# Patient Record
Sex: FEMALE | Race: WHITE | NOT HISPANIC OR LATINO | Employment: OTHER | URBAN - METROPOLITAN AREA
[De-identification: names, ages, dates, MRNs, and addresses within clinical notes are randomized per-mention and may not be internally consistent; named-entity substitution may affect disease eponyms.]

---

## 2017-05-25 ENCOUNTER — APPOINTMENT (OUTPATIENT)
Dept: WOMENS IMAGING | Facility: HOSPITAL | Age: 77
End: 2017-05-25

## 2017-05-25 PROCEDURE — G0202 SCR MAMMO BI INCL CAD: HCPCS | Performed by: RADIOLOGY

## 2017-05-25 PROCEDURE — 77063 BREAST TOMOSYNTHESIS BI: CPT | Performed by: RADIOLOGY

## 2017-05-25 PROCEDURE — MDREVIEWSP: Performed by: RADIOLOGY

## 2018-05-08 ENCOUNTER — OFFICE VISIT CONVERTED (OUTPATIENT)
Dept: ORTHOPEDIC SURGERY | Facility: CLINIC | Age: 78
End: 2018-05-08
Attending: PHYSICIAN ASSISTANT

## 2018-05-17 ENCOUNTER — OFFICE VISIT CONVERTED (OUTPATIENT)
Dept: ORTHOPEDIC SURGERY | Facility: CLINIC | Age: 78
End: 2018-05-17
Attending: ORTHOPAEDIC SURGERY

## 2018-07-10 ENCOUNTER — OFFICE VISIT CONVERTED (OUTPATIENT)
Dept: ORTHOPEDIC SURGERY | Facility: CLINIC | Age: 78
End: 2018-07-10
Attending: ORTHOPAEDIC SURGERY

## 2018-07-18 ENCOUNTER — APPOINTMENT (OUTPATIENT)
Dept: WOMENS IMAGING | Facility: HOSPITAL | Age: 78
End: 2018-07-18

## 2018-07-18 PROCEDURE — 77067 SCR MAMMO BI INCL CAD: CPT | Performed by: RADIOLOGY

## 2018-07-18 PROCEDURE — 77063 BREAST TOMOSYNTHESIS BI: CPT | Performed by: RADIOLOGY

## 2018-08-07 ENCOUNTER — OFFICE VISIT CONVERTED (OUTPATIENT)
Dept: ORTHOPEDIC SURGERY | Facility: CLINIC | Age: 78
End: 2018-08-07
Attending: ORTHOPAEDIC SURGERY

## 2018-11-15 ENCOUNTER — OFFICE VISIT CONVERTED (OUTPATIENT)
Dept: ORTHOPEDIC SURGERY | Facility: CLINIC | Age: 78
End: 2018-11-15
Attending: ORTHOPAEDIC SURGERY

## 2019-05-26 ENCOUNTER — HOSPITAL ENCOUNTER (OUTPATIENT)
Dept: URGENT CARE | Facility: CLINIC | Age: 79
Discharge: HOME | End: 2019-05-26

## 2019-05-30 ENCOUNTER — OFFICE VISIT CONVERTED (OUTPATIENT)
Dept: ORTHOPEDIC SURGERY | Facility: CLINIC | Age: 79
End: 2019-05-30
Attending: ORTHOPAEDIC SURGERY

## 2019-06-11 ENCOUNTER — HOSPITAL ENCOUNTER (OUTPATIENT)
Dept: GENERAL RADIOLOGY | Facility: HOSPITAL | Age: 79
Discharge: HOME | End: 2019-06-11
Attending: INTERNAL MEDICINE

## 2019-08-07 ENCOUNTER — APPOINTMENT (OUTPATIENT)
Dept: WOMENS IMAGING | Facility: HOSPITAL | Age: 79
End: 2019-08-07

## 2019-08-07 PROCEDURE — 77063 BREAST TOMOSYNTHESIS BI: CPT | Performed by: RADIOLOGY

## 2019-08-07 PROCEDURE — 77067 SCR MAMMO BI INCL CAD: CPT | Performed by: RADIOLOGY

## 2019-08-30 ENCOUNTER — OFFICE VISIT CONVERTED (OUTPATIENT)
Dept: ORTHOPEDIC SURGERY | Facility: CLINIC | Age: 79
End: 2019-08-30
Attending: ORTHOPAEDIC SURGERY

## 2019-09-16 ENCOUNTER — CONVERSION ENCOUNTER (OUTPATIENT)
Dept: SURGERY | Facility: CLINIC | Age: 79
End: 2019-09-16

## 2019-09-16 ENCOUNTER — OFFICE VISIT CONVERTED (OUTPATIENT)
Dept: SURGERY | Facility: CLINIC | Age: 79
End: 2019-09-16
Attending: SURGERY

## 2019-10-10 ENCOUNTER — HOSPITAL ENCOUNTER (OUTPATIENT)
Dept: PERIOP | Facility: HOSPITAL | Age: 79
Setting detail: HOSPITAL OUTPATIENT SURGERY
Discharge: HOME | End: 2019-10-10
Attending: SURGERY

## 2019-10-10 LAB
GLUCOSE BLD-MCNC: 112 MG/DL (ref 65–99)
GLUCOSE BLD-MCNC: 99 MG/DL (ref 65–99)

## 2020-05-23 ENCOUNTER — HOSPITAL ENCOUNTER (OUTPATIENT)
Dept: URGENT CARE | Facility: CLINIC | Age: 80
Discharge: HOME | End: 2020-05-23
Attending: PHYSICIAN ASSISTANT

## 2020-06-09 ENCOUNTER — OFFICE VISIT CONVERTED (OUTPATIENT)
Dept: ORTHOPEDIC SURGERY | Facility: CLINIC | Age: 80
End: 2020-06-09
Attending: ORTHOPAEDIC SURGERY

## 2020-06-18 ENCOUNTER — HOSPITAL ENCOUNTER (OUTPATIENT)
Dept: MRI IMAGING | Facility: HOSPITAL | Age: 80
Discharge: HOME | End: 2020-06-18

## 2020-06-25 ENCOUNTER — HOSPITAL ENCOUNTER (OUTPATIENT)
Dept: URGENT CARE | Facility: CLINIC | Age: 80
Discharge: HOME | End: 2020-06-25
Attending: FAMILY MEDICINE

## 2020-09-28 ENCOUNTER — OFFICE VISIT (OUTPATIENT)
Dept: GASTROENTEROLOGY | Facility: CLINIC | Age: 80
End: 2020-09-28

## 2020-09-28 VITALS
DIASTOLIC BLOOD PRESSURE: 82 MMHG | WEIGHT: 228 LBS | TEMPERATURE: 97.7 F | BODY MASS INDEX: 37.99 KG/M2 | OXYGEN SATURATION: 96 % | HEIGHT: 65 IN | HEART RATE: 90 BPM | SYSTOLIC BLOOD PRESSURE: 136 MMHG

## 2020-09-28 DIAGNOSIS — K56.690 OTHER PARTIAL INTESTINAL OBSTRUCTION (HCC): ICD-10-CM

## 2020-09-28 DIAGNOSIS — K59.04 CHRONIC IDIOPATHIC CONSTIPATION: Primary | ICD-10-CM

## 2020-09-28 PROCEDURE — 99204 OFFICE O/P NEW MOD 45 MIN: CPT | Performed by: INTERNAL MEDICINE

## 2020-09-28 RX ORDER — ROPINIROLE 3 MG/1
TABLET, FILM COATED ORAL
COMMUNITY
Start: 2020-08-07 | End: 2021-11-26

## 2020-09-28 RX ORDER — CARVEDILOL 3.12 MG/1
3.12 TABLET ORAL 2 TIMES DAILY WITH MEALS
COMMUNITY
Start: 2020-09-02

## 2020-09-28 RX ORDER — PLECANATIDE 3 MG/1
3 TABLET ORAL DAILY
Qty: 30 TABLET | Refills: 5 | Status: SHIPPED | OUTPATIENT
Start: 2020-09-28 | End: 2020-10-08

## 2020-09-28 RX ORDER — OXYCODONE AND ACETAMINOPHEN 10; 325 MG/1; MG/1
TABLET ORAL
COMMUNITY
Start: 2020-09-08 | End: 2022-09-13

## 2020-09-28 RX ORDER — BUPROPION HYDROCHLORIDE 300 MG/1
300 TABLET ORAL EVERY MORNING
COMMUNITY
Start: 2020-09-02

## 2020-09-28 RX ORDER — AMITRIPTYLINE HYDROCHLORIDE 50 MG/1
50 TABLET, FILM COATED ORAL NIGHTLY
COMMUNITY
Start: 2020-09-02

## 2020-09-28 RX ORDER — BUMETANIDE 2 MG/1
2 TABLET ORAL DAILY PRN
Status: ON HOLD | COMMUNITY
End: 2022-10-14

## 2020-09-28 RX ORDER — ROSUVASTATIN CALCIUM 10 MG/1
10 TABLET, COATED ORAL NIGHTLY
COMMUNITY
End: 2022-09-13

## 2020-09-28 RX ORDER — CHLORAL HYDRATE 500 MG
1000 CAPSULE ORAL
COMMUNITY

## 2020-09-28 RX ORDER — FOLIC ACID 0.8 MG
1 TABLET ORAL DAILY
COMMUNITY

## 2020-09-28 NOTE — PATIENT INSTRUCTIONS
For constipation, begin taking Trulance 3mg once daily. Samples provided & prescription sent to pharmacy.    Schedule CT colonography for further evaluation.

## 2020-10-01 ENCOUNTER — TELEPHONE (OUTPATIENT)
Dept: GASTROENTEROLOGY | Facility: CLINIC | Age: 80
End: 2020-10-01

## 2020-10-01 NOTE — TELEPHONE ENCOUNTER
----- Message from KATHY Braden sent at 9/28/2020  2:21 PM EDT -----  Please obtain records from surgical procedure done 12/2019 with Dr. Mack near [place], [place]. Patient reports this was done at  [facility] in [facility]. Need op report and pathology report.  Requested procedure note and path   [phone]  [phone]

## 2020-10-01 NOTE — TELEPHONE ENCOUNTER
----- Message from KATHY Braden sent at 9/28/2020  2:21 PM EDT -----  Please obtain records from surgical procedure done 12/2019 with Dr. Mack near [place], [place]. Patient reports this was done at  [facility] in [facility]. Need op report and pathology report.    Requested records. pk

## 2020-10-02 ENCOUNTER — TELEPHONE (OUTPATIENT)
Dept: GASTROENTEROLOGY | Facility: CLINIC | Age: 80
End: 2020-10-02

## 2020-10-02 ENCOUNTER — APPOINTMENT (OUTPATIENT)
Dept: WOMENS IMAGING | Facility: HOSPITAL | Age: 80
End: 2020-10-02

## 2020-10-02 PROCEDURE — 77067 SCR MAMMO BI INCL CAD: CPT | Performed by: RADIOLOGY

## 2020-10-02 PROCEDURE — 77080 DXA BONE DENSITY AXIAL: CPT | Performed by: RADIOLOGY

## 2020-10-02 PROCEDURE — 77063 BREAST TOMOSYNTHESIS BI: CPT | Performed by: RADIOLOGY

## 2020-10-02 NOTE — TELEPHONE ENCOUNTER
----- Message from KATHY Braden sent at 9/28/2020  2:21 PM EDT -----  Please obtain records from surgical procedure done 12/2019 with Dr. Mack near [place], [place]. Patient reports this was done at  [facility] in [facility]. Need op report and pathology report.  Orin,  The op note and path reports are in EMR.  The path report went to your in box but not procedure note.  Thanks

## 2020-10-08 ENCOUNTER — TELEPHONE (OUTPATIENT)
Dept: GASTROENTEROLOGY | Facility: CLINIC | Age: 80
End: 2020-10-08

## 2020-10-08 RX ORDER — LUBIPROSTONE 24 UG/1
24 CAPSULE ORAL 2 TIMES DAILY WITH MEALS
Qty: 30 CAPSULE | Refills: 5 | OUTPATIENT
Start: 2020-10-08 | End: 2021-11-26

## 2020-10-08 NOTE — TELEPHONE ENCOUNTER
Patient was seen on 9/28/2020 for constipation , patient was prescribed Trulance 3 mg . Patient stated she had a BM the day she received the med, has not had one since  is requesting a alternative med be send into pharmacy . Please advise    MsFaviola Adkins [phone]    joselo ( in chart )

## 2020-10-26 ENCOUNTER — HOSPITAL ENCOUNTER (OUTPATIENT)
Dept: LAB | Facility: HOSPITAL | Age: 80
Discharge: HOME | End: 2020-10-26
Attending: PHYSICIAN ASSISTANT

## 2020-10-26 LAB
25(OH)D3 SERPL-MCNC: 31.5 NG/ML (ref 30–100)
ALBUMIN SERPL-MCNC: 4.4 G/DL (ref 3.5–5)
ALBUMIN/GLOB SERPL: 1.7 {RATIO} (ref 1.4–2.6)
ALP SERPL-CCNC: 95 U/L (ref 43–160)
ALT SERPL-CCNC: 16 U/L (ref 10–40)
ANION GAP SERPL CALC-SCNC: 21 MMOL/L (ref 8–19)
AST SERPL-CCNC: 17 U/L (ref 15–50)
BASOPHILS # BLD AUTO: 0.07 10*3/UL (ref 0–0.2)
BASOPHILS NFR BLD AUTO: 1.3 % (ref 0–3)
BILIRUB SERPL-MCNC: 0.55 MG/DL (ref 0.2–1.3)
BUN SERPL-MCNC: 34 MG/DL (ref 5–25)
BUN/CREAT SERPL: 24 {RATIO} (ref 6–20)
CALCIUM SERPL-MCNC: 10.3 MG/DL (ref 8.7–10.4)
CHLORIDE SERPL-SCNC: 100 MMOL/L (ref 99–111)
CHOLEST SERPL-MCNC: 165 MG/DL (ref 107–200)
CHOLEST/HDLC SERPL: 3.8 {RATIO} (ref 3–6)
CONV ABS IMM GRAN: 0 10*3/UL (ref 0–0.2)
CONV CO2: 26 MMOL/L (ref 22–32)
CONV IMMATURE GRAN: 0 % (ref 0–1.8)
CONV TOTAL PROTEIN: 7 G/DL (ref 6.3–8.2)
CREAT UR-MCNC: 1.4 MG/DL (ref 0.5–0.9)
DEPRECATED RDW RBC AUTO: 44.4 FL (ref 36.4–46.3)
EOSINOPHIL # BLD AUTO: 0.33 10*3/UL (ref 0–0.7)
EOSINOPHIL # BLD AUTO: 6.4 % (ref 0–7)
ERYTHROCYTE [DISTWIDTH] IN BLOOD BY AUTOMATED COUNT: 12.7 % (ref 11.7–14.4)
GFR SERPLBLD BASED ON 1.73 SQ M-ARVRAT: 36 ML/MIN/{1.73_M2}
GLOBULIN UR ELPH-MCNC: 2.6 G/DL (ref 2–3.5)
GLUCOSE SERPL-MCNC: 102 MG/DL (ref 65–99)
HCT VFR BLD AUTO: 39.9 % (ref 37–47)
HDLC SERPL-MCNC: 44 MG/DL (ref 40–60)
HGB BLD-MCNC: 12.4 G/DL (ref 12–16)
LDLC SERPL CALC-MCNC: 70 MG/DL (ref 70–100)
LYMPHOCYTES # BLD AUTO: 1.58 10*3/UL (ref 1–5)
LYMPHOCYTES NFR BLD AUTO: 30.4 % (ref 20–45)
MCH RBC QN AUTO: 29.7 PG (ref 27–31)
MCHC RBC AUTO-ENTMCNC: 31.1 G/DL (ref 33–37)
MCV RBC AUTO: 95.7 FL (ref 81–99)
MONOCYTES # BLD AUTO: 0.61 10*3/UL (ref 0.2–1.2)
MONOCYTES NFR BLD AUTO: 11.8 % (ref 3–10)
NEUTROPHILS # BLD AUTO: 2.6 10*3/UL (ref 2–8)
NEUTROPHILS NFR BLD AUTO: 50.1 % (ref 30–85)
NRBC CBCN: 0 % (ref 0–0.7)
OSMOLALITY SERPL CALC.SUM OF ELEC: 302 MOSM/KG (ref 273–304)
PLATELET # BLD AUTO: 283 10*3/UL (ref 130–400)
PMV BLD AUTO: 10.9 FL (ref 9.4–12.3)
POTASSIUM SERPL-SCNC: 5 MMOL/L (ref 3.5–5.3)
RBC # BLD AUTO: 4.17 10*6/UL (ref 4.2–5.4)
SODIUM SERPL-SCNC: 142 MMOL/L (ref 135–147)
T4 FREE SERPL-MCNC: 1 NG/DL (ref 0.9–1.8)
TRIGL SERPL-MCNC: 255 MG/DL (ref 40–150)
TSH SERPL-ACNC: 5.02 M[IU]/L (ref 0.27–4.2)
VLDLC SERPL-MCNC: 51 MG/DL (ref 5–37)
WBC # BLD AUTO: 5.19 10*3/UL (ref 4.8–10.8)

## 2020-11-10 DIAGNOSIS — K56.690 OTHER PARTIAL INTESTINAL OBSTRUCTION (HCC): ICD-10-CM

## 2020-11-10 DIAGNOSIS — K59.04 CHRONIC IDIOPATHIC CONSTIPATION: ICD-10-CM

## 2020-12-04 ENCOUNTER — OFFICE VISIT CONVERTED (OUTPATIENT)
Dept: ORTHOPEDIC SURGERY | Facility: CLINIC | Age: 80
End: 2020-12-04
Attending: ORTHOPAEDIC SURGERY

## 2020-12-28 ENCOUNTER — HOSPITAL ENCOUNTER (OUTPATIENT)
Dept: LAB | Facility: HOSPITAL | Age: 80
Discharge: HOME | End: 2020-12-28
Attending: PHYSICIAN ASSISTANT

## 2020-12-28 LAB
25(OH)D3 SERPL-MCNC: 30.4 NG/ML (ref 30–100)
ALBUMIN SERPL-MCNC: 4.6 G/DL (ref 3.5–5)
ALBUMIN/GLOB SERPL: 1.7 {RATIO} (ref 1.4–2.6)
ALP SERPL-CCNC: 104 U/L (ref 43–160)
ALT SERPL-CCNC: 15 U/L (ref 10–40)
ANION GAP SERPL CALC-SCNC: 15 MMOL/L (ref 8–19)
AST SERPL-CCNC: 18 U/L (ref 15–50)
BASOPHILS # BLD AUTO: 0.07 10*3/UL (ref 0–0.2)
BASOPHILS NFR BLD AUTO: 1.1 % (ref 0–3)
BILIRUB SERPL-MCNC: 0.73 MG/DL (ref 0.2–1.3)
BUN SERPL-MCNC: 40 MG/DL (ref 5–25)
BUN/CREAT SERPL: 28 {RATIO} (ref 6–20)
CALCIUM SERPL-MCNC: 9.9 MG/DL (ref 8.7–10.4)
CHLORIDE SERPL-SCNC: 101 MMOL/L (ref 99–111)
CHOLEST SERPL-MCNC: 214 MG/DL (ref 107–200)
CHOLEST/HDLC SERPL: 3.3 {RATIO} (ref 3–6)
CONV ABS IMM GRAN: 0.01 10*3/UL (ref 0–0.2)
CONV CO2: 29 MMOL/L (ref 22–32)
CONV IMMATURE GRAN: 0.2 % (ref 0–1.8)
CONV TOTAL PROTEIN: 7.3 G/DL (ref 6.3–8.2)
CREAT UR-MCNC: 1.45 MG/DL (ref 0.5–0.9)
DEPRECATED RDW RBC AUTO: 43.8 FL (ref 36.4–46.3)
EOSINOPHIL # BLD AUTO: 0.2 10*3/UL (ref 0–0.7)
EOSINOPHIL # BLD AUTO: 3.2 % (ref 0–7)
ERYTHROCYTE [DISTWIDTH] IN BLOOD BY AUTOMATED COUNT: 12.9 % (ref 11.7–14.4)
GFR SERPLBLD BASED ON 1.73 SQ M-ARVRAT: 34 ML/MIN/{1.73_M2}
GLOBULIN UR ELPH-MCNC: 2.7 G/DL (ref 2–3.5)
GLUCOSE SERPL-MCNC: 106 MG/DL (ref 65–99)
HCT VFR BLD AUTO: 43.1 % (ref 37–47)
HDLC SERPL-MCNC: 64 MG/DL (ref 40–60)
HGB BLD-MCNC: 13.7 G/DL (ref 12–16)
LDLC SERPL CALC-MCNC: 120 MG/DL (ref 70–100)
LYMPHOCYTES # BLD AUTO: 1.75 10*3/UL (ref 1–5)
LYMPHOCYTES NFR BLD AUTO: 28.3 % (ref 20–45)
MCH RBC QN AUTO: 29.6 PG (ref 27–31)
MCHC RBC AUTO-ENTMCNC: 31.8 G/DL (ref 33–37)
MCV RBC AUTO: 93.1 FL (ref 81–99)
MONOCYTES # BLD AUTO: 0.6 10*3/UL (ref 0.2–1.2)
MONOCYTES NFR BLD AUTO: 9.7 % (ref 3–10)
NEUTROPHILS # BLD AUTO: 3.55 10*3/UL (ref 2–8)
NEUTROPHILS NFR BLD AUTO: 57.5 % (ref 30–85)
NRBC CBCN: 0 % (ref 0–0.7)
OSMOLALITY SERPL CALC.SUM OF ELEC: 300 MOSM/KG (ref 273–304)
PLATELET # BLD AUTO: 255 10*3/UL (ref 130–400)
PMV BLD AUTO: 10.7 FL (ref 9.4–12.3)
POTASSIUM SERPL-SCNC: 4.5 MMOL/L (ref 3.5–5.3)
RBC # BLD AUTO: 4.63 10*6/UL (ref 4.2–5.4)
SODIUM SERPL-SCNC: 140 MMOL/L (ref 135–147)
T4 FREE SERPL-MCNC: 1 NG/DL (ref 0.9–1.8)
TRIGL SERPL-MCNC: 152 MG/DL (ref 40–150)
TSH SERPL-ACNC: 1.62 M[IU]/L (ref 0.27–4.2)
VLDLC SERPL-MCNC: 30 MG/DL (ref 5–37)
WBC # BLD AUTO: 6.18 10*3/UL (ref 4.8–10.8)

## 2021-01-05 ENCOUNTER — HOSPITAL ENCOUNTER (OUTPATIENT)
Dept: GENERAL RADIOLOGY | Facility: HOSPITAL | Age: 81
Discharge: HOME | End: 2021-01-05
Attending: INTERNAL MEDICINE

## 2021-01-12 ENCOUNTER — OFFICE VISIT CONVERTED (OUTPATIENT)
Dept: ORTHOPEDIC SURGERY | Facility: CLINIC | Age: 81
End: 2021-01-12
Attending: ORTHOPAEDIC SURGERY

## 2021-01-21 ENCOUNTER — OFFICE VISIT CONVERTED (OUTPATIENT)
Dept: ORTHOPEDIC SURGERY | Facility: CLINIC | Age: 81
End: 2021-01-21
Attending: ORTHOPAEDIC SURGERY

## 2021-01-25 ENCOUNTER — HOSPITAL ENCOUNTER (OUTPATIENT)
Dept: LAB | Facility: HOSPITAL | Age: 81
Discharge: HOME | End: 2021-01-25
Attending: INTERNAL MEDICINE

## 2021-01-25 LAB
ALBUMIN SERPL-MCNC: 4.2 G/DL (ref 3.5–5)
ALBUMIN/GLOB SERPL: 1.6 {RATIO} (ref 1.4–2.6)
ALP SERPL-CCNC: 100 U/L (ref 43–160)
ALT SERPL-CCNC: 13 U/L (ref 10–40)
ANION GAP SERPL CALC-SCNC: 15 MMOL/L (ref 8–19)
AST SERPL-CCNC: 16 U/L (ref 15–50)
BASOPHILS # BLD AUTO: 0.07 10*3/UL (ref 0–0.2)
BASOPHILS NFR BLD AUTO: 1.6 % (ref 0–3)
BILIRUB SERPL-MCNC: 0.53 MG/DL (ref 0.2–1.3)
BUN SERPL-MCNC: 23 MG/DL (ref 5–25)
BUN/CREAT SERPL: 19 {RATIO} (ref 6–20)
CALCIUM SERPL-MCNC: 9.5 MG/DL (ref 8.7–10.4)
CHLORIDE SERPL-SCNC: 103 MMOL/L (ref 99–111)
CHOLEST SERPL-MCNC: 165 MG/DL (ref 107–200)
CHOLEST/HDLC SERPL: 2.8 {RATIO} (ref 3–6)
CONV ABS IMM GRAN: 0.02 10*3/UL (ref 0–0.2)
CONV CO2: 28 MMOL/L (ref 22–32)
CONV CREATININE URINE, RANDOM: 65.1 MG/DL (ref 10–300)
CONV IMMATURE GRAN: 0.5 % (ref 0–1.8)
CONV MICROALBUM.,U,RANDOM: <12 MG/L (ref 0–20)
CONV TOTAL PROTEIN: 6.9 G/DL (ref 6.3–8.2)
CREAT UR-MCNC: 1.18 MG/DL (ref 0.5–0.9)
DEPRECATED RDW RBC AUTO: 43.9 FL (ref 36.4–46.3)
EOSINOPHIL # BLD AUTO: 0.22 10*3/UL (ref 0–0.7)
EOSINOPHIL # BLD AUTO: 5.2 % (ref 0–7)
ERYTHROCYTE [DISTWIDTH] IN BLOOD BY AUTOMATED COUNT: 12.9 % (ref 11.7–14.4)
EST. AVERAGE GLUCOSE BLD GHB EST-MCNC: 120 MG/DL
GFR SERPLBLD BASED ON 1.73 SQ M-ARVRAT: 44 ML/MIN/{1.73_M2}
GLOBULIN UR ELPH-MCNC: 2.7 G/DL (ref 2–3.5)
GLUCOSE SERPL-MCNC: 98 MG/DL (ref 65–99)
HBA1C MFR BLD: 5.8 % (ref 3.5–5.7)
HCT VFR BLD AUTO: 40.4 % (ref 37–47)
HDLC SERPL-MCNC: 58 MG/DL (ref 40–60)
HGB BLD-MCNC: 13 G/DL (ref 12–16)
LDLC SERPL CALC-MCNC: 76 MG/DL (ref 70–100)
LYMPHOCYTES # BLD AUTO: 1.46 10*3/UL (ref 1–5)
LYMPHOCYTES NFR BLD AUTO: 34.2 % (ref 20–45)
MCH RBC QN AUTO: 30.1 PG (ref 27–31)
MCHC RBC AUTO-ENTMCNC: 32.2 G/DL (ref 33–37)
MCV RBC AUTO: 93.5 FL (ref 81–99)
MICROALBUMIN/CREAT UR: 18.4 MG/G{CRE} (ref 0–35)
MONOCYTES # BLD AUTO: 0.51 10*3/UL (ref 0.2–1.2)
MONOCYTES NFR BLD AUTO: 11.9 % (ref 3–10)
NEUTROPHILS # BLD AUTO: 1.99 10*3/UL (ref 2–8)
NEUTROPHILS NFR BLD AUTO: 46.6 % (ref 30–85)
NRBC CBCN: 0 % (ref 0–0.7)
OSMOLALITY SERPL CALC.SUM OF ELEC: 296 MOSM/KG (ref 273–304)
PLATELET # BLD AUTO: 269 10*3/UL (ref 130–400)
PMV BLD AUTO: 10.6 FL (ref 9.4–12.3)
POTASSIUM SERPL-SCNC: 5 MMOL/L (ref 3.5–5.3)
RBC # BLD AUTO: 4.32 10*6/UL (ref 4.2–5.4)
SODIUM SERPL-SCNC: 141 MMOL/L (ref 135–147)
TRIGL SERPL-MCNC: 153 MG/DL (ref 40–150)
TSH SERPL-ACNC: 1.35 M[IU]/L (ref 0.27–4.2)
VLDLC SERPL-MCNC: 31 MG/DL (ref 5–37)
WBC # BLD AUTO: 4.27 10*3/UL (ref 4.8–10.8)

## 2021-03-11 ENCOUNTER — OFFICE VISIT CONVERTED (OUTPATIENT)
Dept: ORTHOPEDIC SURGERY | Facility: CLINIC | Age: 81
End: 2021-03-11
Attending: ORTHOPAEDIC SURGERY

## 2021-05-05 ENCOUNTER — HOSPITAL ENCOUNTER (OUTPATIENT)
Dept: GENERAL RADIOLOGY | Facility: HOSPITAL | Age: 81
Discharge: HOME | End: 2021-05-05

## 2021-05-10 NOTE — H&P
History and Physical      Patient Name: Chula Adkins   Patient ID: [id number]   Sex: Female   YOB: 1940    Primary Care Provider: Owen Mathias MD   Referring Provider: Ha Paige MD    Visit Date: June 9, 2020    Provider: Jose Richter MD   Location: [facility]   Location Address: [address] [place] [address]  [id number]   Location Phone: [phone]          Chief Complaint  · Left Shoulder Pain      History Of Present Illness  Chula Adkins is a 79 year old /White female who presents today to [facility].      She's here for evaluation of left shoulder pain that started 3-4 months ago. Patient states left shoulder pain radiates into left trapezius/scapular region. Denies any recent injury or trauma to left shoulder. She takes Oxycodone.       Past Medical History  Ankle swelling; Arthritis; Closed fracture of lumbar vertebra; Compression fracture; Congestive heart failure; Diabetes; Heart Disease; Hemorrhoid; High blood pressure; Hyperlipemia; Hypertension; Left foot pain; Limb Swelling; Lumbago/low back pain; Primary osteoarthritis of left knee; Primary osteoarthritis of right knee; Right knee pain; Shortness of Breath; Thumb pain         Past Surgical History  Appendectomy; Bladder Surg.; carpal tunnel; Cholecystectomy; Colon Surgery; Colonoscopy; EGD; Eye Implant; Gallbladder; Hysterectomy; Joint Surgery; Rotator Cuff repair         Medication List  amitriptyline 25 mg oral tablet; bumetanide 2 mg oral tablet; bupropion HCl 300 mg oral tablet extended release 24 hr; calcium citrate 250 mg calcium oral tablet; carvedilol 3.125 mg oral tablet; diclofenac sodium 75 mg oral tablet,delayed release (DR/EC); Fish Oil 360-1,200 mg oral capsule; glimepiride 1 mg oral tablet; OxyContin 10 mg oral tablet,oral only,ext.rel.12 hr; potassium chloride 20 mEq oral tablet extended release; ropinirole 3 mg oral tablet; rosuvastatin 10 mg oral tablet; simvastatin 40 mg  "oral tablet; Voltaren 1 % topical gel         Allergy List  NO KNOWN DRUG ALLERGIES         Family Medical History  Stroke; Heart Disease; Hypertension; Cancer, Unspecified; Diabetes, unspecified type; - No Family History of Colorectal Cancer; Heart Attack (MI); Family history of certain chronic disabling diseases; arthritis; Osteoporosis; Family history of Arthritis         Social History  Alcohol (Never); Alcohol Use (Never); Claustophobic (Unknown); lives with spouse; .; Recreational Drug Use (Never); Retired.; Tobacco (Never)         Review of Systems  · Constitutional  o Denies  o : fever, chills, weight loss  · Cardiovascular  o Denies  o : chest pain, shortness of breath  · Gastrointestinal  o Denies  o : liver disease, heartburn, nausea, blood in stools  · Genitourinary  o Denies  o : painful urination, blood in urine  · Integument  o Denies  o : rash, itching  · Neurologic  o Denies  o : headache, weakness, loss of consciousness  · Musculoskeletal  o Denies  o : painful, swollen joints  · Psychiatric  o Denies  o : drug/alcohol addiction, anxiety, depression      Vitals  Date Time BP Position Site L\R Cuff Size HR RR TEMP (F) WT  HT  BMI kg/m2 BSA m2 O2 Sat        06/09/2020 01:57 PM      78 - R   239lbs 0oz 5'  2\" 43.71 2.18 96 %          Physical Examination  · Constitutional  o Appearance  o : well developed, well-nourished, no obvious deformities present  · Head and Face  o Head  o :   § Inspection  § : normocephalic  o Face  o :   § Inspection  § : no facial lesions  · Eyes  o Conjunctivae  o : conjunctivae normal  o Sclerae  o : sclerae white  · Ears, Nose, Mouth and Throat  o Ears  o :   § External Ears  § : appearance within normal limits  § Hearing  § : intact  o Nose  o :   § External Nose  § : appearance normal  · Neck  o Inspection/Palpation  o : normal appearance  o Range of Motion  o : full range of motion  · Respiratory  o Respiratory Effort  o : breathing unlabored  o Inspection of " Chest  o : normal appearance  o Auscultation of Lungs  o : no audible wheezing or rales  · Cardiovascular  o Heart  o : regular rate  · Gastrointestinal  o Abdominal Examination  o : soft and non-tender  · Skin and Subcutaneous Tissue  o General Inspection  o : intact, no rashes  · Psychiatric  o General  o : Alert and oriented x3  o Judgement and Insight  o : judgment and insight intact  o Mood and Affect  o : mood normal, affect appropriate  · Left Shoulder  o Inspection  o : Near-full forward elevation. Abduction 95. Limited IR. Pain with movement. Tender trapezius/scapular region. Neurovascularly intact. Sensation grossly intact. Pulses normal.   · In Office Procedures  o View  o : AP/LATERAL  o Site  o : left, shoulder   o Indication  o : Left shoulder pain   o Study  o : X-rays ordered, taken in the office, and reviewed today.  o Xray  o : Negative for fracture or dislocation; Good joint space.   o Comparative Data  o : No comparative data found              Assessment  · Left shoulder pain, unspecified chronicity     719.41/M25.512  Left scapular pain      Plan  · Orders  o Scapula (Left) [facility] Preferred View ([id number]) - 719.41/M25.512 - 06/09/2020  · Medications  o Medications have been Reconciled  o Transition of Care or Provider Policy  · Instructions  o Reviewed the patient's Past Medical, Social, and Family history as well as the ROS at today's visit, no changes.  o Call or return if worsening symptoms.  o The above service was scribed by Amanda Tolentino on my behalf and I attest to the accuracy of the note. mc  o Discussed conservative treatment with patient. The plan is a course of physical therapy and prescription of Voltaren. Follow up PRN.             Electronically Signed by: Cathy Tolentino - , Other -Author on Carissa 10, 2020 08:04:25 AM  Electronically Co-signed by: Jose Richter MD -Reviewer on June 11, 2020 02:44:28 PM

## 2021-05-13 NOTE — PROGRESS NOTES
Progress Note      Patient Name: Chula Adkins   Patient ID: [id number]   Sex: Female   YOB: 1940    Primary Care Provider: Owen Mathias MD   Referring Provider: Ha Paige MD    Visit Date: December 4, 2020    Provider: Jose Richter MD   Location[facility]   Location Address: [address] [place] [address]  [id number]   Location Phone: [phone]          Chief Complaint  · Left Shoulder Pain      History Of Present Illness  Chula Adkins is a 80 year old /White female who presents today to [facility].      Patient presents today with a follow-up of left shoulder pain. She has been having left shoulder pain for several months. She has left shoulder pain radiates into left trapezius/scapular region. She denies any injury or trauma to her shoulder. She states therapy has helped her significantly. She is able to do majority of her ROM except IR with no pain.       Past Medical History  Ankle swelling; Arthritis; Closed fracture of lumbar vertebra; Compression fracture; Congestive heart failure; Diabetes; Heart Disease; Hemorrhoid; High blood pressure; Hyperlipemia; Hypertension; Left foot pain; Limb Swelling; Lumbago/low back pain; Primary osteoarthritis of left knee; Primary osteoarthritis of right knee; Right knee pain; Shortness of Breath; Thumb pain         Past Surgical History  Appendectomy; Bladder Surg.; carpal tunnel; Cholecystectomy; Colon Surgery; Colonoscopy; EGD; Eye Implant; Gallbladder; Hysterectomy; Joint Surgery; Rotator Cuff repair         Medication List  amitriptyline 25 mg oral tablet; bumetanide 2 mg oral tablet; bupropion HCl 300 mg oral tablet extended release 24 hr; calcium citrate 250 mg calcium oral tablet; carvedilol 3.125 mg oral tablet; diclofenac sodium 75 mg oral tablet,delayed release (DR/EC); Fish Oil 360-1,200 mg oral capsule; glimepiride 1 mg oral tablet; OxyContin 10 mg oral tablet,oral only,ext.rel.12 hr; potassium  "chloride 20 mEq oral tablet extended release; ropinirole 3 mg oral tablet; rosuvastatin 10 mg oral tablet; simvastatin 40 mg oral tablet; Voltaren 1 % topical gel         Allergy List  NO KNOWN DRUG ALLERGIES       Allergies Reconciled  Family Medical History  Stroke; Heart Disease; Hypertension; Cancer, Unspecified; Diabetes, unspecified type; - No Family History of Colorectal Cancer; Heart Attack (MI); Family history of certain chronic disabling diseases; arthritis; Osteoporosis; Family history of Arthritis         Social History  Alcohol (Never); Alcohol Use (Never); Claustophobic (Unknown); lives with spouse; .; Recreational Drug Use (Never); Retired.; Tobacco (Never)         Review of Systems  · Constitutional  o Denies  o : fever, chills, weight loss  · Cardiovascular  o Denies  o : chest pain, shortness of breath  · Gastrointestinal  o Denies  o : liver disease, heartburn, nausea, blood in stools  · Genitourinary  o Denies  o : painful urination, blood in urine  · Integument  o Denies  o : rash, itching  · Neurologic  o Denies  o : headache, weakness, loss of consciousness  · Musculoskeletal  o Denies  o : painful, swollen joints  · Psychiatric  o Denies  o : drug/alcohol addiction, anxiety, depression      Vitals  Date Time BP Position Site L\R Cuff Size HR RR TEMP (F) WT  HT  BMI kg/m2 BSA m2 O2 Sat FR L/min FiO2        12/04/2020 08:57 AM      79 - R   217lbs 0oz 5'  5\" 36.11 2.12 96 %            Physical Examination  · Constitutional  o Appearance  o : well developed, well-nourished, no obvious deformities present  · Head and Face  o Head  o :   § Inspection  § : normocephalic  o Face  o :   § Inspection  § : no facial lesions  · Eyes  o Conjunctivae  o : conjunctivae normal  o Sclerae  o : sclerae white  · Ears, Nose, Mouth and Throat  o Ears  o :   § External Ears  § : appearance within normal limits  § Hearing  § : intact  o Nose  o :   § External Nose  § : appearance " normal  · Neck  o Inspection/Palpation  o : normal appearance  o Range of Motion  o : full range of motion  · Respiratory  o Respiratory Effort  o : breathing unlabored  o Inspection of Chest  o : normal appearance  o Auscultation of Lungs  o : no audible wheezing or rales  · Cardiovascular  o Heart  o : regular rate  · Gastrointestinal  o Abdominal Examination  o : soft and non-tender  · Skin and Subcutaneous Tissue  o General Inspection  o : intact, no rashes  · Psychiatric  o General  o : Alert and oriented x3  o Judgement and Insight  o : judgment and insight intact  o Mood and Affect  o : mood normal, affect appropriate  · Left Shoulder  o Inspection  o : Sensation grossly intact. Neurovascular intact. Near full forward flexion. No skin discoloration or atrophy. Skin intact. IR to back pocket. Good tone of deltoid, biceps, triceps, wrist extensors, and wrist flexors. Radial pulse 2+, ulnar pulse 2+. Non-tender to palpation.   · Injection Note/Aspiration Note  o Site  o : left shoulder   o Procedure  o : Procedure: After educating the patient, patient gave consent for procedure. After using Chloraprep, the joint space was injected. The patient tolerated the procedure well.   o Medication  o : 80 mg of DepoMedrol with 9cc of 1% Lidocaine              Assessment  · Left shoulder pain, unspecified chronicity     719.41/M25.512  · Shoulder impingement syndrome, left     726.2/M75.42      Plan  · Orders  o Depo-Medrol injection 80mg () - - 12/04/2020   Lot [id number] Exp 10 2021 [employer] Administered by JENN Richter MD  o Shoulder Intra-articular Injection without US Guidance [facility] ([id number]) - - 12/04/2020   Lot [id number] Exp 08 01 2021 [employer] Administered by JENN Richter MD  · Medications  o Medications have been Reconciled  o Transition of Care or Provider Policy  · Instructions  o Dr. Richter saw and examined the patient and agrees with plan.   o Reviewed the patient's Past Medical, Social, and Family history as  well as the ROS at today's visit, no changes.  o Call or return if worsening symptoms.  o Follow Up PRN.  o This note was transcribed by Emily Lynch. ai taylor Discussed diagnosis and treatment options with the patient. Patient opted for an injection and tolerated it well.            Electronically Signed by: Emily Lynch-, Other -Author on December 7, 2020 09:27:39 AM  Electronically Co-signed by: Jose Richter MD -Reviewer on December 8, 2020 08:01:33 AM

## 2021-05-14 VITALS — HEIGHT: 65 IN | BODY MASS INDEX: 36.49 KG/M2 | OXYGEN SATURATION: 98 % | HEART RATE: 88 BPM | WEIGHT: 219 LBS

## 2021-05-14 VITALS — HEART RATE: 79 BPM | WEIGHT: 217 LBS | OXYGEN SATURATION: 96 % | BODY MASS INDEX: 36.15 KG/M2 | HEIGHT: 65 IN

## 2021-05-14 VITALS — OXYGEN SATURATION: 94 % | HEART RATE: 86 BPM | BODY MASS INDEX: 37.94 KG/M2 | WEIGHT: 222.25 LBS | HEIGHT: 64 IN

## 2021-05-14 VITALS — BODY MASS INDEX: 37.9 KG/M2 | WEIGHT: 222 LBS | HEIGHT: 64 IN | OXYGEN SATURATION: 98 % | HEART RATE: 78 BPM

## 2021-05-14 NOTE — PROGRESS NOTES
Progress Note      Patient Name: Chula Adkins   Patient ID: [id number]   Sex: Female   YOB: 1940    Primary Care Provider: Owen Mathias MD   Referring Provider: Ha Paige MD    Visit Date: January 12, 2021    Provider: Jose Richter MD   Location: [facility]   Location Address: [address] [place] [address]  [id number]   Location Phone: [phone]          Chief Complaint  · Left Shoulder Pain      History Of Present Illness  Chula Adkins is a 80 year old /White female who presents today to [facility].      Patient presents today with a follow-up of left shoulder pain. She has been having left shoulder pain for several months. She has left shoulder pain radiates into left trapezius/scapular region. She denies any injury or trauma to her shoulder. She states therapy has helped her significantly. She is able to do majority of her ROM except IR with no pain. Patient states that injection she received last visit didn't give her any relief of pain. She states physical therapy thinks she has a rotator cuff tear and should be reevaluated by Dr. Richter.       Past Medical History  Ankle swelling; Arthritis; Bladder Disorder; Closed fracture of lumbar vertebra; Compression fracture; Congestive heart failure; Diabetes; Heart Disease; Hemorrhoid; High blood pressure; Hyperlipemia; Hypertension; Left foot pain; Limb Swelling; Lumbago/low back pain; Primary osteoarthritis of left knee; Primary osteoarthritis of right knee; Right knee pain; Shortness of Breath; Thumb pain         Past Surgical History  Appendectomy; Bladder Surg.; carpal tunnel; Cholecystectomy; Colon Surgery; Colonoscopy; EGD; Eye Implant; Gallbladder; Hysterectomy; Joint Surgery; Rotator Cuff repair         Medication List  amitriptyline 25 mg oral tablet; bumetanide 2 mg oral tablet; bupropion HCl 300 mg oral tablet extended release 24 hr; calcium citrate 250 mg calcium oral tablet; carvedilol  "3.125 mg oral tablet; diclofenac sodium 75 mg oral tablet,delayed release (DR/EC); Fish Oil 360-1,200 mg oral capsule; glimepiride 1 mg oral tablet; OxyContin 10 mg oral tablet,oral only,ext.rel.12 hr; potassium chloride 20 mEq oral tablet extended release; ropinirole 3 mg oral tablet; rosuvastatin 10 mg oral tablet; simvastatin 40 mg oral tablet; Voltaren 1 % topical gel         Allergy List  NO KNOWN DRUG ALLERGIES       Allergies Reconciled  Family Medical History  Stroke; Heart Disease; Hypertension; Cancer, Unspecified; Diabetes, unspecified type; - No Family History of Colorectal Cancer; Heart Attack (MI); Family history of certain chronic disabling diseases; arthritis; Osteoporosis; Family history of Arthritis         Social History  Alcohol (Never); Alcohol Use (Never); Claustophobic (Unknown); lives with spouse; .; Recreational Drug Use (Never); Retired.; Tobacco (Never)         Review of Systems  · Constitutional  o Denies  o : fever, chills, weight loss  · Cardiovascular  o Denies  o : chest pain, shortness of breath  · Gastrointestinal  o Denies  o : liver disease, heartburn, nausea, blood in stools  · Genitourinary  o Denies  o : painful urination, blood in urine  · Integument  o Denies  o : rash, itching  · Neurologic  o Denies  o : headache, weakness, loss of consciousness  · Musculoskeletal  o Denies  o : painful, swollen joints  · Psychiatric  o Denies  o : drug/alcohol addiction, anxiety, depression      Vitals  Date Time BP Position Site L\R Cuff Size HR RR TEMP (F) WT  HT  BMI kg/m2 BSA m2 O2 Sat FR L/min FiO2        01/12/2021 03:57 PM      88 - R   219lbs 0oz 5'  5\" 36.44 2.13 98 %            Physical Examination  · Constitutional  o Appearance  o : well developed, well-nourished, no obvious deformities present  · Head and Face  o Head  o :   § Inspection  § : normocephalic  o Face  o :   § Inspection  § : no facial lesions  · Eyes  o Conjunctivae  o : conjunctivae " normal  o Sclerae  o : sclerae white  · Ears, Nose, Mouth and Throat  o Ears  o :   § External Ears  § : appearance within normal limits  § Hearing  § : intact  o Nose  o :   § External Nose  § : appearance normal  · Neck  o Inspection/Palpation  o : normal appearance  o Range of Motion  o : full range of motion  · Respiratory  o Respiratory Effort  o : breathing unlabored  o Inspection of Chest  o : normal appearance  o Auscultation of Lungs  o : no audible wheezing or rales  · Cardiovascular  o Heart  o : regular rate  · Gastrointestinal  o Abdominal Examination  o : soft and non-tender  · Skin and Subcutaneous Tissue  o General Inspection  o : intact, no rashes  · Psychiatric  o General  o : Alert and oriented x3  o Judgement and Insight  o : judgment and insight intact  o Mood and Affect  o : mood normal, affect appropriate  · Left Shoulder  o Inspection  o : Sensation grossly intact. Neurovascular intact. Skin intact. No swelling, skin discoloration or atrophy. IR to back pocket. Good tone of deltoid, biceps, triceps, wrist extensors, and wrist flexors. Near full flexion. Pain with near full forward flexion. Full cross body adduction. Abduction to 60 degrees. Good flexion and extension of the elbow. Tender biceps tendon.           Assessment  · Left shoulder pain, unspecified chronicity     719.41/M25.512      Plan  · Medications  o Medications have been Reconciled  o Transition of Care or Provider Policy  · Instructions  o Dr. Richter saw and examined the patient and agrees with plan.   o Reviewed the patient's Past Medical, Social, and Family history as well as the ROS at today's visit, no changes.  o Call or return if worsening symptoms.  o Follow up after MRI.  o This note was transcribed by Emily Lynch.   o Discussed diagnosis and treatment options with the patient. Discussed getting an MRI vs injection and continuation of therapy. Patient states she doesn't want surgical intervention and is interested  in another injection. Patient opted to get an MRI to see what it reveals about her shoulder.            Electronically Signed by: Emily Lynch-, Other -Author on January 14, 2021 01:51:34 PM  Electronically Co-signed by: Jose Richter MD -Reviewer on January 14, 2021 06:03:42 PM

## 2021-05-14 NOTE — PROGRESS NOTES
Progress Note      Patient Name: Chula Adkins   Patient ID: [id number]   Sex: Female   YOB: 1940    Primary Care Provider: Owen Mathias MD   Referring Provider: Ha Paige MD    Visit Date: January 21, 2021    Provider: Jose Richter MD   Location: [facility]   Location Address: [address] [place] [address]  [id number]   Location Phone: [phone]          Chief Complaint  · Left Shoulder Pain      History Of Present Illness  Chula Adkins is a 80 year old /White female who presents today to [facility].      The patient presents here today for follow up evaluation of her left shoulder. She has been having shoulder pain for several months. She has tried injections with no relief. Physical therapy thinks she possibly has a rotator cuff injury, so I previously ordered an MRI to evaluate her rotator cuff and she is here today for her shoulder MRI results.  She states physical therapy is helping some.       Past Medical History  Ankle swelling; Arthritis; Bladder Disorder; Closed fracture of lumbar vertebra; Compression fracture; Congestive heart failure; Diabetes; Heart Disease; Hemorrhoid; High blood pressure; Hyperlipemia; Hypertension; Left foot pain; Limb Swelling; Lumbago/low back pain; Primary osteoarthritis of left knee; Primary osteoarthritis of right knee; Right knee pain; Shortness of Breath; Thumb pain         Past Surgical History  Appendectomy; Bladder Surg.; carpal tunnel; Cholecystectomy; Colon Surgery; Colonoscopy; EGD; Eye Implant; Gallbladder; Hysterectomy; Joint Surgery; Rotator Cuff repair         Medication List  amitriptyline 25 mg oral tablet; bumetanide 2 mg oral tablet; bupropion HCl 300 mg oral tablet extended release 24 hr; calcium citrate 250 mg calcium oral tablet; carvedilol 3.125 mg oral tablet; diclofenac sodium 75 mg oral tablet,delayed release (DR/EC); Fish Oil 360-1,200 mg oral capsule; glimepiride 1 mg oral tablet;  "OxyContin 10 mg oral tablet,oral only,ext.rel.12 hr; potassium chloride 20 mEq oral tablet extended release; ropinirole 3 mg oral tablet; rosuvastatin 10 mg oral tablet; simvastatin 40 mg oral tablet; Voltaren 1 % topical gel         Allergy List  NO KNOWN DRUG ALLERGIES       Allergies Reconciled  Family Medical History  Stroke; Heart Disease; Hypertension; Cancer, Unspecified; Diabetes, unspecified type; - No Family History of Colorectal Cancer; Heart Attack (MI); Family history of certain chronic disabling diseases; arthritis; Osteoporosis; Family history of Arthritis         Social History  Alcohol (Never); Alcohol Use (Never); Claustophobic (Unknown); lives with spouse; .; Recreational Drug Use (Never); Retired.; Tobacco (Never)         Review of Systems  · Constitutional  o Denies  o : fever, chills, weight loss  · Cardiovascular  o Denies  o : chest pain, shortness of breath  · Gastrointestinal  o Denies  o : liver disease, heartburn, nausea, blood in stools  · Genitourinary  o Denies  o : painful urination, blood in urine  · Integument  o Denies  o : rash, itching  · Neurologic  o Denies  o : headache, weakness, loss of consciousness  · Musculoskeletal  o Denies  o : painful, swollen joints  · Psychiatric  o Denies  o : drug/alcohol addiction, anxiety, depression      Vitals  Date Time BP Position Site L\R Cuff Size HR RR TEMP (F) WT  HT  BMI kg/m2 BSA m2 O2 Sat FR L/min FiO2 HC       01/21/2021 09:21 AM      86 - R   222lbs 4oz 5'  4\" 38.15 2.13 94 %            Physical Examination  · Constitutional  o Appearance  o : well developed, well-nourished, no obvious deformities present  · Head and Face  o Head  o :   § Inspection  § : normocephalic  o Face  o :   § Inspection  § : no facial lesions  · Eyes  o Conjunctivae  o : conjunctivae normal  o Sclerae  o : sclerae white  · Ears, Nose, Mouth and Throat  o Ears  o :   § External Ears  § : appearance within normal limits  § Hearing  § : " intact  o Nose  o :   § External Nose  § : appearance normal  · Neck  o Inspection/Palpation  o : normal appearance  o Range of Motion  o : full range of motion  · Respiratory  o Respiratory Effort  o : breathing unlabored  o Inspection of Chest  o : normal appearance  o Auscultation of Lungs  o : no audible wheezing or rales  · Cardiovascular  o Heart  o : regular rate  · Gastrointestinal  o Abdominal Examination  o : soft and non-tender  · Skin and Subcutaneous Tissue  o General Inspection  o : intact, no rashes  · Psychiatric  o General  o : Alert and oriented x3  o Judgement and Insight  o : judgment and insight intact  o Mood and Affect  o : mood normal, affect appropriate  · Left Shoulder  o Inspection  o : The left shoulder appears normal compared to the right. No skin discoloration, atrophy, or swelling. FE 90. Abduction 80. Positive impingement signs. Sensation grossly intact. Neurovascularly intact.   · Imaging  o Imaging  o : [facility] 1/2021: Severe diffuse supraspinatus tendinosis with 8mm partial-thickness articular sided tear anterior distal footplate insertion. 2 cystic lesions within the rotator cuff as detailed above likely representing synovial cyst or ganglion cysts. Severe biceps tendinosis. AC joint arthropathy.          Assessment  · Left shoulder pain, unspecified chronicity     719.41/M25.512  · Rotator cuff tendinitis, left     726.10/M75.82      Plan  · Medications  o Medications have been Reconciled  o Transition of Care or Provider Policy  · Instructions  o Reviewed the patient's Past Medical, Social, and Family history as well as the ROS at today's visit, no changes.  o Call or return if worsening symptoms.  o Follow Up in 4 weeks.   o This note was transcribed by Alannah Pineda. .  o Discussed the treatment plan with the patient. The patient is requesting another steroid injection today, however she is getting her COVID-19 vaccine today and I recommended her waiting for  the steroid injection for a few weeks. Plan to continue physical therapy and anti-inflammatories. Follow up in 4 weeks.   o Electronically Identified Patient Education Materials Provided Electronically            Electronically Signed by: Alannah Pineda MA -Author on January 22, 2021 09:19:20 AM  Electronically Co-signed by: Jose Richter MD -Reviewer on January 22, 2021 06:46:05 PM

## 2021-05-14 NOTE — PROGRESS NOTES
Progress Note      Patient Name: Chula Adkins   Patient ID: [id number]   Sex: Female   YOB: 1940    Primary Care Provider: Owen Mathias MD   Referring Provider: Ha Paige MD    Visit Date: March 11, 2021    Provider: Jose Richter MD   Location: [facility]   Location Address: [address] [place] [address]  [id number]   Location Phone: [phone]          Chief Complaint  · Left Shoulder Pain      History Of Present Illness  Chula Adkins is a 80 year old /White female who presents today to [facility].      Patient presents today for a follow-up of left shoulder pain, left rotator cuff tendinitis. Patient has been treating her left shoulder tendinitis conservatively with anti-inflammatories and periodic injections. Patient has occasional left shoulder flare ups and is a very active individual.  She states recently she has been having increasing left shoulder pain and has noticed a decrease in range of motion.       Past Medical History  Ankle swelling; Arthritis; Bladder disorder; Closed fracture of lumbar vertebra; Compression fracture; Congestive heart failure; Diabetes; Heart Disease; Hemorrhoid; High blood pressure; Hyperlipemia; Hypertension; Left foot pain; Limb Swelling; Lumbago/low back pain; Primary osteoarthritis of left knee; Primary osteoarthritis of right knee; Right knee pain; Shortness of Breath; Thumb pain         Past Surgical History  Appendectomy; Bladder Surg.; carpal tunnel; Cholecystectomy; Colon Surgery; Colonoscopy; EGD; Eye Implant; Gallbladder; Hysterectomy; Joint Surgery; Rotator Cuff repair         Medication List  amitriptyline 25 mg oral tablet; bumetanide 2 mg oral tablet; bupropion HCl 300 mg oral tablet extended release 24 hr; calcium citrate 250 mg calcium oral tablet; carvedilol 3.125 mg oral tablet; diclofenac sodium 75 mg oral tablet,delayed release (DR/EC); Fish Oil 360-1,200 mg oral capsule; glimepiride 1 mg oral  "tablet; OxyContin 10 mg oral tablet,oral only,ext.rel.12 hr; potassium chloride 20 mEq oral tablet extended release; ropinirole 3 mg oral tablet; rosuvastatin 10 mg oral tablet; simvastatin 40 mg oral tablet; Voltaren 1 % topical gel         Allergy List  NO KNOWN DRUG ALLERGIES       Allergies Reconciled  Family Medical History  Stroke; Heart Disease; Hypertension; Cancer, Unspecified; Diabetes, unspecified type; - No Family History of Colorectal Cancer; Heart Attack (MI); Family history of certain chronic disabling diseases; arthritis; Osteoporosis; Family history of Arthritis         Social History  Alcohol (Never); Alcohol Use (Never); Claustophobic (Unknown); lives with spouse; .; Recreational Drug Use (Never); Retired.; Tobacco (Never)         Review of Systems  · Constitutional  o Denies  o : fever, chills, weight loss  · Cardiovascular  o Denies  o : chest pain, shortness of breath  · Gastrointestinal  o Denies  o : liver disease, heartburn, nausea, blood in stools  · Genitourinary  o Denies  o : painful urination, blood in urine  · Integument  o Denies  o : rash, itching  · Neurologic  o Denies  o : headache, weakness, loss of consciousness  · Musculoskeletal  o Denies  o : painful, swollen joints  · Psychiatric  o Denies  o : drug/alcohol addiction, anxiety, depression      Vitals  Date Time BP Position Site L\R Cuff Size HR RR TEMP (F) WT  HT  BMI kg/m2 BSA m2 O2 Sat FR L/min FiO2        03/11/2021 09:35 AM      78 - R   222lbs 0oz 5'  4\" 38.11 2.13 98 %            Physical Examination  · Constitutional  o Appearance  o : well developed, well-nourished, no obvious deformities present  · Head and Face  o Head  o :   § Inspection  § : normocephalic  o Face  o :   § Inspection  § : no facial lesions  · Eyes  o Conjunctivae  o : conjunctivae normal  o Sclerae  o : sclerae white  · Ears, Nose, Mouth and Throat  o Ears  o :   § External Ears  § : appearance within normal limits  § Hearing  § : " intact  o Nose  o :   § External Nose  § : appearance normal  · Neck  o Inspection/Palpation  o : normal appearance  o Range of Motion  o : full range of motion  · Respiratory  o Respiratory Effort  o : breathing unlabored  o Inspection of Chest  o : normal appearance  o Auscultation of Lungs  o : no audible wheezing or rales  · Cardiovascular  o Heart  o : regular rate  · Gastrointestinal  o Abdominal Examination  o : soft and non-tender  · Skin and Subcutaneous Tissue  o General Inspection  o : intact, no rashes  · Psychiatric  o General  o : Alert and oriented x3  o Judgement and Insight  o : judgment and insight intact  o Mood and Affect  o : mood normal, affect appropriate  · Left Shoulder  o Inspection  o : Decreased range of motion. Positive impingement testing. Forward flexion to 140. No swelling, skin discoloration or atrophy. Skin intact. Pulses are pleasant. Sensation grossly intact. Neurovascular intact. Good tone of deltoid, biceps, triceps, wrist extensors, and wrist flexors. Pain with abduction. Pain with cross body adduction.   · Injection Note/Aspiration Note  o Site  o : left shoulder   o Procedure  o : Procedure: After educating the patient, patient gave consent for procedure. After using Chloraprep, the joint space was injected. The patient tolerated the procedure well.   o Medication  o : 80 mg of DepoMedrol with 9cc of 1% Lidocaine          Assessment  · Primary osteoarthritis of left shoulder     715.11/M19.012  · Left shoulder pain, unspecified chronicity     719.41/M25.512  · Left Rotator Cuff Tendinitis     726.90/M77.9      Plan  · Orders  o Depo-Medrol injection 80mg () - - 03/11/2021   Lot [id number] Exp 01 2022 [employer] Administered by JENN Richter MD  o Shoulder Intra-articular Injection without US Guidance [facility] ([id number]) - - 03/11/2021   Lot [id number] 03 01 2022 [employer] Administered by JENN Richter MD  · Medications  o Medications have been Reconciled  o Transition of Care or  Provider Policy  · Instructions  o Dr. Richter saw and examined the patient and agrees with plan.   o Reviewed the patient's Past Medical, Social, and Family history as well as the ROS at today's visit, no changes.  o Call or return if worsening symptoms.  o Follow Up PRN.  o This note was transcribed by Emily Lynch. ai  o Discussed treatment plans with the patient. Patient opted for a left shoulder injection and tolerated this well. She will follow-up with us if symptoms fail to improve or worsen.             Electronically Signed by: Emily Lynch-, Other -Author on March 11, 2021 03:50:29 PM  Electronically Co-signed by: Jose Richter MD -Reviewer on March 11, 2021 10:42:01 PM

## 2021-05-15 VITALS — BODY MASS INDEX: 40.82 KG/M2 | HEIGHT: 65 IN | RESPIRATION RATE: 16 BRPM | WEIGHT: 245 LBS

## 2021-05-15 VITALS — OXYGEN SATURATION: 96 % | BODY MASS INDEX: 43.98 KG/M2 | WEIGHT: 239 LBS | HEIGHT: 62 IN | HEART RATE: 78 BPM

## 2021-05-15 VITALS — WEIGHT: 245 LBS | OXYGEN SATURATION: 97 % | HEART RATE: 76 BPM | BODY MASS INDEX: 40.82 KG/M2 | HEIGHT: 65 IN

## 2021-05-15 VITALS — OXYGEN SATURATION: 93 % | WEIGHT: 247 LBS | BODY MASS INDEX: 41.15 KG/M2 | HEIGHT: 65 IN | HEART RATE: 100 BPM

## 2021-05-16 VITALS — HEIGHT: 65 IN | HEART RATE: 88 BPM | BODY MASS INDEX: 40.32 KG/M2 | WEIGHT: 242 LBS | OXYGEN SATURATION: 96 %

## 2021-05-16 VITALS — HEIGHT: 65 IN | HEART RATE: 97 BPM | BODY MASS INDEX: 40.15 KG/M2 | WEIGHT: 241 LBS | OXYGEN SATURATION: 96 %

## 2021-05-16 VITALS — HEIGHT: 65 IN | OXYGEN SATURATION: 97 % | HEART RATE: 90 BPM | BODY MASS INDEX: 40.07 KG/M2 | WEIGHT: 240.5 LBS

## 2021-05-16 VITALS — BODY MASS INDEX: 39.99 KG/M2 | WEIGHT: 240 LBS | HEIGHT: 65 IN | RESPIRATION RATE: 18 BRPM

## 2021-05-16 VITALS — HEIGHT: 65 IN | RESPIRATION RATE: 16 BRPM | WEIGHT: 240 LBS | BODY MASS INDEX: 39.99 KG/M2

## 2021-05-24 ENCOUNTER — HOSPITAL ENCOUNTER (OUTPATIENT)
Dept: LAB | Facility: HOSPITAL | Age: 81
Discharge: HOME | End: 2021-05-24
Attending: INTERNAL MEDICINE

## 2021-05-24 LAB
ALBUMIN SERPL-MCNC: 4.8 G/DL (ref 3.5–5)
ALBUMIN/GLOB SERPL: 1.7 {RATIO} (ref 1.4–2.6)
ALP SERPL-CCNC: 92 U/L (ref 43–160)
ALT SERPL-CCNC: 19 U/L (ref 10–40)
ANION GAP SERPL CALC-SCNC: 16 MMOL/L (ref 8–19)
AST SERPL-CCNC: 22 U/L (ref 15–50)
BASOPHILS # BLD AUTO: 0.09 10*3/UL (ref 0–0.2)
BASOPHILS NFR BLD AUTO: 1.6 % (ref 0–3)
BILIRUB SERPL-MCNC: 0.37 MG/DL (ref 0.2–1.3)
BUN SERPL-MCNC: 35 MG/DL (ref 5–25)
BUN/CREAT SERPL: 28 {RATIO} (ref 6–20)
CALCIUM SERPL-MCNC: 10.1 MG/DL (ref 8.7–10.4)
CHLORIDE SERPL-SCNC: 105 MMOL/L (ref 99–111)
CHOLEST SERPL-MCNC: 214 MG/DL (ref 107–200)
CHOLEST/HDLC SERPL: 3.3 {RATIO} (ref 3–6)
CONV ABS IMM GRAN: 0.01 10*3/UL (ref 0–0.2)
CONV CO2: 26 MMOL/L (ref 22–32)
CONV CREATININE URINE, RANDOM: 26 MG/DL (ref 10–300)
CONV IMMATURE GRAN: 0.2 % (ref 0–1.8)
CONV MICROALBUM.,U,RANDOM: <12 MG/L (ref 0–20)
CONV TOTAL PROTEIN: 7.7 G/DL (ref 6.3–8.2)
CREAT UR-MCNC: 1.24 MG/DL (ref 0.5–0.9)
DEPRECATED RDW RBC AUTO: 45.2 FL (ref 36.4–46.3)
EOSINOPHIL # BLD AUTO: 0.29 10*3/UL (ref 0–0.7)
EOSINOPHIL # BLD AUTO: 5 % (ref 0–7)
ERYTHROCYTE [DISTWIDTH] IN BLOOD BY AUTOMATED COUNT: 13.1 % (ref 11.7–14.4)
EST. AVERAGE GLUCOSE BLD GHB EST-MCNC: 120 MG/DL
GFR SERPLBLD BASED ON 1.73 SQ M-ARVRAT: 41 ML/MIN/{1.73_M2}
GLOBULIN UR ELPH-MCNC: 2.9 G/DL (ref 2–3.5)
GLUCOSE SERPL-MCNC: 106 MG/DL (ref 65–99)
HBA1C MFR BLD: 5.8 % (ref 3.5–5.7)
HCT VFR BLD AUTO: 42.7 % (ref 37–47)
HDLC SERPL-MCNC: 64 MG/DL (ref 40–60)
HGB BLD-MCNC: 13.7 G/DL (ref 12–16)
LDLC SERPL CALC-MCNC: 117 MG/DL (ref 70–100)
LYMPHOCYTES # BLD AUTO: 1.77 10*3/UL (ref 1–5)
LYMPHOCYTES NFR BLD AUTO: 30.5 % (ref 20–45)
MCH RBC QN AUTO: 30.1 PG (ref 27–31)
MCHC RBC AUTO-ENTMCNC: 32.1 G/DL (ref 33–37)
MCV RBC AUTO: 93.8 FL (ref 81–99)
MICROALBUMIN/CREAT UR: 46.2 MG/G{CRE} (ref 0–35)
MONOCYTES # BLD AUTO: 0.77 10*3/UL (ref 0.2–1.2)
MONOCYTES NFR BLD AUTO: 13.3 % (ref 3–10)
NEUTROPHILS # BLD AUTO: 2.87 10*3/UL (ref 2–8)
NEUTROPHILS NFR BLD AUTO: 49.4 % (ref 30–85)
NRBC CBCN: 0 % (ref 0–0.7)
OSMOLALITY SERPL CALC.SUM OF ELEC: 302 MOSM/KG (ref 273–304)
PLATELET # BLD AUTO: 277 10*3/UL (ref 130–400)
PMV BLD AUTO: 11.4 FL (ref 9.4–12.3)
POTASSIUM SERPL-SCNC: 5.1 MMOL/L (ref 3.5–5.3)
RBC # BLD AUTO: 4.55 10*6/UL (ref 4.2–5.4)
SODIUM SERPL-SCNC: 142 MMOL/L (ref 135–147)
TRIGL SERPL-MCNC: 163 MG/DL (ref 40–150)
TSH SERPL-ACNC: 1.31 M[IU]/L (ref 0.27–4.2)
VLDLC SERPL-MCNC: 33 MG/DL (ref 5–37)
WBC # BLD AUTO: 5.8 10*3/UL (ref 4.8–10.8)

## 2021-06-03 ENCOUNTER — HOSPITAL ENCOUNTER (OUTPATIENT)
Dept: GENERAL RADIOLOGY | Facility: HOSPITAL | Age: 81
Discharge: HOME | End: 2021-06-03
Attending: INTERNAL MEDICINE

## 2021-06-07 ENCOUNTER — TRANSCRIBE ORDERS (OUTPATIENT)
Dept: ADMINISTRATIVE | Facility: HOSPITAL | Age: 81
End: 2021-06-07

## 2021-06-07 DIAGNOSIS — M54.50 CHRONIC LOW BACK PAIN, UNSPECIFIED BACK PAIN LATERALITY, UNSPECIFIED WHETHER SCIATICA PRESENT: Primary | ICD-10-CM

## 2021-06-07 DIAGNOSIS — M47.9 SPONDYLOSIS: ICD-10-CM

## 2021-06-07 DIAGNOSIS — G89.29 CHRONIC LOW BACK PAIN, UNSPECIFIED BACK PAIN LATERALITY, UNSPECIFIED WHETHER SCIATICA PRESENT: Primary | ICD-10-CM

## 2021-06-14 ENCOUNTER — HOSPITAL ENCOUNTER (OUTPATIENT)
Dept: MRI IMAGING | Facility: HOSPITAL | Age: 81
Discharge: HOME | End: 2021-06-14
Admitting: NURSE PRACTITIONER

## 2021-06-14 DIAGNOSIS — M47.9 SPONDYLOSIS: ICD-10-CM

## 2021-06-14 DIAGNOSIS — G89.29 CHRONIC LOW BACK PAIN, UNSPECIFIED BACK PAIN LATERALITY, UNSPECIFIED WHETHER SCIATICA PRESENT: ICD-10-CM

## 2021-06-14 DIAGNOSIS — M54.50 CHRONIC LOW BACK PAIN, UNSPECIFIED BACK PAIN LATERALITY, UNSPECIFIED WHETHER SCIATICA PRESENT: ICD-10-CM

## 2021-06-14 PROCEDURE — 72148 MRI LUMBAR SPINE W/O DYE: CPT

## 2021-10-08 ENCOUNTER — OFFICE VISIT (OUTPATIENT)
Dept: ORTHOPEDIC SURGERY | Facility: CLINIC | Age: 81
End: 2021-10-08

## 2021-10-08 VITALS — WEIGHT: 208 LBS | BODY MASS INDEX: 35.51 KG/M2 | HEIGHT: 64 IN

## 2021-10-08 DIAGNOSIS — M19.012 PRIMARY OSTEOARTHRITIS OF LEFT SHOULDER: Primary | ICD-10-CM

## 2021-10-08 DIAGNOSIS — M77.8 LEFT SHOULDER TENDINITIS: ICD-10-CM

## 2021-10-08 PROCEDURE — 20610 DRAIN/INJ JOINT/BURSA W/O US: CPT | Performed by: ORTHOPAEDIC SURGERY

## 2021-10-08 RX ORDER — METHYLPREDNISOLONE ACETATE 80 MG/ML
80 INJECTION, SUSPENSION INTRA-ARTICULAR; INTRALESIONAL; INTRAMUSCULAR; SOFT TISSUE
Status: COMPLETED | OUTPATIENT
Start: 2021-10-08 | End: 2021-10-08

## 2021-10-08 RX ORDER — LIDOCAINE HYDROCHLORIDE 10 MG/ML
9 INJECTION, SOLUTION INFILTRATION; PERINEURAL
Status: COMPLETED | OUTPATIENT
Start: 2021-10-08 | End: 2021-10-08

## 2021-10-08 RX ADMIN — METHYLPREDNISOLONE ACETATE 80 MG: 80 INJECTION, SUSPENSION INTRA-ARTICULAR; INTRALESIONAL; INTRAMUSCULAR; SOFT TISSUE at 08:23

## 2021-10-08 RX ADMIN — LIDOCAINE HYDROCHLORIDE 9 ML: 10 INJECTION, SOLUTION INFILTRATION; PERINEURAL at 08:23

## 2021-10-08 NOTE — PROGRESS NOTES
"Chief Complaint  Follow-up of the Left Shoulder     Subjective      Chula Adkins presents to [facility] for a follow-up of left shoulder. Patient has left shoulder osteoarthritis and left shoulder tendinitis that she has been treating conservatively. Previous visit she received an injection that did provide her with relief. She states she has a knot formation on her shoulder with no known injury or trauma. She has pain with shoulder range of motion.     No Known Allergies     Social History     Socioeconomic History   • Marital status:      Spouse name: Not on file   • Number of children: Not on file   • Years of education: Not on file   • Highest education level: Not on file   Tobacco Use   • Smoking status: Never Smoker   • Smokeless tobacco: Never Used   Vaping Use   • Vaping Use: Never used   Substance and Sexual Activity   • Alcohol use: Not Currently   • Drug use: Never   • Sexual activity: Defer        Review of Systems     Objective   Vital Signs:   Ht 162.6 cm (64\")   Wt 94.3 kg (208 lb)   BMI 35.70 kg/m²       Physical Exam  Constitutional:       Appearance: Normal appearance. Patient is well-developed and normal weight.   HENT:      Head: Normocephalic.      Right Ear: Hearing and external ear normal.      Left Ear: Hearing and external ear normal.      Nose: Nose normal.   Eyes:      Conjunctiva/sclera: Conjunctivae normal.   Cardiovascular:      Rate and Rhythm: Normal rate.   Pulmonary:      Effort: Pulmonary effort is normal.      Breath sounds: No wheezing or rales.   Abdominal:      Palpations: Abdomen is soft.      Tenderness: There is no abdominal tenderness.   Musculoskeletal:      Cervical back: Normal range of motion.   Skin:     Findings: No rash.   Neurological:      Mental Status: Patient is alert and oriented to person, place, and time.   Psychiatric:         Mood and Affect: Mood and affect normal.         Judgment: Judgment normal.       Ortho " Exam      LEFT SHOULDER: Forward elevation to 140 degrees. Good tone of deltoid, biceps, triceps, wrist extensors, and wrist flexors.  Sensation grossly intact. Neurovascular intact.  No swelling, skin discoloration or atrophy. Skin intact. Radial pulse 2+, ulnar pulse 2+. Full elbow flexion and extension of the elbow. Palpable mass. Mildly tender to the mass. Full cross body adduction. Abduction to 70 degrees. IR to L5.       Large Joint Arthrocentesis: L subacromial bursa  Date/Time: 10/8/2021 8:23 AM  Consent given by: patient  Site marked: site marked  Timeout: Immediately prior to procedure a time out was called to verify the correct patient, procedure, equipment, support staff and site/side marked as required   Supporting Documentation  Indications: pain   Procedure Details  Location: shoulder - L subacromial bursa  Needle gauge: 21G.  Medications administered: 9 mL lidocaine 1 %; 80 mg methylPREDNISolone acetate 80 MG/ML  Patient tolerance: patient tolerated the procedure well with no immediate complications            Imaging Results (Most Recent)     None           Result Review :       No results found.          Assessment and Plan     DX: Left shoulder osteoarthritis  Left rotator cuff tedinitis     Patient prescribed Volatren gel to rub on her shoulder. Patient given a left shoulder injection and tolerated this well.     Call or return if worsening symptoms.    Follow Up     PRN.       Patient was given instructions and counseling regarding her condition or for health maintenance advice. Please see specific information pulled into the AVS if appropriate.     Scribed for Jose Richter MD by Emily Lynch.  10/08/21   08:12 EDT        I have personally performed the services described in this document as scribed by the above individual and it is both accurate and complete. Jose Richter MD 10/08/21

## 2021-10-12 ENCOUNTER — APPOINTMENT (OUTPATIENT)
Dept: WOMENS IMAGING | Facility: HOSPITAL | Age: 81
End: 2021-10-12

## 2021-10-12 PROCEDURE — 77063 BREAST TOMOSYNTHESIS BI: CPT | Performed by: RADIOLOGY

## 2021-10-12 PROCEDURE — 77067 SCR MAMMO BI INCL CAD: CPT | Performed by: RADIOLOGY

## 2022-04-14 ENCOUNTER — OFFICE VISIT (OUTPATIENT)
Dept: ORTHOPEDIC SURGERY | Facility: CLINIC | Age: 82
End: 2022-04-14

## 2022-04-14 VITALS — HEIGHT: 65 IN | WEIGHT: 210.2 LBS | BODY MASS INDEX: 35.02 KG/M2

## 2022-04-14 DIAGNOSIS — M19.012 PRIMARY OSTEOARTHRITIS OF LEFT SHOULDER: Primary | ICD-10-CM

## 2022-04-14 PROCEDURE — 20610 DRAIN/INJ JOINT/BURSA W/O US: CPT | Performed by: ORTHOPAEDIC SURGERY

## 2022-04-14 RX ORDER — LIDOCAINE HYDROCHLORIDE 10 MG/ML
9 INJECTION, SOLUTION INFILTRATION; PERINEURAL
Status: COMPLETED | OUTPATIENT
Start: 2022-04-14 | End: 2022-04-14

## 2022-04-14 RX ORDER — TRIAMCINOLONE ACETONIDE 40 MG/ML
40 INJECTION, SUSPENSION INTRA-ARTICULAR; INTRAMUSCULAR
Status: COMPLETED | OUTPATIENT
Start: 2022-04-14 | End: 2022-04-14

## 2022-04-14 RX ADMIN — TRIAMCINOLONE ACETONIDE 40 MG: 40 INJECTION, SUSPENSION INTRA-ARTICULAR; INTRAMUSCULAR at 10:12

## 2022-04-14 RX ADMIN — LIDOCAINE HYDROCHLORIDE 9 ML: 10 INJECTION, SOLUTION INFILTRATION; PERINEURAL at 10:12

## 2022-04-14 NOTE — PROGRESS NOTES
"Chief Complaint  Follow-up of the Left Shoulder     Subjective      Chula Adkins presents to [facility] for a follow-up of left shoulder. Patient has left shoulder osteoarthritis, this is being treated conservatively with periodic injections and Voltaren Gel. She denies any new injuries to the left shoulder. Injections in the past have given her some relief. Patient states bother her arms are causing her pain, she thinks this is stemming from her neck. Whenever she reaches for items, she will get sharp pain radiating in the arm. The pain is on and off. Her right shoulder is worse than the left.     No Known Allergies     Social History     Socioeconomic History   • Marital status:    Tobacco Use   • Smoking status: Never Smoker   • Smokeless tobacco: Never Used   Vaping Use   • Vaping Use: Never used   Substance and Sexual Activity   • Alcohol use: Not Currently   • Drug use: Never   • Sexual activity: Defer        Review of Systems     Objective   Vital Signs:   Ht 165.1 cm (65\")   Wt 95.3 kg (210 lb 3.2 oz)   BMI 34.98 kg/m²       Physical Exam  Constitutional:       Appearance: Normal appearance. Patient is well-developed and normal weight.   HENT:      Head: Normocephalic.      Right Ear: Hearing and external ear normal.      Left Ear: Hearing and external ear normal.      Nose: Nose normal.   Eyes:      Conjunctiva/sclera: Conjunctivae normal.   Cardiovascular:      Rate and Rhythm: Normal rate.   Pulmonary:      Effort: Pulmonary effort is normal.      Breath sounds: No wheezing or rales.   Abdominal:      Palpations: Abdomen is soft.      Tenderness: There is no abdominal tenderness.   Musculoskeletal:      Cervical back: Normal range of motion.   Skin:     Findings: No rash.   Neurological:      Mental Status: Patient is alert and oriented to person, place, and time.   Psychiatric:         Mood and Affect: Mood and affect normal.         Judgment: Judgment normal. "       Ortho Exam      LEFT SHOULDER: Good tone of deltoid, biceps, triceps, wrist extensors, and wrist flexors.  Sensation grossly intact. Neurovascular intact.  Radial pulse 2+, ulnar pulse 2+. Full forward elevation. IR to L5. No swelling, skin discoloration or atrophy. Tender subacromial bursa. Tender greater tuberosity. Abduction to 60 degrees with pain.       Large Joint Arthrocentesis  Date/Time: 4/14/2022 10:12 AM  Consent given by: patient  Site marked: site marked  Timeout: Immediately prior to procedure a time out was called to verify the correct patient, procedure, equipment, support staff and site/side marked as required   Supporting Documentation  Indications: pain   Procedure Details  Location: shoulder (LEFT) -   Needle gauge: 21G.  Medications administered: 9 mL lidocaine 1 %; 40 mg triamcinolone acetonide 40 MG/ML  Patient tolerance: patient tolerated the procedure well with no immediate complications            Imaging Results (Most Recent)     None           Result Review :         No results found.           Assessment and Plan     DX: Left shoulder osteoarthritis     Discussed shoulder replacement, she is not interested in surgical intervention. At home exercises provided. A left shoulder injection given, she tolerated this well.     Call or return if worsening symptoms.    Follow Up     PRN.       Patient was given instructions and counseling regarding her condition or for health maintenance advice. Please see specific information pulled into the AVS if appropriate.     Scribed for Jose Richter MD by Emily Lynch.  04/14/22   08:59 EDT    I have personally performed the services described in this document as scribed by the above individual and it is both accurate and complete. Jose Richter MD 04/14/22

## 2022-04-26 ENCOUNTER — TELEPHONE (OUTPATIENT)
Dept: ORTHOPEDIC SURGERY | Facility: CLINIC | Age: 82
End: 2022-04-26

## 2022-04-27 NOTE — TELEPHONE ENCOUNTER
Appt made  
Caller: FRANK COLBY    Relationship to patient: SELF    Best call back number: [phone]    Chief complaint: RT SHOULDER PAIN    Type of visit: INJECTION    Requested date: AS SOON AS POSSIBLE    I  
no

## 2022-04-28 ENCOUNTER — OFFICE VISIT (OUTPATIENT)
Dept: ORTHOPEDIC SURGERY | Facility: CLINIC | Age: 82
End: 2022-04-28

## 2022-04-28 VITALS — HEART RATE: 79 BPM | OXYGEN SATURATION: 98 % | BODY MASS INDEX: 34.99 KG/M2 | HEIGHT: 65 IN | WEIGHT: 210 LBS

## 2022-04-28 DIAGNOSIS — G89.29 CHRONIC RIGHT SHOULDER PAIN: Primary | ICD-10-CM

## 2022-04-28 DIAGNOSIS — M25.511 CHRONIC RIGHT SHOULDER PAIN: Primary | ICD-10-CM

## 2022-04-28 PROCEDURE — 20610 DRAIN/INJ JOINT/BURSA W/O US: CPT | Performed by: PHYSICIAN ASSISTANT

## 2022-04-28 RX ORDER — LIDOCAINE HYDROCHLORIDE 10 MG/ML
5 INJECTION, SOLUTION INFILTRATION; PERINEURAL
Status: COMPLETED | OUTPATIENT
Start: 2022-04-28 | End: 2022-04-28

## 2022-04-28 RX ORDER — TRIAMCINOLONE ACETONIDE 40 MG/ML
40 INJECTION, SUSPENSION INTRA-ARTICULAR; INTRAMUSCULAR
Status: COMPLETED | OUTPATIENT
Start: 2022-04-28 | End: 2022-04-28

## 2022-04-28 RX ADMIN — TRIAMCINOLONE ACETONIDE 40 MG: 40 INJECTION, SUSPENSION INTRA-ARTICULAR; INTRAMUSCULAR at 11:06

## 2022-04-28 RX ADMIN — LIDOCAINE HYDROCHLORIDE 5 ML: 10 INJECTION, SOLUTION INFILTRATION; PERINEURAL at 11:06

## 2022-04-28 NOTE — PATIENT INSTRUCTIONS
Patient elected to receive right shoulder steroid injection, risks and benefits were discussed and she tolerated this well.  We will follow-up as needed moving forward

## 2022-04-28 NOTE — PROGRESS NOTES
"Chief Complaint  Pain of the Right Shoulder    Subjective          Chula Adkins presents to [facility] for follow-up on right shoulder pain.  She has a lot of pain around the humeral head.  Last seen 4/14/2022 and had a steroid injection in the left shoulder which greatly helped.  She is requesting right shoulder steroid injection today.  She uses Voltaren gel for pain relief.  She understands she is a candidate for shoulder replacement but is not interested in surgical intervention at this time.    Objective   No Known Allergies    Vital Signs:   Pulse 79   Ht 165.1 cm (65\")   Wt 95.3 kg (210 lb)   SpO2 98%   BMI 34.95 kg/m²       Physical Exam  Constitutional:       Appearance: Normal appearance. Patient is well-developed and normal weight.   HENT:      Head: Normocephalic.      Right Ear: Hearing and external ear normal.      Left Ear: Hearing and external ear normal.      Nose: Nose normal.   Eyes:      Conjunctiva/sclera: Conjunctivae normal.   Cardiovascular:      Rate and Rhythm: Normal rate.   Pulmonary:      Effort: Pulmonary effort is normal.      Breath sounds: No wheezing or rales.   Abdominal:      Palpations: Abdomen is soft.      Tenderness: There is no abdominal tenderness.   Musculoskeletal:      Cervical back: Normal range of motion.   Skin:     Findings: No rash.   Neurological:      Mental Status: Patient is alert and oriented to person, place, and time.   Psychiatric:         Mood and Affect: Mood and affect normal.         Judgment: Judgment normal.     Ortho Exam  Right shoulder: Skin intact, mild swelling and tenderness about the humeral head, crepitus with range of motion, decreased range of motion in all planes, good range of motion elbow wrist and digits, sensation and pulses intact distally  Result Review :            Imaging Results (Most Recent)     None         Large Joint Arthrocentesis: R subacromial bursa  Date/Time: 4/28/2022 11:06 AM  Consent " given by: patient  Site marked: site marked  Timeout: Immediately prior to procedure a time out was called to verify the correct patient, procedure, equipment, support staff and site/side marked as required   Supporting Documentation  Indications: pain   Procedure Details  Location: shoulder - R subacromial bursa  Needle gauge: 21g.  Medications administered: 5 mL lidocaine 1 %; 40 mg triamcinolone acetonide 40 MG/ML  Patient tolerance: patient tolerated the procedure well with no immediate complications            Assessment and Plan    Problem List Items Addressed This Visit        Musculoskeletal and Injuries    Chronic right shoulder pain - Primary    Current Assessment & Plan     Patient elected to receive right shoulder steroid injection, risks and benefits were discussed and she tolerated this well.  We will follow-up as needed moving forward                 Follow Up   Return if symptoms worsen or fail to improve.  Patient Instructions   Patient elected to receive right shoulder steroid injection, risks and benefits were discussed and she tolerated this well.  We will follow-up as needed moving forward    Patient was given instructions and counseling regarding her condition or for health maintenance advice. Please see specific information pulled into the AVS if appropriate.

## 2022-07-26 ENCOUNTER — TELEPHONE (OUTPATIENT)
Dept: ORTHOPEDIC SURGERY | Facility: CLINIC | Age: 82
End: 2022-07-26

## 2022-07-26 NOTE — TELEPHONE ENCOUNTER
Caller: PATIENT    Relationship to patient: SELF     Best call back number: [phone]    Chief complaint: LEFT SHOULDER PAIN    Type of visit: INJECTION    Requested date: ASAP    If rescheduling, when is the original appointment: N/A     Additional notes: PATIENT WAS CALLING TO SCHEDULE AN INJECTION WITH DR. PETERS FOR HER LEFT SHOULDER ASAP. THANK YOU!

## 2022-08-02 ENCOUNTER — OFFICE VISIT (OUTPATIENT)
Dept: ORTHOPEDIC SURGERY | Facility: CLINIC | Age: 82
End: 2022-08-02

## 2022-08-02 ENCOUNTER — TELEPHONE (OUTPATIENT)
Dept: SURGERY | Facility: CLINIC | Age: 82
End: 2022-08-02

## 2022-08-02 VITALS — BODY MASS INDEX: 33.99 KG/M2 | WEIGHT: 204 LBS | HEART RATE: 96 BPM | HEIGHT: 65 IN | OXYGEN SATURATION: 96 %

## 2022-08-02 DIAGNOSIS — M25.511 CHRONIC RIGHT SHOULDER PAIN: ICD-10-CM

## 2022-08-02 DIAGNOSIS — G89.29 CHRONIC RIGHT SHOULDER PAIN: ICD-10-CM

## 2022-08-02 DIAGNOSIS — M19.012 LOCALIZED OSTEOARTHRITIS OF LEFT SHOULDER: Primary | ICD-10-CM

## 2022-08-02 PROCEDURE — 20610 DRAIN/INJ JOINT/BURSA W/O US: CPT | Performed by: ORTHOPAEDIC SURGERY

## 2022-08-02 RX ORDER — LIDOCAINE HYDROCHLORIDE 10 MG/ML
9 INJECTION, SOLUTION INFILTRATION; PERINEURAL
Status: COMPLETED | OUTPATIENT
Start: 2022-08-02 | End: 2022-08-02

## 2022-08-02 RX ORDER — TRIAMCINOLONE ACETONIDE 40 MG/ML
40 INJECTION, SUSPENSION INTRA-ARTICULAR; INTRAMUSCULAR
Status: COMPLETED | OUTPATIENT
Start: 2022-08-02 | End: 2022-08-02

## 2022-08-02 RX ADMIN — LIDOCAINE HYDROCHLORIDE 9 ML: 10 INJECTION, SOLUTION INFILTRATION; PERINEURAL at 16:15

## 2022-08-02 RX ADMIN — TRIAMCINOLONE ACETONIDE 40 MG: 40 INJECTION, SUSPENSION INTRA-ARTICULAR; INTRAMUSCULAR at 16:15

## 2022-08-02 NOTE — TELEPHONE ENCOUNTER
CALLED PT TO SCHEDULE NEXT AVAILABLE APPT W//NO ANSWER/VM FULL/CALLED OTHER NUMBER IN PT ACCT/LMOM

## 2022-08-02 NOTE — PROGRESS NOTES
"Chief Complaint  Follow-up of the Right Shoulder and Follow-up of the Left Shoulder     Subjective      Chula Adkins presents to [facility] for a follow-up of bilateral shoulders. Patient has a history of bilateral shoulder osteoarthritis. She reports left shoulder is worse than the right. She states pain is very severe. She had injections in the past that has given her relief.     No Known Allergies     Social History     Socioeconomic History   • Marital status:    Tobacco Use   • Smoking status: Never Smoker   • Smokeless tobacco: Never Used   Vaping Use   • Vaping Use: Never used   Substance and Sexual Activity   • Alcohol use: Not Currently   • Drug use: Never   • Sexual activity: Defer        Review of Systems     Objective   Vital Signs:   Pulse 96   Ht 165.1 cm (65\")   Wt 92.5 kg (204 lb)   SpO2 96%   BMI 33.95 kg/m²       Physical Exam  Constitutional:       Appearance: Normal appearance. Patient is well-developed and normal weight.   HENT:      Head: Normocephalic.      Right Ear: Hearing and external ear normal.      Left Ear: Hearing and external ear normal.      Nose: Nose normal.   Eyes:      Conjunctiva/sclera: Conjunctivae normal.   Cardiovascular:      Rate and Rhythm: Normal rate.   Pulmonary:      Effort: Pulmonary effort is normal.      Breath sounds: No wheezing or rales.   Abdominal:      Palpations: Abdomen is soft.      Tenderness: There is no abdominal tenderness.   Musculoskeletal:      Cervical back: Normal range of motion.   Skin:     Findings: No rash.   Neurological:      Mental Status: Patient is alert and oriented to person, place, and time.   Psychiatric:         Mood and Affect: Mood and affect normal.         Judgment: Judgment normal.       Ortho Exam      BILATERAL SHOULDERS: Good tone of deltoid, biceps, triceps, wrist extensors, and wrist flexors.  Sensation grossly intact. Neurovascular intact.  Radial pulse 2+, ulnar pulse 2+. No " swelling, skin discoloration or atrophy. Tender subacromial bursa. Tender greater tuberosity. crepitus with motion of left shoulder. Full elbow flexion and extension. Forward elevation to 120 degrees.pain with shoulder ROM.       Large Joint Arthrocentesis  Date/Time: 8/2/2022 4:15 PM  Consent given by: patient  Site marked: site marked  Timeout: Immediately prior to procedure a time out was called to verify the correct patient, procedure, equipment, support staff and site/side marked as required   Supporting Documentation  Indications: pain   Procedure Details  Location: shoulder (RIGHT) -   Needle gauge: 21 G.  Medications administered: 9 mL lidocaine 1 %; 40 mg triamcinolone acetonide 40 MG/ML  Patient tolerance: patient tolerated the procedure well with no immediate complications    Large Joint Arthrocentesis  Date/Time: 8/2/2022 4:15 PM  Consent given by: patient  Site marked: site marked  Timeout: Immediately prior to procedure a time out was called to verify the correct patient, procedure, equipment, support staff and site/side marked as required   Supporting Documentation  Indications: pain   Procedure Details  Location: shoulder (LEFT) -   Needle gauge: 21 G.  Medications administered: 9 mL lidocaine 1 %; 40 mg triamcinolone acetonide 40 MG/ML  Patient tolerance: patient tolerated the procedure well with no immediate complications            Imaging Results (Most Recent)     None           Result Review :         No results found.           Assessment and Plan     Diagnoses and all orders for this visit:    1. Localized osteoarthritis of left shoulder (Primary)    2. Chronic right shoulder pain        Bilateral shoulder injections given, she tolerated this well.     Call or return if worsening symptoms.    Follow Up     PRN.       Patient was given instructions and counseling regarding her condition or for health maintenance advice. Please see specific information pulled into the AVS if appropriate.      Scribed for Jose Richter MD by Emily Lynch.  08/02/22   16:02 EDT    I have personally performed the services described in this document as scribed by the above individual and it is both accurate and complete. Jose Richter MD 08/02/22

## 2022-08-02 NOTE — TELEPHONE ENCOUNTER
Pt was seen in the past for a prolapsed hemorrhoid that Dr. Paige was unable to repair. She was sent to [place] but eventually had surgery in [place]. She currently has another hemorrhoid that is protruding, it is much smaller but she does have bleeding with it at times. She wants to know if he is willing to see her again as a patient.

## 2022-08-03 NOTE — TELEPHONE ENCOUNTER
"PT CAME IN PERSON TO SCHEDULE \"STATED HER PHONE DROPPED IN TOILET\" SCHEDULED PT WITH JACK PER COMMUNICATION/MS  "

## 2022-08-03 NOTE — TELEPHONE ENCOUNTER
2ND CALL TO PT TO SCHEDULE NEXT AVAILABLE APPT W//NO ANSWER/VM FULL/CALLED OTHER NUMBER IN PT ACCT/LMOM

## 2022-08-08 RX ORDER — DICYCLOMINE HCL 20 MG
TABLET ORAL
COMMUNITY
Start: 2022-06-27 | End: 2022-09-13

## 2022-08-08 RX ORDER — ROPINIROLE 3 MG/1
3 TABLET, FILM COATED ORAL NIGHTLY
COMMUNITY
Start: 2022-05-15

## 2022-08-08 RX ORDER — OXYCODONE AND ACETAMINOPHEN 10; 325 MG/1; MG/1
1 TABLET ORAL EVERY 12 HOURS SCHEDULED
COMMUNITY
Start: 2022-07-18

## 2022-08-08 RX ORDER — LEVOTHYROXINE SODIUM 25 MCG
25 TABLET ORAL
COMMUNITY
Start: 2022-07-16

## 2022-08-08 RX ORDER — KETOCONAZOLE 20 MG/ML
SHAMPOO TOPICAL
COMMUNITY
Start: 2022-06-27 | End: 2022-09-13

## 2022-08-09 ENCOUNTER — OFFICE VISIT (OUTPATIENT)
Dept: SURGERY | Facility: CLINIC | Age: 82
End: 2022-08-09

## 2022-08-09 VITALS — WEIGHT: 202.82 LBS | BODY MASS INDEX: 33.79 KG/M2 | RESPIRATION RATE: 14 BRPM | HEIGHT: 65 IN

## 2022-08-09 DIAGNOSIS — K62.3 RECTAL PROLAPSE: Primary | ICD-10-CM

## 2022-08-09 PROCEDURE — 99024 POSTOP FOLLOW-UP VISIT: CPT | Performed by: SURGERY

## 2022-08-09 NOTE — PROGRESS NOTES
Chief Complaint  Hemorrhoids    Subjective          Chula Adkins presents to [facility]  History of Present Illness    Chula Adkins is a 81 y.o. female  who presents today for a postoperative visit.     Patient is here for a follow-up for rectal prolapse.  I had initially met her and did a rectal exam under anesthesia for what I initially thought was going to be a prolapsing hemorrhoid.  It turns out she had rectal prolapse and we had initially referred her to a colorectal surgeon in [place].  She ended up actually getting a procedure done for this in [place] but has since developed recurrent rectal prolapse.  It is uncomfortable.    Past History:  Medical History: has a past medical history of CHF (congestive heart failure) (HCC), Constipation, Fibromyalgia, Hemorrhoids, Hyperlipidemia, Hypertension, Pain in back, and Restless leg syndrome.   Surgical History: has a past surgical history that includes Shoulder surgery; Colonoscopy; Foot surgery; Bladder repair; Hysterectomy; Gallbladder surgery; and Elbow surgery.   Family History: family history includes Stroke in her sister.   Social History: reports that she has never smoked. She has never used smokeless tobacco. She reports previous alcohol use. She reports that she does not use drugs.  Allergies: Patient has no known allergies.       Current Outpatient Medications:   •  amitriptyline (ELAVIL) 50 MG tablet, , Disp: , Rfl:   •  bumetanide (BUMEX) 2 MG tablet, Take 2 mg by mouth Daily., Disp: , Rfl:   •  buPROPion XL (WELLBUTRIN XL) 300 MG 24 hr tablet, , Disp: , Rfl:   •  carvedilol (COREG) 3.125 MG tablet, , Disp: , Rfl:   •  Diclofenac Sodium (VOLTAREN) 1 % gel gel, Apply 4 g topically to the appropriate area as directed 4 (Four) Times a Day As Needed (AS NEEDED)., Disp: 100 g, Rfl: 1  •  dicyclomine (BENTYL) 20 MG tablet, , Disp: , Rfl:   •  DULoxetine (CYMBALTA) 30 MG capsule, Cymbalta 30 mg oral capsule,delayed  "release(DR/EC) take 1 capsule by oral route daily   Suspended, Disp: , Rfl:   •  ketoconazole (NIZORAL) 2 % shampoo, APPLY 10 ML TOPICALLY TO THE SCALP EVERY 14 DAYS, Disp: , Rfl:   •  Magnesium 500 MG capsule, Take  by mouth., Disp: , Rfl:   •  MULTIPLE VITAMINS-MINERALS PO, Take  by mouth., Disp: , Rfl:   •  Omega-3 Fatty Acids (fish oil) 1000 MG capsule capsule, Take  by mouth., Disp: , Rfl:   •  oxyCODONE-acetaminophen (PERCOCET)  MG per tablet, TK 1 T PO Q 12 H PRN, Disp: , Rfl:   •  oxyCODONE-acetaminophen (PERCOCET)  MG per tablet, Every 12 (Twelve) Hours., Disp: , Rfl:   •  Plecanatide (Trulance) 3 MG tablet, Daily., Disp: , Rfl:   •  rOPINIRole (REQUIP) 3 MG tablet, , Disp: , Rfl:   •  rOPINIRole XL (REQUIP XL) 4 MG 24 hr tablet, Take 4 mg by mouth Daily., Disp: , Rfl:   •  rosuvastatin (CRESTOR) 10 MG tablet, Take 10 mg by mouth Daily., Disp: , Rfl:   •  simvastatin (ZOCOR) 40 MG tablet, , Disp: , Rfl:   •  Synthroid 25 MCG tablet, , Disp: , Rfl:        Physical Exam  She appears well today her abdomen is soft.  Objective     Vital Signs:   Resp 14   Ht 165.1 cm (65\")   Wt 92 kg (202 lb 13.2 oz)   BMI 33.75 kg/m²              Assessment and Plan    Diagnoses and all orders for this visit:    1. Rectal prolapse (Primary)    Splane to her that I do not do rectal prolapse surgery.  I will have her see my partner Dr. Diaz and see what he thinks her options are.      "

## 2022-08-17 ENCOUNTER — OFFICE VISIT (OUTPATIENT)
Dept: SURGERY | Facility: CLINIC | Age: 82
End: 2022-08-17

## 2022-08-17 ENCOUNTER — PREP FOR SURGERY (OUTPATIENT)
Dept: OTHER | Facility: HOSPITAL | Age: 82
End: 2022-08-17

## 2022-08-17 VITALS — RESPIRATION RATE: 16 BRPM | HEIGHT: 64 IN | WEIGHT: 203 LBS | BODY MASS INDEX: 34.66 KG/M2

## 2022-08-17 DIAGNOSIS — K62.3 RECTAL PROLAPSE: Primary | ICD-10-CM

## 2022-08-17 PROCEDURE — 99204 OFFICE O/P NEW MOD 45 MIN: CPT | Performed by: SURGERY

## 2022-08-17 RX ORDER — CEFAZOLIN SODIUM 2 G/100ML
2 INJECTION, SOLUTION INTRAVENOUS ONCE
Status: CANCELLED | OUTPATIENT
Start: 2022-08-17 | End: 2022-08-17

## 2022-08-17 RX ORDER — FLUOCINONIDE TOPICAL SOLUTION USP, 0.05% 0.5 MG/ML
SOLUTION TOPICAL
COMMUNITY
Start: 2022-08-04 | End: 2022-09-13

## 2022-08-17 RX ORDER — SODIUM CHLORIDE, SODIUM LACTATE, POTASSIUM CHLORIDE, CALCIUM CHLORIDE 600; 310; 30; 20 MG/100ML; MG/100ML; MG/100ML; MG/100ML
50 INJECTION, SOLUTION INTRAVENOUS CONTINUOUS
Status: CANCELLED | OUTPATIENT
Start: 2022-08-17

## 2022-08-17 RX ORDER — MINOCYCLINE HYDROCHLORIDE 100 MG/1
100 CAPSULE ORAL 2 TIMES DAILY
COMMUNITY
Start: 2022-08-04 | End: 2022-09-13

## 2022-08-22 NOTE — PROGRESS NOTES
General Surgery/Colorectal Surgery Note    Patient Name:  Chula Adkins  YOB: 1940  [id number]    Referring Provider: No ref. provider found      Patient Care Team:  Owen Mathias MD as PCP - General (Internal Medicine)  Ha Paige MD as Consulting Physician (General Surgery)    Chief complaint rectal prolapse    Subjective .     History of present illness:    She has a 6-month history of rectal prolapse.  Previous perineal proctosigmoidectomy 2019 at outside facility.  Operative report reviewed.  She has daily episodes of fecal incontinence.  No blood thinner use.  No tobacco use.  No chest pain.  Previous total abdominal hysterectomy, bladder repair, colon resection, laparoscopic cholecystectomy.  No alleviating factors.  No imaging.      History:  Past Medical History:   Diagnosis Date   • CHF (congestive heart failure) (HCC)    • Constipation    • Fibromyalgia    • Hemorrhoids    • Hyperlipidemia    • Hypertension    • Pain in back    • Restless leg syndrome        Past Surgical History:   Procedure Laterality Date   • BLADDER REPAIR     • COLONOSCOPY     • ELBOW PROCEDURE     • FOOT SURGERY     • GALLBLADDER SURGERY     • HYSTERECTOMY     • SHOULDER SURGERY         Family History   Problem Relation Age of Onset   • Stroke Sister    • Hypertension Neg Hx    • Diabetes Neg Hx    • Cancer Neg Hx        Social History     Tobacco Use   • Smoking status: Never Smoker   • Smokeless tobacco: Never Used   Vaping Use   • Vaping Use: Never used   Substance Use Topics   • Alcohol use: Not Currently   • Drug use: Never       Review of Systems  All systems were reviewed and negative except for:   Review of Systems   Constitutional: Negative for chills, fever and unexpected weight loss.   HENT: Negative for congestion, nosebleeds and voice change.    Eyes: Negative for blurred vision, double vision and discharge.   Respiratory: Negative for apnea, chest tightness and shortness of breath.     Cardiovascular: Negative for chest pain and leg swelling.   Gastrointestinal:        See HPI   Endocrine: Negative for cold intolerance and heat intolerance.   Genitourinary: Negative for dysuria, hematuria and urgency.   Musculoskeletal: Negative for back pain, joint swelling and neck pain.   Skin: Negative for color change and dry skin.   Neurological: Negative for dizziness and confusion.   Hematological: Negative for adenopathy.   Psychiatric/Behavioral: Negative for agitation and behavioral problems.     MEDS:  Prior to Admission medications    Medication Sig Start Date End Date Taking? Authorizing Provider   amitriptyline (ELAVIL) 50 MG tablet  9/2/20  Yes Kevin Wiggins MD   bumetanide (BUMEX) 2 MG tablet Take 2 mg by mouth Daily.   Yes ProviderKevin MD   buPROPion XL (WELLBUTRIN XL) 300 MG 24 hr tablet  9/2/20  Yes ProviderKevin MD   carvedilol (COREG) 3.125 MG tablet  9/2/20  Yes ProviderKevin MD   Diclofenac Sodium (VOLTAREN) 1 % gel gel Apply 4 g topically to the appropriate area as directed 4 (Four) Times a Day As Needed (AS NEEDED). 10/8/21  Yes Jose Richter MD   dicyclomine (BENTYL) 20 MG tablet  6/27/22  Yes ProviderKevin MD   DULoxetine (CYMBALTA) 30 MG capsule Cymbalta 30 mg oral capsule,delayed release(DR/EC) take 1 capsule by oral route daily   Suspended   Yes Emergency, Nurse Epic, RN   fluocinonide (LIDEX) 0.05 % external solution APPLY TOPICALLY TO THE SCALP EVERY DAY AS NEEDED FOR ITCHING 8/4/22  Yes ProviderKevin MD   ketoconazole (NIZORAL) 2 % shampoo APPLY 10 ML TOPICALLY TO THE SCALP EVERY 14 DAYS 6/27/22  Yes Provider, MD Kevin   Magnesium 500 MG capsule Take  by mouth.   Yes Kevin Wiggins MD   MULTIPLE VITAMINS-MINERALS PO Take  by mouth.   Yes Kevin Wiggins MD   Omega-3 Fatty Acids (fish oil) 1000 MG capsule capsule Take  by mouth.   Yes Kevin Wiggins MD   oxyCODONE-acetaminophen (PERCOCET)  MG per  tablet TK 1 T PO Q 12 H PRN 9/8/20  Yes Kevin Wiggins MD   Plecanatide (Trulance) 3 MG tablet Daily.   Yes Emergency, Nurse LISA Valle   rOPINIRole XL (REQUIP XL) 4 MG 24 hr tablet Take 4 mg by mouth Daily. 10/12/21  Yes Emergency, Nurse LISA Valle   rosuvastatin (CRESTOR) 10 MG tablet Take 10 mg by mouth Daily.   Yes Kevin Wiggins MD   simvastatin (ZOCOR) 40 MG tablet  8/21/21  Yes Emergency, Nurse LISA Valle   Synthroid 25 MCG tablet  7/16/22  Yes Kevin Wiggins MD   minocycline (MINOCIN,DYNACIN) 100 MG capsule Take 100 mg by mouth 2 (Two) Times a Day. for 14 days 8/4/22   Kevin Wiggins MD   oxyCODONE-acetaminophen (PERCOCET)  MG per tablet Every 12 (Twelve) Hours. 7/18/22   Kevin Wiggins MD   rOPINIRole (REQUIP) 3 MG tablet  5/15/22   Kevin Wiggins MD        Allergies:  Patient has no known allergies.    Objective     Vital Signs        Physical Exam:     General Appearance:    Alert, cooperative, in no acute distress   Head:    Normocephalic, without obvious abnormality, atraumatic   Eyes:          Conjunctivae and sclerae normal, no icterus,     Ears:    Ears appear intact with no abnormalities noted   Throat:   No oral lesions, no thrush, oral mucosa moist   Neck:   No adenopathy, supple, trachea midline, no thyromegaly   Back:     No kyphosis present, no scoliosis present, no skin lesions,      erythema or scars, no tenderness to percussion or                   palpation,   range of motion normal   Lungs:     Clear to auscultation,respirations regular, even and                  unlabored    Heart:    Regular rhythm and normal rate, normal S1 and S2, no            murmur, no gallop, no rub, no click   Chest Wall:    No abnormalities observed   Abdomen:     Normal bowel sounds, no masses, no organomegaly, soft        non-tender, non-distended, no guarding, no rebound                tenderness   Rectal:    Approximately 2 inch full-thickness rectal prolapse noted  "with patient straining on the toilet   Extremities:   Moves all extremities well, no edema, no cyanosis, no             redness   Pulses:   Pulses palpable and equal bilaterally   Skin:   No bleeding, bruising or rash   Lymph nodes:   No palpable adenopathy   Neurologic:   A/o x 4 with no deficits       Results Review:   {Results Review:11953::\"I reviewed the patient's new clinical results.\"    LABS/IMAGING:  Results for orders placed or performed during the hospital encounter of 11/26/21   POCT Influenza A/B    Specimen: Swab   Result Value Ref Range    Rapid Influenza A Ag Negative Negative    Rapid Influenza B Ag Negative Negative    Internal Control Passed Passed    Lot Number [id number]     Expiration Date 50,122    POCT SARS-CoV-2 Antigen EDWAR   ([facility])    Specimen: Swab   Result Value Ref Range    SARS Antigen Not Detected Not Detected    Internal Control Passed Passed    Lot Number [id number]     Expiration Date 31,823         Result Review :     Assessment & Plan     Recurrent rectal prolapse with history of perineal proctosigmoidectomy     I explained to the patient what rectal prolapse was.  She was instructed to reduce the prolapse as soon as it occurs.  Go to the emergency department for an unable to reduce.  I recommended robotic possible open rectopexy.  I explained the procedure and recovery.  Benefits and alternatives discussed.  Risk procedure including risk of anesthesia, bleeding, infection, conversion open, recurrence, damage to surrounding structures including ureters, heart attack, stroke, blood clot, pneumonia were discussed.  All questions answered.  She agrees with the plan.  Orders placed.      This document has been electronically signed by Navjot Diaz MD  August 22, 2022 07:58 EDT  "

## 2022-08-29 ENCOUNTER — HOSPITAL ENCOUNTER (OUTPATIENT)
Dept: GENERAL RADIOLOGY | Facility: HOSPITAL | Age: 82
Discharge: HOME | End: 2022-08-29
Admitting: NURSE PRACTITIONER

## 2022-08-29 ENCOUNTER — TRANSCRIBE ORDERS (OUTPATIENT)
Dept: GENERAL RADIOLOGY | Facility: HOSPITAL | Age: 82
End: 2022-08-29

## 2022-08-29 DIAGNOSIS — R06.00 DYSPNEA, UNSPECIFIED TYPE: Primary | ICD-10-CM

## 2022-08-29 DIAGNOSIS — R06.00 DYSPNEA, UNSPECIFIED TYPE: ICD-10-CM

## 2022-08-29 PROCEDURE — 71046 X-RAY EXAM CHEST 2 VIEWS: CPT

## 2022-09-13 ENCOUNTER — TELEPHONE (OUTPATIENT)
Dept: SURGERY | Facility: CLINIC | Age: 82
End: 2022-09-13

## 2022-09-13 ENCOUNTER — PRE-ADMISSION TESTING (OUTPATIENT)
Dept: PREADMISSION TESTING | Facility: HOSPITAL | Age: 82
End: 2022-09-13

## 2022-09-13 VITALS
SYSTOLIC BLOOD PRESSURE: 120 MMHG | BODY MASS INDEX: 35.23 KG/M2 | HEART RATE: 97 BPM | TEMPERATURE: 98.6 F | OXYGEN SATURATION: 96 % | WEIGHT: 206.35 LBS | RESPIRATION RATE: 20 BRPM | DIASTOLIC BLOOD PRESSURE: 72 MMHG | HEIGHT: 64 IN

## 2022-09-13 DIAGNOSIS — K62.3 RECTAL PROLAPSE: Primary | ICD-10-CM

## 2022-09-13 LAB — QT INTERVAL: 370 MS

## 2022-09-13 PROCEDURE — 93010 ELECTROCARDIOGRAM REPORT: CPT | Performed by: INTERNAL MEDICINE

## 2022-09-13 PROCEDURE — 93005 ELECTROCARDIOGRAM TRACING: CPT

## 2022-09-13 NOTE — DISCHARGE INSTRUCTIONS
IMPORTANT INSTRUCTIONS - PRE-ADMISSION TESTING  DO NOT EAT OR CHEW anything after midnight the night before your procedure.    You may have CLEAR liquids up to __2____ hours prior to ARRIVAL time.   Take the following medications the morning of your procedure with JUST A SIP OF WATER:  __ALBUTEROL INHALER AS NEEDED  TAKE: SYNTHROID, BUPROPRION, AMITRIPTYLINE CARVEDILOL_____________________________________________________________________________________________________________________________________________________________________________________    DO NOT BRING your medications to the hospital with you, UNLESS something has changed since your PRE-Admission Testing appointment.  Hold all vitamins, supplements, and NSAIDS (Non- steroidal anti-inflammatory meds) for one week prior to surgery (you MAY take Tylenol or Acetaminophen). LAST DOSE 9/15/22  If you are diabetic, check your blood sugar the morning of your procedure. If it is less than 70 or if you are feeling symptomatic, call the following number for further instructions: [phone] [facility] WILL CALL 9/22/22 BY 4 P.M._______.  Use your inhalers/nebulizers as usual, the morning of your procedure. BRING YOUR INHALERS with you.   Bring your CPAP or BIPAP to hospital, ONLY IF YOU WILL BE SPENDING THE NIGHT. NA  Make sure you have a ride home and have someone who will stay with you the day of your procedure after you go home.  If you have any questions, please call your Pre-Admission Testing Nurse, _______________REBECCA_ at [phone]__________.   Per anesthesia request, do not smoke for 24 hours before your procedure or as instructed by your surgeon.  NA  SHOWER AS DIRECTED WITH SURGICAL SOAPPREOPERATIVE (BEFORE SURGERY)              BATHING INSTRUCTIONS  Instructions:    You will need to shower 1 time utilizing the soap provided; at the times indicated   below:     - AM THE DAY OF SURGERY OR PM THE NIGHT BEFORE     Wash your hair and face  with normal shampoo and soap, rinse it well before using the surgical soap.      In the shower, wet the skin completely with water from your neck to your feet. Apply the cleanser to your   body ONLY FROM THE NECK TO YOUR FEET.     Do NOT USE THE CLEANSER ON YOUR FACE, HEAD, OR GENITAL (PRIVATE) AREAS.   Keep it out of your eyes, ears, and mouth because of the risk of injury to those areas.      Scrub with a clean washcloth for each bath utilizing the soap provided from the top of your body to the   bottom starting at the neck area.      Pay close attention to your armpits, groin area, and the site of surgery.      Wash your body gently for 5 minutes. Stand outside the stream or turn off the water while scrubbing your   body. Do NOT wash with your regular soap after the surgical cleanser is used.      RINSE THE CLEANSER OFF COMPLETELY with plenty of water. Rinse the area again thoroughly.      Dry off with a clean towel. The surgical soap can cause dryness; however do NOT APPLY LOTION,   CREAM, POWDER, and/or DEODORANT AFTER SHOWERING.     Be sure to where clean clothes after showering.      Ensure CLEAN BED LINENS AFTER FIRST wash with the surgical soap.      NO PETS ALLOWED IN THE BED with you after utilizing the surgical soap.

## 2022-09-13 NOTE — NURSING NOTE
NO BOWEL PREP ORDERED PER DR. MCFARLANE PER PT. LM WITH MARTÍN SCHMID TO CONFIRM. AWAITING RESPONSE  9/13/22  NO BOWEL PREP INDICATED PER MARTÍN MAGANA/DR. MCFARLANE

## 2022-09-13 NOTE — TELEPHONE ENCOUNTER
Ling called from PAT.  Patient scheduled 09/23/22 for Divinci rectopexy.  She wants to verify that patient does not have pre-op bowel prep or laxative order.  The patient told her that she was not given either of these.

## 2022-09-13 NOTE — SIGNIFICANT NOTE
PT GOES TO [facility], [facility], FOR TREATMENT OF CHRONIC BACK PAIN. RX NARC.  [facility] AND SANGE[facility] IN [facility].

## 2022-09-16 ENCOUNTER — ANESTHESIA EVENT (OUTPATIENT)
Dept: PERIOP | Facility: HOSPITAL | Age: 82
End: 2022-09-16

## 2022-09-19 ENCOUNTER — TELEPHONE (OUTPATIENT)
Dept: SURGERY | Facility: CLINIC | Age: 82
End: 2022-09-19

## 2022-09-21 NOTE — TELEPHONE ENCOUNTER
Pt returned my phone call yesterday. She stated that she would like sooner sx date if available. I told pt that I would see what I could do and call her back today. Pt voiced understanding.  Spoke to Shakila in sx jass   Called pt to inform her I was able to jass her sx for 9/26. Pt was in agreement.

## 2022-09-23 ENCOUNTER — ANESTHESIA (OUTPATIENT)
Dept: PERIOP | Facility: HOSPITAL | Age: 82
End: 2022-09-23

## 2022-10-14 ENCOUNTER — HOSPITAL ENCOUNTER (INPATIENT)
Facility: HOSPITAL | Age: 82
LOS: 1 days | Discharge: HOME | End: 2022-10-14
Attending: SURGERY | Admitting: SURGERY

## 2022-10-14 VITALS
BODY MASS INDEX: 34.85 KG/M2 | DIASTOLIC BLOOD PRESSURE: 77 MMHG | OXYGEN SATURATION: 94 % | SYSTOLIC BLOOD PRESSURE: 131 MMHG | HEART RATE: 68 BPM | RESPIRATION RATE: 20 BRPM | HEIGHT: 64 IN | WEIGHT: 204.15 LBS | TEMPERATURE: 97 F

## 2022-10-14 DIAGNOSIS — K62.3 RECTAL PROLAPSE: ICD-10-CM

## 2022-10-14 PROCEDURE — 25010000002 DEXAMETHASONE PER 1 MG: Performed by: NURSE ANESTHETIST, CERTIFIED REGISTERED

## 2022-10-14 PROCEDURE — 0DN84ZZ RELEASE SMALL INTESTINE, PERCUTANEOUS ENDOSCOPIC APPROACH: ICD-10-PCS | Performed by: SURGERY

## 2022-10-14 PROCEDURE — 45400 LAPAROSCOPIC PROC: CPT | Performed by: SURGERY

## 2022-10-14 PROCEDURE — 25010000002 MIDAZOLAM PER 1 MG: Performed by: ANESTHESIOLOGY

## 2022-10-14 PROCEDURE — 25010000002 BUPRENORPHINE PER 0.1 MG: Performed by: SURGERY

## 2022-10-14 PROCEDURE — 25010000002 ONDANSETRON PER 1 MG: Performed by: NURSE ANESTHETIST, CERTIFIED REGISTERED

## 2022-10-14 PROCEDURE — 0DSP4ZZ REPOSITION RECTUM, PERCUTANEOUS ENDOSCOPIC APPROACH: ICD-10-PCS | Performed by: SURGERY

## 2022-10-14 PROCEDURE — 45400 LAPAROSCOPIC PROC: CPT

## 2022-10-14 PROCEDURE — 25010000002 PROPOFOL 10 MG/ML EMULSION: Performed by: NURSE ANESTHETIST, CERTIFIED REGISTERED

## 2022-10-14 PROCEDURE — 0DNP4ZZ RELEASE RECTUM, PERCUTANEOUS ENDOSCOPIC APPROACH: ICD-10-PCS | Performed by: SURGERY

## 2022-10-14 PROCEDURE — 0DNN4ZZ RELEASE SIGMOID COLON, PERCUTANEOUS ENDOSCOPIC APPROACH: ICD-10-PCS | Performed by: SURGERY

## 2022-10-14 PROCEDURE — S2900 ROBOTIC SURGICAL SYSTEM: HCPCS | Performed by: SURGERY

## 2022-10-14 PROCEDURE — 8E0W4CZ ROBOTIC ASSISTED PROCEDURE OF TRUNK REGION, PERCUTANEOUS ENDOSCOPIC APPROACH: ICD-10-PCS | Performed by: SURGERY

## 2022-10-14 PROCEDURE — 25010000002 CEFAZOLIN IN DEXTROSE 2-4 GM/100ML-% SOLUTION: Performed by: SURGERY

## 2022-10-14 PROCEDURE — 25010000002 DEXAMETHASONE PER 1 MG: Performed by: SURGERY

## 2022-10-14 PROCEDURE — 25010000002 FENTANYL CITRATE (PF) 50 MCG/ML SOLUTION: Performed by: NURSE ANESTHETIST, CERTIFIED REGISTERED

## 2022-10-14 PROCEDURE — 0 HYDROMORPHONE 1 MG/ML SOLUTION: Performed by: NURSE ANESTHETIST, CERTIFIED REGISTERED

## 2022-10-14 DEVICE — ABSORBABLE WOUND CLOSURE DEVICE
Type: IMPLANTABLE DEVICE | Site: PELVIS | Status: FUNCTIONAL
Brand: V-LOC 90

## 2022-10-14 RX ORDER — ONDANSETRON 2 MG/ML
4 INJECTION INTRAMUSCULAR; INTRAVENOUS ONCE AS NEEDED
Status: DISCONTINUED | OUTPATIENT
Start: 2022-10-14 | End: 2022-10-14 | Stop reason: HOSPADM

## 2022-10-14 RX ORDER — FENTANYL CITRATE 50 UG/ML
INJECTION, SOLUTION INTRAMUSCULAR; INTRAVENOUS AS NEEDED
Status: DISCONTINUED | OUTPATIENT
Start: 2022-10-14 | End: 2022-10-14 | Stop reason: SURG

## 2022-10-14 RX ORDER — DEXMEDETOMIDINE HYDROCHLORIDE 100 UG/ML
INJECTION, SOLUTION INTRAVENOUS AS NEEDED
Status: DISCONTINUED | OUTPATIENT
Start: 2022-10-14 | End: 2022-10-14 | Stop reason: SURG

## 2022-10-14 RX ORDER — SODIUM CHLORIDE, SODIUM LACTATE, POTASSIUM CHLORIDE, CALCIUM CHLORIDE 600; 310; 30; 20 MG/100ML; MG/100ML; MG/100ML; MG/100ML
50 INJECTION, SOLUTION INTRAVENOUS CONTINUOUS
Status: DISCONTINUED | OUTPATIENT
Start: 2022-10-14 | End: 2022-10-14 | Stop reason: HOSPADM

## 2022-10-14 RX ORDER — ROCURONIUM BROMIDE 10 MG/ML
INJECTION, SOLUTION INTRAVENOUS AS NEEDED
Status: DISCONTINUED | OUTPATIENT
Start: 2022-10-14 | End: 2022-10-14 | Stop reason: SURG

## 2022-10-14 RX ORDER — FENTANYL CITRATE 50 UG/ML
INJECTION, SOLUTION INTRAMUSCULAR; INTRAVENOUS AS NEEDED
Status: DISCONTINUED | OUTPATIENT
Start: 2022-10-14 | End: 2022-10-14

## 2022-10-14 RX ORDER — ONDANSETRON 2 MG/ML
INJECTION INTRAMUSCULAR; INTRAVENOUS AS NEEDED
Status: DISCONTINUED | OUTPATIENT
Start: 2022-10-14 | End: 2022-10-14 | Stop reason: SURG

## 2022-10-14 RX ORDER — PROPOFOL 10 MG/ML
VIAL (ML) INTRAVENOUS AS NEEDED
Status: DISCONTINUED | OUTPATIENT
Start: 2022-10-14 | End: 2022-10-14 | Stop reason: SURG

## 2022-10-14 RX ORDER — OXYCODONE HYDROCHLORIDE 5 MG/1
5 TABLET ORAL
Status: DISCONTINUED | OUTPATIENT
Start: 2022-10-14 | End: 2022-10-14 | Stop reason: HOSPADM

## 2022-10-14 RX ORDER — LIDOCAINE HYDROCHLORIDE 20 MG/ML
INJECTION, SOLUTION EPIDURAL; INFILTRATION; INTRACAUDAL; PERINEURAL AS NEEDED
Status: DISCONTINUED | OUTPATIENT
Start: 2022-10-14 | End: 2022-10-14 | Stop reason: SURG

## 2022-10-14 RX ORDER — EPHEDRINE SULFATE 50 MG/ML
INJECTION, SOLUTION INTRAVENOUS AS NEEDED
Status: DISCONTINUED | OUTPATIENT
Start: 2022-10-14 | End: 2022-10-14 | Stop reason: SURG

## 2022-10-14 RX ORDER — SODIUM CHLORIDE, SODIUM LACTATE, POTASSIUM CHLORIDE, CALCIUM CHLORIDE 600; 310; 30; 20 MG/100ML; MG/100ML; MG/100ML; MG/100ML
9 INJECTION, SOLUTION INTRAVENOUS CONTINUOUS PRN
Status: DISCONTINUED | OUTPATIENT
Start: 2022-10-14 | End: 2022-10-14 | Stop reason: HOSPADM

## 2022-10-14 RX ORDER — ACETAMINOPHEN 500 MG
1000 TABLET ORAL ONCE
Status: COMPLETED | OUTPATIENT
Start: 2022-10-14 | End: 2022-10-14

## 2022-10-14 RX ORDER — MIDAZOLAM HYDROCHLORIDE 1 MG/ML
1 INJECTION INTRAMUSCULAR; INTRAVENOUS ONCE
Status: COMPLETED | OUTPATIENT
Start: 2022-10-14 | End: 2022-10-14

## 2022-10-14 RX ORDER — CEFAZOLIN SODIUM 2 G/100ML
2 INJECTION, SOLUTION INTRAVENOUS ONCE
Status: COMPLETED | OUTPATIENT
Start: 2022-10-14 | End: 2022-10-14

## 2022-10-14 RX ORDER — LABETALOL HYDROCHLORIDE 5 MG/ML
INJECTION, SOLUTION INTRAVENOUS AS NEEDED
Status: DISCONTINUED | OUTPATIENT
Start: 2022-10-14 | End: 2022-10-14 | Stop reason: SURG

## 2022-10-14 RX ORDER — OXYCODONE HYDROCHLORIDE AND ACETAMINOPHEN 5; 325 MG/1; MG/1
1 TABLET ORAL EVERY 6 HOURS PRN
Qty: 12 TABLET | Refills: 0 | Status: SHIPPED | OUTPATIENT
Start: 2022-10-14

## 2022-10-14 RX ORDER — DEXAMETHASONE SODIUM PHOSPHATE 4 MG/ML
INJECTION, SOLUTION INTRA-ARTICULAR; INTRALESIONAL; INTRAMUSCULAR; INTRAVENOUS; SOFT TISSUE AS NEEDED
Status: DISCONTINUED | OUTPATIENT
Start: 2022-10-14 | End: 2022-10-14 | Stop reason: SURG

## 2022-10-14 RX ORDER — MAGNESIUM HYDROXIDE 1200 MG/15ML
LIQUID ORAL AS NEEDED
Status: DISCONTINUED | OUTPATIENT
Start: 2022-10-14 | End: 2022-10-14 | Stop reason: HOSPADM

## 2022-10-14 RX ORDER — POLYETHYLENE GLYCOL 3350 17 G/17G
17 POWDER, FOR SOLUTION ORAL DAILY
Qty: 10 PACKET | Refills: 0 | Status: SHIPPED | OUTPATIENT
Start: 2022-10-14

## 2022-10-14 RX ORDER — MEPERIDINE HYDROCHLORIDE 25 MG/ML
12.5 INJECTION INTRAMUSCULAR; INTRAVENOUS; SUBCUTANEOUS
Status: DISCONTINUED | OUTPATIENT
Start: 2022-10-14 | End: 2022-10-14 | Stop reason: HOSPADM

## 2022-10-14 RX ADMIN — EPHEDRINE SULFATE 10 MG: 50 INJECTION INTRAVENOUS at 10:54

## 2022-10-14 RX ADMIN — ROCURONIUM BROMIDE 20 MG: 10 INJECTION INTRAVENOUS at 11:17

## 2022-10-14 RX ADMIN — LABETALOL 20 MG/4 ML (5 MG/ML) INTRAVENOUS SYRINGE 5 MG: at 10:32

## 2022-10-14 RX ADMIN — CEFAZOLIN SODIUM 2 G: 2 INJECTION, SOLUTION INTRAVENOUS at 09:56

## 2022-10-14 RX ADMIN — LIDOCAINE HYDROCHLORIDE 100 MG: 20 INJECTION, SOLUTION EPIDURAL; INFILTRATION; INTRACAUDAL; PERINEURAL at 09:54

## 2022-10-14 RX ADMIN — MIDAZOLAM HYDROCHLORIDE 1 MG: 1 INJECTION, SOLUTION INTRAMUSCULAR; INTRAVENOUS at 09:41

## 2022-10-14 RX ADMIN — EPHEDRINE SULFATE 10 MG: 50 INJECTION INTRAVENOUS at 11:26

## 2022-10-14 RX ADMIN — DEXMEDETOMIDINE HYDROCHLORIDE 10 MCG: 100 INJECTION, SOLUTION, CONCENTRATE INTRAVENOUS at 11:32

## 2022-10-14 RX ADMIN — DEXMEDETOMIDINE HYDROCHLORIDE 10 MCG: 100 INJECTION, SOLUTION, CONCENTRATE INTRAVENOUS at 10:25

## 2022-10-14 RX ADMIN — HYDROMORPHONE HYDROCHLORIDE 0.25 MG: 1 INJECTION, SOLUTION INTRAMUSCULAR; INTRAVENOUS; SUBCUTANEOUS at 11:51

## 2022-10-14 RX ADMIN — DEXAMETHASONE SODIUM PHOSPHATE 8 MG: 4 INJECTION, SOLUTION INTRA-ARTICULAR; INTRALESIONAL; INTRAMUSCULAR; INTRAVENOUS; SOFT TISSUE at 10:00

## 2022-10-14 RX ADMIN — ROCURONIUM BROMIDE 20 MG: 10 INJECTION INTRAVENOUS at 10:40

## 2022-10-14 RX ADMIN — DEXMEDETOMIDINE HYDROCHLORIDE 10 MCG: 100 INJECTION, SOLUTION, CONCENTRATE INTRAVENOUS at 11:17

## 2022-10-14 RX ADMIN — EPHEDRINE SULFATE 10 MG: 50 INJECTION INTRAVENOUS at 12:04

## 2022-10-14 RX ADMIN — PROPOFOL 120 MG: 10 INJECTION, EMULSION INTRAVENOUS at 09:54

## 2022-10-14 RX ADMIN — ROCURONIUM BROMIDE 10 MG: 10 INJECTION INTRAVENOUS at 11:51

## 2022-10-14 RX ADMIN — HYDROMORPHONE HYDROCHLORIDE 0.25 MG: 1 INJECTION, SOLUTION INTRAMUSCULAR; INTRAVENOUS; SUBCUTANEOUS at 11:24

## 2022-10-14 RX ADMIN — EPHEDRINE SULFATE 10 MG: 50 INJECTION INTRAVENOUS at 12:08

## 2022-10-14 RX ADMIN — ROCURONIUM BROMIDE 50 MG: 10 INJECTION INTRAVENOUS at 09:54

## 2022-10-14 RX ADMIN — FENTANYL CITRATE 25 MCG: 50 INJECTION, SOLUTION INTRAMUSCULAR; INTRAVENOUS at 09:53

## 2022-10-14 RX ADMIN — ONDANSETRON 4 MG: 2 INJECTION INTRAMUSCULAR; INTRAVENOUS at 11:54

## 2022-10-14 RX ADMIN — DEXMEDETOMIDINE HYDROCHLORIDE 10 MCG: 100 INJECTION, SOLUTION, CONCENTRATE INTRAVENOUS at 09:58

## 2022-10-14 RX ADMIN — ACETAMINOPHEN 1000 MG: 500 TABLET, FILM COATED ORAL at 09:09

## 2022-10-14 RX ADMIN — SODIUM CHLORIDE, POTASSIUM CHLORIDE, SODIUM LACTATE AND CALCIUM CHLORIDE 9 ML/HR: 600; 310; 30; 20 INJECTION, SOLUTION INTRAVENOUS at 09:09

## 2022-10-14 RX ADMIN — FENTANYL CITRATE 25 MCG: 50 INJECTION, SOLUTION INTRAMUSCULAR; INTRAVENOUS at 10:05

## 2022-10-14 RX ADMIN — SUGAMMADEX 200 MG: 100 INJECTION, SOLUTION INTRAVENOUS at 12:01

## 2022-10-14 NOTE — DISCHARGE INSTRUCTIONS
DISCHARGE INSTRUCTIONS  SURGICAL / AMBULATORY  PROCEDURES      For your surgery you had:  General anesthesia (you may have a sore throat for the first 24 hours)  You may experience dizziness, drowsiness, or light-headedness for several hours following surgery/procedure.  Do not stay alone today or tonight.  Limit your activity for 24 hours.  Resume your diet slowly.  Follow whatever special dietary instructions you may have been given by your doctor.  You should not drive or operate machinery, drink alcohol, or sign legally binding documents for 24 hours or while you are taking pain medication.  Last dose of pain medication was given at:   .  NOTIFY YOUR DOCTOR IF YOU EXPERIENCE ANY OF THE FOLLOWING:  Temperature greater than 101 degrees Fahrenheit  Shaking Chills  Redness or excessive drainage from incision  Nausea, vomiting and/or pain that is not controlled by prescribed medications  Increase in bleeding or bleeding that is excessive  Unable to urinate in 6 hours after surgery  If unable to reach your doctor, please go to the closest Emergency Room    .  You may shower  in 2 days  .  Apply an ice pack 24-48 hours.  Medications per physician instructions as indicated on Discharge Medication Information Sheet.    SPECIAL INSTRUCTIONS:  No lifting for 5-10for 2wks

## 2022-10-14 NOTE — OP NOTE
OP NOTE  RECTOPEXY WITH DAVINCI ROBOT  Procedure Report    Patient Name:  Chula Adkins  YOB: 1940  [id number]    Date of Surgery:  10/14/2022     Indications: See last clinic note for indications, discussion risk benefits and alternative    Pre-op Diagnosis:   Rectal prolapse [K62.3]       Post-Op Diagnosis Codes:     * Rectal prolapse [K62.3]    Procedure/CPT® Codes:      Procedure(s):  ROBOT ASSISTED  RECTOPEXY    Staff:  Surgeon(s):  Navjot Diaz MD    Assistant: Jeannie Malik CSA    Anesthesia: General, Local    Estimated Blood Loss: 10 mL    Implants:    Implant Name Type Inv. Item Serial No.  Lot No. LRB No. Used Action   DEV CLS WND VLOC/90 BLU ABS 1/2CIR SZ3/0 26MM 15CM EKATERINA - [id number] Implant DEV CLS WND VLOC/90 BLU ABS 1/2CIR SZ3/0 26MM 15CM [facility]  [employer]rightfishID[employer] [id number] N/A 1 Implanted       Specimen:          None      Findings: Difficult case due to patient's multiple previous abdominal surgery.  Laparoscopic and robotic lysis of adhesions for 30 minutes in order to obtain exposure.  Robotic rectopexy performed.    Complications: None    Description of Procedure:   After all questions were answered, consent was verified.  She was brought the operating room per stretcher placed in supine position arms out all extremities padded.  Bilateral lower extremity SCDs placed.  General tracheal anesthesia induced.  Preoperative IV antibiotics administered.  Bilateral upper extremities padded and tucked appropriately.  Patient placed in lithotomy.  Patient's abdomen and perineum prepped with ChloraPrep.  Waited 3 minutes.  Draped in usual sterile fashion.  Ioban applied.  Critical timeout taken.  Began the procedure with a stab incision in Salvador's point.  I placed a Veress needle.  Positive saline drop test.  I obtain pneumoperitoneum with CO2 insufflation.  I made a transverse incision in the right mid abdomen.  I placed a robotic 8 trocar and Optiview fashion.  No  injury to viscera below from entry into the abdomen.  I then placed a robotic 8 trocar in the left upper quadrant x2.  I infiltrated with local anesthesia bilateral upper quadrant in a tap block fashion.  There were adhesions to the right lower quadrant.  I then performed laparoscopic lysis of adhesions to free the small bowel from the anterior abdominal wall.  No injury to the small bowel from sharp lysis of adhesions.  I then placed a robotic 8 trocar in the right lower quadrant.  I placed the patient in Trendelenburg and rotated to the right.  I swept the small bowel out of the pelvis.  There were adhesions which were taken down sharply laparoscopically.  I was then able to retract the small bowel to the right upper quadrant.  We then docked the robot placed instruments.  We placed a Ray-Mary Kay inside the abdomen.  I then performed an additional approximately 30 minutes of lysis of adhesions freeing the sigmoid colon and rectum adhesions from previous surgery.  During this time nursing placed a Eng catheter to help with exposure.  I then placed the sigmoid colon on tension and entered the plane between the sigmoid mesentery and the retroperitoneum dissecting down to the pelvic floor in a total mesorectal excision plane.  The right lateral stalk was divided to the pelvic floor as well.  I then cleared the sacrum identifying periosteum right ureter seen and preserved.  I then placed the rectum on stretch and secured this to the sacrum periosteum with interrupted silk suture x2.  I then closed the peritoneal incision with a running V-Loc suture.  We removed needles and obtained a correct count.  Removed a Ray-Mary Kay.  We removed instruments and undocked the robot.  We desufflated the abdomen.  Surgical first assist closed the incisions with Monocryl and skin glue.  At the end of the procedure all counts were correct.  I was present for the procedure    Assistant: Jeannie Malik CSA was responsible for performing  the following activities: Retraction, Suturing, Closing and Placing instruments with the robot and their skilled assistance was necessary for the success of this case.    Navjot Diaz MD     Date: 10/14/2022  Time: 12:11 EDT

## 2022-10-14 NOTE — ANESTHESIA PREPROCEDURE EVALUATION
Anesthesia Evaluation     Patient summary reviewed and Nursing notes reviewed   no history of anesthetic complications:  NPO Solid Status: > 8 hours  NPO Liquid Status: > 2 hours           Airway   Mallampati: II  TM distance: >3 FB  Neck ROM: full  No difficulty expected  Dental    (+) upper dentures    Pulmonary - normal exam    breath sounds clear to auscultation  (+) pneumonia ,   Cardiovascular - normal exam  Exercise tolerance: good (4-7 METS)    Rhythm: regular  Rate: normal    (+) hypertension, CHF , hyperlipidemia,       Neuro/Psych- negative ROS  GI/Hepatic/Renal/Endo - negative ROS     Musculoskeletal (-) negative ROS    Abdominal    Substance History - negative use     OB/GYN negative ob/gyn ROS         Other - negative ROS       ROS/Med Hx Other: PROCEDURE- HX OF CHF, HTN- METS<4. +SOAE (RECOVERING FROM RECENT PNEUMONIA 08/2022 REPEAT CHEST XRAY 8/29/22 STATES LUNGS BETTER) , NO CP. EKG 9/13/22 SINUS [facility]THYM. [facility]/ DR. CHAVEZ                 Anesthesia Plan    ASA 3     general     (Patient understands anesthesia not responsible for dental damage.)  intravenous induction     Anesthetic plan, risks, benefits, and alternatives have been provided, discussed and informed consent has been obtained with: patient.    Use of blood products discussed with patient .   Plan discussed with CRNA.        CODE STATUS:

## 2022-10-14 NOTE — H&P
No changes since last seen    History of present illness:    She has a 6-month history of rectal prolapse.  Previous perineal proctosigmoidectomy 2019 at outside facility.  Operative report reviewed.  She has daily episodes of fecal incontinence.  No blood thinner use.  No tobacco use.  No chest pain.  Previous total abdominal hysterectomy, bladder repair, colon resection, laparoscopic cholecystectomy.  No alleviating factors.  No imaging.        History:  Medical History        Past Medical History:   Diagnosis Date   • CHF (congestive heart failure) (HCC)     • Constipation     • Fibromyalgia     • Hemorrhoids     • Hyperlipidemia     • Hypertension     • Pain in back     • Restless leg syndrome              Surgical History         Past Surgical History:   Procedure Laterality Date   • BLADDER REPAIR       • COLONOSCOPY       • ELBOW PROCEDURE       • FOOT SURGERY       • GALLBLADDER SURGERY       • HYSTERECTOMY       • SHOULDER SURGERY                      Family History   Problem Relation Age of Onset   • Stroke Sister     • Hypertension Neg Hx     • Diabetes Neg Hx     • Cancer Neg Hx           Social History           Tobacco Use   • Smoking status: Never Smoker   • Smokeless tobacco: Never Used   Vaping Use   • Vaping Use: Never used   Substance Use Topics   • Alcohol use: Not Currently   • Drug use: Never         Review of Systems  All systems were reviewed and negative except for:   Review of Systems   Constitutional: Negative for chills, fever and unexpected weight loss.   HENT: Negative for congestion, nosebleeds and voice change.    Eyes: Negative for blurred vision, double vision and discharge.   Respiratory: Negative for apnea, chest tightness and shortness of breath.    Cardiovascular: Negative for chest pain and leg swelling.   Gastrointestinal:        See HPI   Endocrine: Negative for cold intolerance and heat intolerance.   Genitourinary: Negative for dysuria, hematuria and urgency.    Musculoskeletal: Negative for back pain, joint swelling and neck pain.   Skin: Negative for color change and dry skin.   Neurological: Negative for dizziness and confusion.   Hematological: Negative for adenopathy.   Psychiatric/Behavioral: Negative for agitation and behavioral problems.      MEDS:          Prior to Admission medications    Medication Sig Start Date End Date Taking? Authorizing Provider   amitriptyline (ELAVIL) 50 MG tablet   9/2/20   Yes ProviderKevin MD   bumetanide (BUMEX) 2 MG tablet Take 2 mg by mouth Daily.     Yes Provider, MD Kevin   buPROPion XL (WELLBUTRIN XL) 300 MG 24 hr tablet   9/2/20   Yes Provider, MD Kevin   carvedilol (COREG) 3.125 MG tablet   9/2/20   Yes Provider, MD Kevin   Diclofenac Sodium (VOLTAREN) 1 % gel gel Apply 4 g topically to the appropriate area as directed 4 (Four) Times a Day As Needed (AS NEEDED). 10/8/21   Yes Jose Richter MD   dicyclomine (BENTYL) 20 MG tablet   6/27/22   Yes Provider, MD Kevin   DULoxetine (CYMBALTA) 30 MG capsule Cymbalta 30 mg oral capsule,delayed release(DR/EC) take 1 capsule by oral route daily   Suspended     Yes Emergency, Nurse Leonard RN   fluocinonide (LIDEX) 0.05 % external solution APPLY TOPICALLY TO THE SCALP EVERY DAY AS NEEDED FOR ITCHING 8/4/22   Yes Provider, MD Kevin   ketoconazole (NIZORAL) 2 % shampoo APPLY 10 ML TOPICALLY TO THE SCALP EVERY 14 DAYS 6/27/22   Yes Provider, MD Kevin   Magnesium 500 MG capsule Take  by mouth.     Yes Provider, MD Kevin   MULTIPLE VITAMINS-MINERALS PO Take  by mouth.     Yes Provider, MD Kevin   Omega-3 Fatty Acids (fish oil) 1000 MG capsule capsule Take  by mouth.     Yes ProviderKevin MD   oxyCODONE-acetaminophen (PERCOCET)  MG per tablet TK 1 T PO Q 12 H PRN 9/8/20   Yes ProviderKevin MD   Plecanatide (Trulance) 3 MG tablet Daily.     Yes Emergency, Nurse Leonard, RN   rOPINIRole XL (REQUIP XL) 4 MG 24 hr tablet  Take 4 mg by mouth Daily. 10/12/21   Yes Emergency, Nurse LISA Valle   rosuvastatin (CRESTOR) 10 MG tablet Take 10 mg by mouth Daily.     Yes Kevin Wiggins MD   simvastatin (ZOCOR) 40 MG tablet   8/21/21   Yes Emergency, Nurse Leonard RN   Synthroid 25 MCG tablet   7/16/22   Yes Kevin Wiggins MD   minocycline (MINOCIN,DYNACIN) 100 MG capsule Take 100 mg by mouth 2 (Two) Times a Day. for 14 days 8/4/22     Kevin Wiggins MD   oxyCODONE-acetaminophen (PERCOCET)  MG per tablet Every 12 (Twelve) Hours. 7/18/22     Kevin Wiggins MD   rOPINIRole (REQUIP) 3 MG tablet   5/15/22     Kevin Wiggins MD         Allergies:  Patient has no known allergies.           Objective         Vital Signs      Physical Exam:                General Appearance:    Alert, cooperative, in no acute distress   Head:    Normocephalic, without obvious abnormality, atraumatic   Eyes:          Conjunctivae and sclerae normal, no icterus,      Ears:    Ears appear intact with no abnormalities noted   Throat:   No oral lesions, no thrush, oral mucosa moist   Neck:   No adenopathy, supple, trachea midline, no thyromegaly   Back:     No kyphosis present, no scoliosis present, no skin lesions,      erythema or scars, no tenderness to percussion or                   palpation,   range of motion normal   Lungs:     Clear to auscultation,respirations regular, even and                  unlabored    Heart:    Regular rhythm and normal rate, normal S1 and S2, no            murmur, no gallop, no rub, no click   Chest Wall:    No abnormalities observed   Abdomen:     Normal bowel sounds, no masses, no organomegaly, soft        non-tender, non-distended, no guarding, no rebound                tenderness   Rectal:    Approximately 2 inch full-thickness rectal prolapse noted with patient straining on the toilet   Extremities:   Moves all extremities well, no edema, no cyanosis, no             redness   Pulses:   Pulses  "palpable and equal bilaterally   Skin:   No bleeding, bruising or rash   Lymph nodes:   No palpable adenopathy   Neurologic:   A/o x 4 with no deficits         Results Review:              {Results Review:69360::\"I reviewed the patient's new clinical results.\"     LABS/IMAGING:        Results for orders placed or performed during the hospital encounter of 11/26/21   POCT Influenza A/B     Specimen: Swab   Result Value Ref Range     Rapid Influenza A Ag Negative Negative     Rapid Influenza B Ag Negative Negative     Internal Control Passed Passed     Lot Number [id number]       Expiration Date 50,122     POCT SARS-CoV-2 Antigen EDWAR   ([facility])     Specimen: Swab   Result Value Ref Range     SARS Antigen Not Detected Not Detected     Internal Control Passed Passed     Lot Number [id number]       Expiration Date 31,823                 Result Review    :            Assessment & Plan        Recurrent rectal prolapse with history of perineal proctosigmoidectomy     I explained to the patient what rectal prolapse was.  She was instructed to reduce the prolapse as soon as it occurs.  Go to the emergency department for an unable to reduce.  I recommended robotic possible open rectopexy.  I explained the procedure and recovery.  Benefits and alternatives discussed.  Risk procedure including risk of anesthesia, bleeding, infection, conversion open, recurrence, damage to surrounding structures including ureters, heart attack, stroke, blood clot, pneumonia were discussed.  All questions answered.  She agrees with the plan.    "

## 2022-10-14 NOTE — ANESTHESIA POSTPROCEDURE EVALUATION
Patient: Chula Adkins    Procedure Summary     Date: 10/14/22 Room / Location: [facility] OR [address] [place] [address] OR [facility]    Anesthesia Start: 0947 Anesthesia Stop: 1222    Procedure: ROBOT ASSISTED  RECTOPEXY Diagnosis:       Rectal prolapse      (Rectal prolapse [K62.3])    Surgeons: Navjot Diaz MD Provider: Mike Quinonez MD    Anesthesia Type: general ASA Status: 3          Anesthesia Type: general    Vitals  Vitals Value Taken Time   /82 10/14/22 1252   Temp 36.1 °C (97 °F) 10/14/22 1236   Pulse 61 10/14/22 1253   Resp 20 10/14/22 1236   SpO2 91 % 10/14/22 1253   Vitals shown include unvalidated device data.        Post Anesthesia Care and Evaluation    Patient location during evaluation: bedside  Patient participation: complete - patient participated  Level of consciousness: awake, responsive to verbal stimuli, responsive to light touch, responsive to noxious stimuli, responsive to painful stimuli and responsive to physical stimuli  Pain management: adequate    Airway patency: patent  Anesthetic complications: No anesthetic complications  PONV Status: none  Cardiovascular status: acceptable and stable  Respiratory status: acceptable and nasal cannula  Hydration status: acceptable    Comments: An Anesthesiologist personally participated in the most demanding procedures (including induction and emergence if applicable) in the anesthesia plan, monitored the course of anesthesia administration at frequent intervals and remained physically present and available for immediate diagnosis and treatment of emergencies.

## 2022-10-15 ENCOUNTER — READMISSION MANAGEMENT (OUTPATIENT)
Dept: CALL CENTER | Facility: HOSPITAL | Age: 82
End: 2022-10-15

## 2022-10-15 NOTE — OUTREACH NOTE
Prep Survey    Flowsheet Row Responses   [facility] facility patient discharged from? [facility]   Is LACE score < 7 ? Yes   Emergency Room discharge w/ pulse ox? No   Eligibility Not Eligible  [low risk for readmit]   What are the reasons patient is not eligible? Other   Does the patient have one of the following disease processes/diagnoses(primary or secondary)? Other   Prep survey completed? Yes          SHERIF BEST - Registered Nurse

## 2022-10-17 ENCOUNTER — TELEPHONE (OUTPATIENT)
Dept: SURGERY | Facility: CLINIC | Age: 82
End: 2022-10-17

## 2022-10-17 NOTE — TELEPHONE ENCOUNTER
Per Dr. Diaz:  Double up on MiraLAX till bowel movement.    I called the patient and told her to double up on MiraLax until bowel movement.

## 2022-10-17 NOTE — TELEPHONE ENCOUNTER
ROBOT ASSISTED  RECTOPEXY 10/14/22.    Patient has not had a bowel movement.  She has taken Miralax.  She asked if she can drink Citracel, or what to do.

## 2022-10-18 ENCOUNTER — TELEPHONE (OUTPATIENT)
Dept: ORTHOPEDIC SURGERY | Facility: CLINIC | Age: 82
End: 2022-10-18

## 2022-10-18 NOTE — TELEPHONE ENCOUNTER
PT'S  SAID SHE WILL CALL BACK AT LATER TIME TO RESCHEDULE APT FOR TODAY. WAS CANCELLED DUE TO PT BEING SICK. OK FOR HUB TO SCHEDULE.

## 2022-10-25 ENCOUNTER — OFFICE VISIT (OUTPATIENT)
Dept: ORTHOPEDIC SURGERY | Facility: CLINIC | Age: 82
End: 2022-10-25

## 2022-10-25 VITALS — OXYGEN SATURATION: 97 % | WEIGHT: 210 LBS | HEART RATE: 76 BPM | HEIGHT: 64 IN | BODY MASS INDEX: 35.85 KG/M2

## 2022-10-25 DIAGNOSIS — M19.011 OSTEOARTHRITIS OF RIGHT SHOULDER, UNSPECIFIED OSTEOARTHRITIS TYPE: Primary | ICD-10-CM

## 2022-10-25 PROCEDURE — 99213 OFFICE O/P EST LOW 20 MIN: CPT | Performed by: ORTHOPAEDIC SURGERY

## 2022-10-25 NOTE — PROGRESS NOTES
"Chief Complaint  Follow-up of the Right Shoulder     Subjective      Chula Adkins presents to [facility] for right shoulder. Patient wants to get a injection in the shoulder. Patient just had a surgery on the 14th of October for a rectoplexy. Patient here today as wants steroid injection.  Patient has a stainless steel refrigerator and when she washes it she hear a lot of grinding in it.      No Known Allergies     Social History     Socioeconomic History   • Marital status:    Tobacco Use   • Smoking status: Never   • Smokeless tobacco: Never   Vaping Use   • Vaping Use: Never used   Substance and Sexual Activity   • Alcohol use: Not Currently   • Drug use: Never   • Sexual activity: Defer        Review of Systems     Objective   Vital Signs:   Pulse 76   Ht 162.6 cm (64\")   Wt 95.3 kg (210 lb)   SpO2 97%   BMI 36.05 kg/m²       Physical Exam  Constitutional:       Appearance: Normal appearance. Patient is well-developed and normal weight.   HENT:      Head: Normocephalic.      Right Ear: Hearing and external ear normal.      Left Ear: Hearing and external ear normal.      Nose: Nose normal.   Eyes:      Conjunctiva/sclera: Conjunctivae normal.   Cardiovascular:      Rate and Rhythm: Normal rate.   Pulmonary:      Effort: Pulmonary effort is normal.      Breath sounds: No wheezing or rales.   Abdominal:      Palpations: Abdomen is soft.      Tenderness: There is no abdominal tenderness.   Musculoskeletal:      Cervical back: Normal range of motion.   Skin:     Findings: No rash.   Neurological:      Mental Status: Patient is alert and oriented to person, place, and time.   Psychiatric:         Mood and Affect: Mood and affect normal.         Judgment: Judgment normal.       Ortho Exam      RIGHT SHOULDER No swelling. No skin discoloration or muscle atrophy. Radial pulse 2+. Ulnar pulse 2+. Good tone of deltoid, bicep, triceps, wrist extensors and flexors. Abduction 90 " degrees.  Impingement testing positive. Tender subacromial bursa. Tender greater tuberosity. crepitus with motion of left shoulder. Full elbow flexion and extension. Forward elevation to 120 degrees.pain with shoulder ROM.       Procedures      Imaging Results (Most Recent)     None           Result Review :             Assessment and Plan     Diagnoses and all orders for this visit:    1. Osteoarthritis of right shoulder, unspecified osteoarthritis type (Primary)        Injection was given in August.  Patient can come back after November 3rd for injection.     Call or return if worsening symptoms.    Follow Up     PRN      Patient was given instructions and counseling regarding her condition or for health maintenance advice. Please see specific information pulled into the AVS if appropriate.     Scribed for Jose Richter MD by Ana Ramon MA.  10/25/22   14:42 EDT    I have personally performed the services described in this document as scribed by the above individual and it is both accurate and complete. Jose Richter MD 10/27/22

## 2022-10-27 ENCOUNTER — OFFICE VISIT (OUTPATIENT)
Dept: SURGERY | Facility: CLINIC | Age: 82
End: 2022-10-27

## 2022-10-27 VITALS — BODY MASS INDEX: 35.17 KG/M2 | RESPIRATION RATE: 16 BRPM | HEIGHT: 64 IN | WEIGHT: 206 LBS

## 2022-10-27 DIAGNOSIS — K62.3 RECTAL PROLAPSE: Primary | ICD-10-CM

## 2022-10-27 PROCEDURE — 99024 POSTOP FOLLOW-UP VISIT: CPT | Performed by: SURGERY

## 2022-10-28 NOTE — PROGRESS NOTES
.mb  General Surgery/Colorectal Surgery Note    Patient Name:  Chula Adkins  YOB: 1940  [id number]    Referring Provider: No ref. provider found      Patient Care Team:  Owen Mathias MD as PCP - General (Internal Medicine)  Ha Paige MD as Consulting Physician (General Surgery)    Chief complaint follow-up surgery    Subjective .     History of present illness:    Status post robotic rectopexy for rectal prolapse 10/14/2022    She comes in for follow-up.  No prolapse.  No fecal incontinence.  No pain.  No fever.  She is pleased with her surgery.  She is using fiber      History:  Past Medical History:   Diagnosis Date   • CHF (congestive heart failure) ([facility])     NO RECENT ISSUES   • Constipation    • Fibromyalgia    • Hemorrhoids    • Hyperlipidemia    • Hypertension    • Pain in back     SPONDYLOSIS, LUMBAR, PAIN [facility] CLINIC   • Pneumonia     8/25/22, RESOLVING, SOA W/EX. REMAINS DENIES COUGH OR PAIN   • Rectal prolapse    • Restless leg syndrome    • Salivary stones        Past Surgical History:   Procedure Laterality Date   • BLADDER REPAIR     • COLONOSCOPY     • ELBOW PROCEDURE Left     MRSA WOUND, COMPLETLY RESOLVED   • EXCISION LESION Right     MAXILARY GLAND X2   • FOOT SURGERY Bilateral     TARSAL TUNNEL SURGERY   • GALLBLADDER SURGERY     • HYSTERECTOMY     • RECTAL PROLAPSE REPAIR      2019 IN [place]   • RECTOPEXY N/A 10/14/2022    Procedure: ROBOT ASSISTED  RECTOPEXY;  Surgeon: Navjot Diaz MD;  Location: [facility] OR [facility];  Service: Robotics - [facility];  Laterality: N/A;   • SHOULDER SURGERY Right     RCR X2       Family History   Problem Relation Age of Onset   • Stroke Sister    • Hypertension Neg Hx    • Diabetes Neg Hx    • Cancer Neg Hx    • Malig Hyperthermia Neg Hx        Social History     Tobacco Use   • Smoking status: Never   • Smokeless tobacco: Never   Vaping Use   • Vaping Use: Never used   Substance Use Topics   • Alcohol use: Not Currently   •  Drug use: Never       Review of Systems  All systems were reviewed and negative except for:   Review of Systems   Constitutional: Negative for chills, fever and unexpected weight loss.   HENT: Negative for congestion, nosebleeds and voice change.    Eyes: Negative for blurred vision, double vision and discharge.   Respiratory: Negative for apnea, chest tightness and shortness of breath.    Cardiovascular: Negative for chest pain and leg swelling.   Gastrointestinal:        See HPI   Endocrine: Negative for cold intolerance and heat intolerance.   Genitourinary: Negative for dysuria, hematuria and urgency.   Musculoskeletal: Negative for back pain, joint swelling and neck pain.   Skin: Negative for color change and dry skin.   Neurological: Negative for dizziness and confusion.   Hematological: Negative for adenopathy.   Psychiatric/Behavioral: Negative for agitation and behavioral problems.     MEDS:  Prior to Admission medications    Medication Sig Start Date End Date Taking? Authorizing Provider   albuterol sulfate  (90 Base) MCG/ACT inhaler Inhale 2 puffs Every 6 (Six) Hours As Needed for Shortness of Air. 8/25/22  Yes Bahman Douglass MD   amitriptyline (ELAVIL) 50 MG tablet Take 50 mg by mouth Every Night. 9/2/20  Yes Kevin Wiggins MD   buPROPion XL (WELLBUTRIN XL) 300 MG 24 hr tablet Take 300 mg by mouth Every Morning. 9/2/20  Yes Kevin Wiggins MD   carvedilol (COREG) 3.125 MG tablet Take 3.125 mg by mouth 2 (Two) Times a Day With Meals. INST PER ANESTHESIA PROTOCOL 9/2/20  Yes Kevin Wiggins MD   Magnesium 500 MG capsule Take 1 capsule by mouth Daily. LAST DOSE 9/15/22   Yes Kevin Wiggins MD   MULTIPLE VITAMINS-MINERALS PO Take  by mouth. LAST DOSE 9/15/22   Yes Kevin Wiggins MD   Omega-3 Fatty Acids (fish oil) 1000 MG capsule capsule Take 1,000 mg by mouth Daily With Breakfast. LAST DOSE 9/15/22   Yes Kevin Wiggins MD   oxyCODONE-acetaminophen  "(PERCOCET)  MG per tablet Take 1 tablet by mouth Every 12 (Twelve) Hours. RX'D BY PAIN CLINIC 7/18/22  Yes ProviderKevin MD   Plecanatide (Trulance) 3 MG tablet Take 1 tablet by mouth Every Night.   Yes Emergency, Nurse Epic, RN   polyethylene glycol (MIRALAX) 17 g packet Take 17 g by mouth Daily. 10/14/22  Yes Navjot Diaz MD   Potassium 99 MG tablet Take 1 tablet by mouth Daily.   Yes ProviderKevin MD   rOPINIRole (REQUIP) 3 MG tablet Take 3 mg by mouth Every Night. 5/15/22  Yes ProviderKevin MD   simvastatin (ZOCOR) 40 MG tablet Take 40 mg by mouth Every Night. 8/21/21  Yes Emergency, Nurse Leonard, RN   Synthroid 25 MCG tablet Take 25 mcg by mouth Every Morning. 7/16/22  Yes ProviderKevin MD   oxyCODONE-acetaminophen (Percocet) 5-325 MG per tablet Take 1 tablet by mouth Every 6 (Six) Hours As Needed for Moderate Pain. 10/14/22   Navjot Diaz MD        Allergies:  Patient has no known allergies.    Objective     Vital Signs   Resp:  [16] 16    Physical Exam: Incisions without evidence of infection abdomen soft nontender, no hernia        Results Review:   {Results Review:28495::\"I reviewed the patient's new clinical results.\"    LABS/IMAGING:  Results for orders placed or performed in visit on 09/13/22   ECG 12 Lead   Result Value Ref Range    QT Interval 370 ms        Result Review :     Assessment & Plan     Status post robotic rectopexy for rectal prolapse 10/14/2022    Avoid straining.  Avoid constipation.  Follow-up with me as needed.  All questions answered.  She agrees with the plan.  Thank for the consult           This document has been electronically signed by Navjot Diaz MD  October 28, 2022 10:27 EDT  "

## 2022-11-03 ENCOUNTER — TELEPHONE (OUTPATIENT)
Dept: ORTHOPEDICS | Facility: OTHER | Age: 82
End: 2022-11-03

## 2022-11-03 NOTE — TELEPHONE ENCOUNTER
Caller: FRANK COLBY    Relationship to patient: SELF    Best call back number: [phone]    Chief complaint: BILATERAL SHOULDERS INJECTION     Type of visit: BILATERAL SHOULDERS INJECTION    Requested date: NA     If rescheduling, when is the original appointment: NA     Additional notes: NA

## 2022-11-04 ENCOUNTER — OFFICE VISIT (OUTPATIENT)
Dept: ORTHOPEDIC SURGERY | Facility: CLINIC | Age: 82
End: 2022-11-04

## 2022-11-04 VITALS — HEIGHT: 64 IN | WEIGHT: 206 LBS | BODY MASS INDEX: 35.17 KG/M2

## 2022-11-04 DIAGNOSIS — M25.512 BILATERAL SHOULDER PAIN, UNSPECIFIED CHRONICITY: ICD-10-CM

## 2022-11-04 DIAGNOSIS — M19.011 OSTEOARTHRITIS OF BOTH SHOULDERS, UNSPECIFIED OSTEOARTHRITIS TYPE: Primary | ICD-10-CM

## 2022-11-04 DIAGNOSIS — M19.012 OSTEOARTHRITIS OF BOTH SHOULDERS, UNSPECIFIED OSTEOARTHRITIS TYPE: Primary | ICD-10-CM

## 2022-11-04 DIAGNOSIS — M25.511 BILATERAL SHOULDER PAIN, UNSPECIFIED CHRONICITY: ICD-10-CM

## 2022-11-04 PROCEDURE — 20610 DRAIN/INJ JOINT/BURSA W/O US: CPT | Performed by: ORTHOPAEDIC SURGERY

## 2022-11-04 RX ADMIN — LIDOCAINE HYDROCHLORIDE 5 ML: 10 INJECTION, SOLUTION INFILTRATION; PERINEURAL at 09:24

## 2022-11-04 RX ADMIN — TRIAMCINOLONE ACETONIDE 40 MG: 40 INJECTION, SUSPENSION INTRA-ARTICULAR; INTRAMUSCULAR at 09:24

## 2022-11-04 RX ADMIN — LIDOCAINE HYDROCHLORIDE 5 ML: 10 INJECTION, SOLUTION INFILTRATION; PERINEURAL at 09:22

## 2022-11-04 RX ADMIN — TRIAMCINOLONE ACETONIDE 40 MG: 40 INJECTION, SUSPENSION INTRA-ARTICULAR; INTRAMUSCULAR at 09:22

## 2022-11-04 NOTE — PROGRESS NOTES
"Chief Complaint  Follow-up of the Right Shoulder and Follow-up of the Left Shoulder     Subjective      Chula Adkins presents to [facility] for follow-up for bilateral shoulders.  Patient has a history of bilateral shoulder osteoarthritis. She reports left shoulder is worse than the right. She states pain is very severe. She had injections in the past that has given her relief. She has her last steroid shots to bilateral shoulders on 8/2/22.     No Known Allergies     Social History     Socioeconomic History   • Marital status:    Tobacco Use   • Smoking status: Never   • Smokeless tobacco: Never   Vaping Use   • Vaping Use: Never used   Substance and Sexual Activity   • Alcohol use: Not Currently   • Drug use: Never   • Sexual activity: Defer        Review of Systems     Objective   Vital Signs:   Ht 162.6 cm (64\")   Wt 93.4 kg (206 lb)   BMI 35.36 kg/m²       Physical Exam  Constitutional:       Appearance: Normal appearance. Patient is well-developed and normal weight.   HENT:      Head: Normocephalic.      Right Ear: Hearing and external ear normal.      Left Ear: Hearing and external ear normal.      Nose: Nose normal.   Eyes:      Conjunctiva/sclera: Conjunctivae normal.   Cardiovascular:      Rate and Rhythm: Normal rate.   Pulmonary:      Effort: Pulmonary effort is normal.      Breath sounds: No wheezing or rales.   Abdominal:      Palpations: Abdomen is soft.      Tenderness: There is no abdominal tenderness.   Musculoskeletal:      Cervical back: Normal range of motion.   Skin:     Findings: No rash.   Neurological:      Mental Status: Patient is alert and oriented to person, place, and time.   Psychiatric:         Mood and Affect: Mood and affect normal.         Judgment: Judgment normal.       Ortho Exam      BILATERAL SHOULDERS: Good tone of deltoid, biceps, triceps, wrist extensors, and wrist flexors.  Sensation grossly intact. Neurovascular intact.  Radial " pulse 2+, ulnar pulse 2+. No swelling, skin discoloration or atrophy. Tender subacromial bursa. Tender greater tuberosity. crepitus with motion of left shoulder. Full elbow flexion and extension. Forward elevation to 120 left. 100 Degrees right forward elevation. Pain with shoulder ROM.        Large Joint Arthrocentesis  Date/Time: 11/4/2022 9:22 AM  Consent given by: patient  Site marked: site marked  Timeout: Immediately prior to procedure a time out was called to verify the correct patient, procedure, equipment, support staff and site/side marked as required   Supporting Documentation  Indications: pain   Procedure Details  Location: shoulder (right shoulder) -   Needle gauge: 21g.  Medications administered: 5 mL lidocaine 1 %; 40 mg triamcinolone acetonide 40 MG/ML  Patient tolerance: patient tolerated the procedure well with no immediate complications    Large Joint Arthrocentesis  Date/Time: 11/4/2022 9:24 AM  Consent given by: patient  Site marked: site marked  Timeout: Immediately prior to procedure a time out was called to verify the correct patient, procedure, equipment, support staff and site/side marked as required   Supporting Documentation  Indications: pain   Procedure Details  Location: shoulder (left shoulder) -   Needle gauge: 21g.  Medications administered: 40 mg triamcinolone acetonide 40 MG/ML; 5 mL lidocaine 1 %              Imaging Results (Most Recent)     Procedure Component Value Units Date/Time    XR Scapula Left [[id number]] Resulted: 11/04/22 0838     Updated: 11/04/22 0842    XR Scapula Right [[id number]] Resulted: 11/04/22 0838     Updated: 11/04/22 0842           Result Review :       X-Ray Report:  Right scapula(s) X-Ray  Indication: Evaluation of right scapula  AP/Lateral view(s)  Findings: No fracture osseous abnormalities. Mild arthritis to the shoulder.   Prior studies available for comparison: No     X-Ray Report:  Left scapula(s) X-Ray  Indication: Evaluation of left  shoulder  AP/Lateral view(s)  Findings: No fracture osseous abnormalities. Mild arthritis to the shoulder.   Prior studies available for comparison: No             Assessment and Plan     Diagnoses and all orders for this visit:    1. Osteoarthritis of both shoulders, unspecified osteoarthritis type (Primary)  -     Large Joint Arthrocentesis  -     Large Joint Arthrocentesis    2. Bilateral shoulder pain, unspecified chronicity  -     XR Scapula Left  -     XR Scapula Right        Bilateral shoulder injections given, she tolerated this well.     Call or return if worsening symptoms.    Follow Up     PRN    Patient was given instructions and counseling regarding her condition or for health maintenance advice. Please see specific information pulled into the AVS if appropriate.     Scribed for Jose Richter MD by Ana Flowers MA.  11/04/22   08:27 EDT    I have personally performed the services described in this document as scribed by the above individual and it is both accurate and complete. Jose Richter MD 11/05/22

## 2022-11-05 RX ORDER — TRIAMCINOLONE ACETONIDE 40 MG/ML
40 INJECTION, SUSPENSION INTRA-ARTICULAR; INTRAMUSCULAR
Status: COMPLETED | OUTPATIENT
Start: 2022-11-04 | End: 2022-11-04

## 2022-11-05 RX ORDER — LIDOCAINE HYDROCHLORIDE 10 MG/ML
5 INJECTION, SOLUTION INFILTRATION; PERINEURAL
Status: COMPLETED | OUTPATIENT
Start: 2022-11-04 | End: 2022-11-04

## 2022-11-14 ENCOUNTER — APPOINTMENT (OUTPATIENT)
Dept: WOMENS IMAGING | Facility: HOSPITAL | Age: 82
End: 2022-11-14

## 2022-11-14 PROCEDURE — 77063 BREAST TOMOSYNTHESIS BI: CPT | Performed by: RADIOLOGY

## 2022-11-14 PROCEDURE — 77067 SCR MAMMO BI INCL CAD: CPT | Performed by: RADIOLOGY

## 2023-03-15 ENCOUNTER — TELEPHONE (OUTPATIENT)
Dept: ORTHOPEDIC SURGERY | Facility: CLINIC | Age: 83
End: 2023-03-15
Payer: MEDICARE

## 2023-03-15 NOTE — TELEPHONE ENCOUNTER
Caller: FRANK    Relationship to patient: SELF    Best call back number: [phone]    Chief complaint: BILATERAL SHOULDER PAIN    Type of visit: INJECTION    Requested date: April 4TH MORNING

## 2023-04-04 ENCOUNTER — OFFICE VISIT (OUTPATIENT)
Dept: ORTHOPEDIC SURGERY | Facility: CLINIC | Age: 83
End: 2023-04-04
Payer: MEDICARE

## 2023-04-04 VITALS — BODY MASS INDEX: 34.49 KG/M2 | HEIGHT: 64 IN | HEART RATE: 86 BPM | WEIGHT: 202 LBS | OXYGEN SATURATION: 92 %

## 2023-04-04 DIAGNOSIS — M19.011 OSTEOARTHRITIS OF BOTH SHOULDERS, UNSPECIFIED OSTEOARTHRITIS TYPE: Primary | ICD-10-CM

## 2023-04-04 DIAGNOSIS — M19.012 OSTEOARTHRITIS OF BOTH SHOULDERS, UNSPECIFIED OSTEOARTHRITIS TYPE: Primary | ICD-10-CM

## 2023-04-04 PROCEDURE — 20610 DRAIN/INJ JOINT/BURSA W/O US: CPT | Performed by: ORTHOPAEDIC SURGERY

## 2023-04-04 RX ORDER — LIDOCAINE HYDROCHLORIDE 10 MG/ML
5 INJECTION, SOLUTION EPIDURAL; INFILTRATION; INTRACAUDAL; PERINEURAL
Status: COMPLETED | OUTPATIENT
Start: 2023-04-04 | End: 2023-04-04

## 2023-04-04 RX ORDER — BUMETANIDE 1 MG/1
TABLET ORAL
COMMUNITY
Start: 2023-04-03

## 2023-04-04 RX ORDER — METHYLPREDNISOLONE ACETATE 80 MG/ML
80 INJECTION, SUSPENSION INTRA-ARTICULAR; INTRALESIONAL; INTRAMUSCULAR; SOFT TISSUE
Status: COMPLETED | OUTPATIENT
Start: 2023-04-04 | End: 2023-04-04

## 2023-04-04 RX ADMIN — LIDOCAINE HYDROCHLORIDE 5 ML: 10 INJECTION, SOLUTION EPIDURAL; INFILTRATION; INTRACAUDAL; PERINEURAL at 08:25

## 2023-04-04 RX ADMIN — LIDOCAINE HYDROCHLORIDE 5 ML: 10 INJECTION, SOLUTION EPIDURAL; INFILTRATION; INTRACAUDAL; PERINEURAL at 08:24

## 2023-04-04 RX ADMIN — METHYLPREDNISOLONE ACETATE 80 MG: 80 INJECTION, SUSPENSION INTRA-ARTICULAR; INTRALESIONAL; INTRAMUSCULAR; SOFT TISSUE at 08:25

## 2023-04-04 RX ADMIN — METHYLPREDNISOLONE ACETATE 80 MG: 80 INJECTION, SUSPENSION INTRA-ARTICULAR; INTRALESIONAL; INTRAMUSCULAR; SOFT TISSUE at 08:24

## 2023-04-04 NOTE — PROGRESS NOTES
"Chief Complaint  Follow-up and Pain of the Right Shoulder and Follow-up and Pain of the Left Shoulder     Subjective      Chula Adkins presents to [facility] for follow up of bilateral shoulder.  She has had osteo arthritis for years.  Her right shoulder is worse then the left today. She has treated her shoulders conservatively.  She had injections 11/4/22.  She is here today for repeat steroid injections.     No Known Allergies     Social History     Socioeconomic History   • Marital status:    Tobacco Use   • Smoking status: Never   • Smokeless tobacco: Never   Vaping Use   • Vaping Use: Never used   Substance and Sexual Activity   • Alcohol use: Not Currently   • Drug use: Never   • Sexual activity: Defer        Review of Systems     Objective   Vital Signs:   Pulse 86   Ht 162.6 cm (64\")   Wt 91.6 kg (202 lb)   SpO2 92%   BMI 34.67 kg/m²       Physical Exam  Constitutional:       Appearance: Normal appearance. Patient is well-developed and normal weight.   HENT:      Head: Normocephalic.      Right Ear: Hearing and external ear normal.      Left Ear: Hearing and external ear normal.      Nose: Nose normal.   Eyes:      Conjunctiva/sclera: Conjunctivae normal.   Cardiovascular:      Rate and Rhythm: Normal rate.   Pulmonary:      Effort: Pulmonary effort is normal.      Breath sounds: No wheezing or rales.   Abdominal:      Palpations: Abdomen is soft.      Tenderness: There is no abdominal tenderness.   Musculoskeletal:      Cervical back: Normal range of motion.   Skin:     Findings: No rash.   Neurological:      Mental Status: Patient is alert and oriented to person, place, and time.   Psychiatric:         Mood and Affect: Mood and affect normal.         Judgment: Judgment normal.       Ortho Exam      BILATERAL SHOULDERS Forward flexion 120. Abduction 80. External rotation 40. Internal rotation to back pocket. Positive Cross body adduction. Supraspinatus strength " 3/5. Infraspinatus Strength 3/5. Infrared subscap 3/5. Positive Martin. Positive Neer. Negative Apprehension. Negative Lift off. (Negative Obriens. Sensation intact to light touch, median, radial, ulnar nerve. Positive AIN, PIN, ulnar nerve motor. Positive pulses. Positive Impingement signs. Good strength in triceps, biceps, deltoid, wrist extensors and wrist flexors.         Large Joint LEFT SHOULDER  Date/Time: 4/4/2023 8:24 AM  Consent given by: patient  Site marked: site marked  Timeout: Immediately prior to procedure a time out was called to verify the correct patient, procedure, equipment, support staff and site/side marked as required   Supporting Documentation  Indications: pain   Procedure Details  Location: shoulder (LEFT ) -   Preparation: Patient was prepped and draped in the usual sterile fashion  Needle gauge: 21G.  Medications administered: 5 mL lidocaine PF 1% 1 %; 80 mg methylPREDNISolone acetate 80 MG/ML  Patient tolerance: patient tolerated the procedure well with no immediate complications    Large Joint RIGHT SHOULDER  Date/Time: 4/4/2023 8:25 AM  Consent given by: patient  Site marked: site marked  Timeout: Immediately prior to procedure a time out was called to verify the correct patient, procedure, equipment, support staff and site/side marked as required   Supporting Documentation  Indications: pain   Procedure Details  Location: shoulder (RIGHT) -   Preparation: Patient was prepped and draped in the usual sterile fashion  Needle gauge: 21G.  Medications administered: 5 mL lidocaine PF 1% 1 %; 80 mg methylPREDNISolone acetate 80 MG/ML  Patient tolerance: patient tolerated the procedure well with no immediate complications            Imaging Results (Most Recent)     None           Result Review :           Assessment and Plan     Diagnoses and all orders for this visit:    1. Osteoarthritis of both shoulders, unspecified osteoarthritis type (Primary)        Discussed the treatment plan with  the patient. Discussed the risks and benefits of conservative measures.  The patient expressed understanding and wished to proceed with a left shoulder steroid injection.  She tolerated the injections well. Prescribed physical therapy.     Call or return if worsening symptoms.    Follow Up     PRN      Patient was given instructions and counseling regarding her condition or for health maintenance advice. Please see specific information pulled into the AVS if appropriate.     Scribed for Jose Richter MD by Ana Flowers MA.  04/04/23   08:07 EDT    I have personally performed the services described in this document as scribed by the above individual and it is both accurate and complete. Jose Richter MD 04/04/23

## 2023-04-07 ENCOUNTER — TELEPHONE (OUTPATIENT)
Dept: ORTHOPEDIC SURGERY | Facility: CLINIC | Age: 83
End: 2023-04-07
Payer: MEDICARE

## 2023-04-07 NOTE — TELEPHONE ENCOUNTER
Caller: FRANK COLBY    Relationship: SELF    Best call back number: [phone]    What orders are you requesting (i.e. lab or imaging): PT    In what timeframe would the patient need to come in: ASAP    Where will you receive your lab/imaging services[id number] [address] [facility] [place]    Additional notes: PLEASE FAX ORDERS .[phone]- AND CALL PATIENT ONCE ORDERS ARE SENT

## 2023-06-08 ENCOUNTER — OFFICE VISIT (OUTPATIENT)
Dept: ORTHOPEDIC SURGERY | Facility: CLINIC | Age: 83
End: 2023-06-08
Payer: MEDICARE

## 2023-06-08 VITALS
BODY MASS INDEX: 34.49 KG/M2 | SYSTOLIC BLOOD PRESSURE: 137 MMHG | WEIGHT: 202 LBS | HEIGHT: 64 IN | DIASTOLIC BLOOD PRESSURE: 92 MMHG | OXYGEN SATURATION: 91 % | HEART RATE: 83 BPM

## 2023-06-08 DIAGNOSIS — M19.012 OSTEOARTHRITIS OF BOTH SHOULDERS, UNSPECIFIED OSTEOARTHRITIS TYPE: Primary | ICD-10-CM

## 2023-06-08 DIAGNOSIS — M19.011 OSTEOARTHRITIS OF BOTH SHOULDERS, UNSPECIFIED OSTEOARTHRITIS TYPE: Primary | ICD-10-CM

## 2023-06-08 DIAGNOSIS — M25.511 RIGHT SHOULDER PAIN, UNSPECIFIED CHRONICITY: ICD-10-CM

## 2023-06-08 NOTE — PROGRESS NOTES
"Chief Complaint  Follow-up of the Right Shoulder and Follow-up of the Left Shoulder     Subjective      Chula Adkins presents to [facility] for follow up of bilateral shoulders.  She has been in physical therapy in bilateral shoulder.  She is having difficulty with ADL's.  She has had osteo arthritis for years.  Her last injections was on 4/4/23.  She states the injections helped on the left side but the right side is still having difficulty with forward elevation and reaching tasks.  Her pain is down the upper arm mid bicep.     No Known Allergies     Social History     Socioeconomic History    Marital status:    Tobacco Use    Smoking status: Never    Smokeless tobacco: Never   Vaping Use    Vaping Use: Never used   Substance and Sexual Activity    Alcohol use: Not Currently    Drug use: Never    Sexual activity: Defer        Review of Systems     Objective   Vital Signs:   /92 (BP Location: Right arm, Patient Position: Sitting, Cuff Size: Large Adult)   Pulse 83   Ht 162.6 cm (64\")   Wt 91.6 kg (202 lb)   SpO2 91%   BMI 34.67 kg/m²       Physical Exam  Constitutional:       Appearance: Normal appearance. Patient is well-developed and normal weight.   HENT:      Head: Normocephalic.      Right Ear: Hearing and external ear normal.      Left Ear: Hearing and external ear normal.      Nose: Nose normal.   Eyes:      Conjunctiva/sclera: Conjunctivae normal.   Cardiovascular:      Rate and Rhythm: Normal rate.   Pulmonary:      Effort: Pulmonary effort is normal.      Breath sounds: No wheezing or rales.   Abdominal:      Palpations: Abdomen is soft.      Tenderness: There is no abdominal tenderness.   Musculoskeletal:      Cervical back: Normal range of motion.   Skin:     Findings: No rash.   Neurological:      Mental Status: Patient is alert and oriented to person, place, and time.   Psychiatric:         Mood and Affect: Mood and affect normal.         Judgment: " Judgment normal.       Ortho Exam      BILATERAL SHOULDERS Forward flexion 110. Abduction 80. External rotation 40. Internal rotation to back pocket. Positive Cross body adduction. Supraspinatus strength 3/5. Infraspinatus Strength 3/5. Infrared subscap 3/5. Positive Martin. Positive Neer. Negative Apprehension. Negative Lift off. (Negative Obriens. Sensation intact to light touch, median, radial, ulnar nerve. Positive AIN, PIN, ulnar nerve motor. Positive pulses. Positive Impingement signs. Good strength in triceps, biceps, deltoid, wrist extensors and wrist flexors.       Procedures      Imaging Results (Most Recent)       None             Result Review :           Assessment and Plan     Diagnoses and all orders for this visit:    1. Osteoarthritis of both shoulders, unspecified osteoarthritis type (Primary)        Discussed the treatment plan with the patient.     MRI of the right shoulder to assess possible tears.     Call or return if worsening symptoms.    Follow Up     After MRI of the right shoulder      Patient was given instructions and counseling regarding her condition or for health maintenance advice. Please see specific information pulled into the AVS if appropriate.     Scribed for Jose Richter MD by Ana Flowers MA.  06/08/23   10:26 EDT    I have personally performed the services described in this document as scribed by the above individual and it is both accurate and complete. Jose Richter MD 06/08/23

## 2023-06-14 DIAGNOSIS — M19.011 OSTEOARTHRITIS OF BOTH SHOULDERS, UNSPECIFIED OSTEOARTHRITIS TYPE: ICD-10-CM

## 2023-06-14 DIAGNOSIS — M25.511 RIGHT SHOULDER PAIN, UNSPECIFIED CHRONICITY: ICD-10-CM

## 2023-06-14 DIAGNOSIS — M19.012 OSTEOARTHRITIS OF BOTH SHOULDERS, UNSPECIFIED OSTEOARTHRITIS TYPE: ICD-10-CM

## 2023-08-31 ENCOUNTER — OFFICE VISIT (OUTPATIENT)
Dept: ORTHOPEDIC SURGERY | Facility: CLINIC | Age: 83
End: 2023-08-31
Payer: MEDICARE

## 2023-08-31 VITALS
WEIGHT: 205 LBS | HEART RATE: 89 BPM | OXYGEN SATURATION: 92 % | HEIGHT: 64 IN | BODY MASS INDEX: 35 KG/M2 | SYSTOLIC BLOOD PRESSURE: 130 MMHG | DIASTOLIC BLOOD PRESSURE: 87 MMHG

## 2023-08-31 DIAGNOSIS — M25.562 LEFT KNEE PAIN, UNSPECIFIED CHRONICITY: Primary | ICD-10-CM

## 2023-08-31 DIAGNOSIS — M17.12 OSTEOARTHRITIS OF LEFT KNEE, UNSPECIFIED OSTEOARTHRITIS TYPE: ICD-10-CM

## 2023-08-31 RX ORDER — TRIAMCINOLONE ACETONIDE 40 MG/ML
40 INJECTION, SUSPENSION INTRA-ARTICULAR; INTRAMUSCULAR
Status: COMPLETED | OUTPATIENT
Start: 2023-08-31 | End: 2023-08-31

## 2023-08-31 RX ORDER — BUPIVACAINE HYDROCHLORIDE 5 MG/ML
5 INJECTION, SOLUTION EPIDURAL; INTRACAUDAL
Status: COMPLETED | OUTPATIENT
Start: 2023-08-31 | End: 2023-08-31

## 2023-08-31 RX ADMIN — TRIAMCINOLONE ACETONIDE 40 MG: 40 INJECTION, SUSPENSION INTRA-ARTICULAR; INTRAMUSCULAR at 09:22

## 2023-08-31 RX ADMIN — BUPIVACAINE HYDROCHLORIDE 5 ML: 5 INJECTION, SOLUTION EPIDURAL; INTRACAUDAL at 09:22

## 2023-08-31 NOTE — PROGRESS NOTES
"Chief Complaint  Pain and Initial Evaluation of the Left Knee     Subjective      Chula Adkins presents to [facility] for initial evaluation of the left knee. He has had pain in her left knee for years.  She has had injections, medication and therapy in the past.  She has pain in the back of the knee.  She has difficulty with prolonged standing, ambulation and stairs.    She is having pain in the right shoulder. She had a MRI in the past that stated she has arthritis.  She has difficulty with cleaning.     No Known Allergies     Social History     Socioeconomic History    Marital status:    Tobacco Use    Smoking status: Never    Smokeless tobacco: Never   Vaping Use    Vaping Use: Never used   Substance and Sexual Activity    Alcohol use: Not Currently    Drug use: Never    Sexual activity: Defer        I reviewed the patient's chief complaint, history of present illness, review of systems, past medical history, surgical history, family history, social history, medications, and allergy list.     Review of Systems     Constitutional: Denies fevers, chills, weight loss  Cardiovascular: Denies chest pain, shortness of breath  Skin: Denies rashes, acute skin changes  Neurologic: Denies headache, loss of consciousness        Vital Signs:   /87   Pulse 89   Ht 162.6 cm (64\")   Wt 93 kg (205 lb)   SpO2 92%   BMI 35.19 kg/mý          Physical Exam  General: Alert. No acute distress    Ortho Exam        LEFT KNEE Flexion 110. Extension 0. Stable to varus/valgus stress. Stable to anterior/posterior drawer. Neurovascularly intact. Negative Matty. Negative Lachman. Positive EHL, FHL, HS and TA. Sensation intact to light touch all 5 nerves of the foot. Ambulates with Antalgic gait. Patella is well tracking. Calf supple, non-tender. Positive tenderness to the medial joint line. Positive tenderness to the lateral joint line. Positive Crepitus. Good strength to hamstrings, " quadriceps, dorsiflexors, and plantar flexors.  Knee Extensor Mechanism intact        Large Joint Arthrocentesis: L knee  Date/Time: 8/31/2023 9:22 AM  Consent given by: patient  Site marked: site marked  Timeout: Immediately prior to procedure a time out was called to verify the correct patient, procedure, equipment, support staff and site/side marked as required   Supporting Documentation  Indications: pain   Procedure Details  Location: knee - L knee  Needle gauge: 21g.  Medications administered: 5 mL bupivacaine (PF) 0.5 %; 40 mg triamcinolone acetonide 40 MG/ML  Patient tolerance: patient tolerated the procedure well with no immediate complications        Imaging Results (Most Recent)       Procedure Component Value Units Date/Time    XR Knee 3 View Left [[id number]] Resulted: 08/31/23 0856     Updated: 08/31/23 0858             Result Review :     X-Ray Report:  Left knee X-Ray  Indication: Evaluation of the left knee  AP/Lateral and Wellman view(s)  Findings: Moderately advanced arthritis.   Prior studies available for comparison: no            Assessment and Plan     Diagnoses and all orders for this visit:    1. Left knee pain, unspecified chronicity (Primary)  -     XR Knee 3 View Left    2. Osteoarthritis of left knee, unspecified osteoarthritis type        Discussed the treatment plan with the patient. I reviewed the X-rays that were obtained today with the patient.     Discussed the risks and benefits of conservative measures.  The patient expressed understanding and wished to proceed with a left knee steroid injection.  She tolerated the injection well.       Call or return if worsening symptoms.    Follow Up     PRN      Patient was given instructions and counseling regarding her condition or for health maintenance advice. Please see specific information pulled into the AVS if appropriate.     Scribed for Jose Richter MD by Ana Flowers MA.  08/31/23   08:58 EDT    I have personally performed  the services described in this document as scribed by the above individual and it is both accurate and complete. Jose Richter MD 08/31/23

## 2023-10-16 ENCOUNTER — APPOINTMENT (OUTPATIENT)
Dept: CARDIOLOGY | Facility: HOSPITAL | Age: 83
End: 2023-10-16
Payer: MEDICARE

## 2023-10-16 ENCOUNTER — APPOINTMENT (OUTPATIENT)
Dept: NEUROLOGY | Facility: HOSPITAL | Age: 83
End: 2023-10-16
Payer: MEDICARE

## 2023-10-16 ENCOUNTER — HOSPITAL ENCOUNTER (INPATIENT)
Facility: HOSPITAL | Age: 83
LOS: 9 days | Discharge: HOME HEALTH | End: 2023-10-25
Attending: EMERGENCY MEDICINE | Admitting: HOSPITALIST
Payer: MEDICARE

## 2023-10-16 ENCOUNTER — APPOINTMENT (OUTPATIENT)
Dept: CT IMAGING | Facility: HOSPITAL | Age: 83
End: 2023-10-16
Payer: MEDICARE

## 2023-10-16 ENCOUNTER — APPOINTMENT (OUTPATIENT)
Dept: MRI IMAGING | Facility: HOSPITAL | Age: 83
End: 2023-10-16
Payer: MEDICARE

## 2023-10-16 ENCOUNTER — APPOINTMENT (OUTPATIENT)
Dept: GENERAL RADIOLOGY | Facility: HOSPITAL | Age: 83
End: 2023-10-16
Payer: MEDICARE

## 2023-10-16 DIAGNOSIS — R41.82 ALTERED MENTAL STATUS, UNSPECIFIED ALTERED MENTAL STATUS TYPE: Primary | ICD-10-CM

## 2023-10-16 DIAGNOSIS — R26.2 DIFFICULTY WALKING: ICD-10-CM

## 2023-10-16 PROBLEM — J96.02 ACUTE RESPIRATORY FAILURE WITH HYPOXIA AND HYPERCAPNIA: Status: ACTIVE | Noted: 2023-10-16

## 2023-10-16 PROBLEM — E87.20 LACTIC ACIDOSIS: Status: ACTIVE | Noted: 2023-10-16

## 2023-10-16 PROBLEM — R40.2430 GLASGOW COMA SCALE TOTAL SCORE 3-8: Status: ACTIVE | Noted: 2023-10-16

## 2023-10-16 PROBLEM — J96.01 ACUTE RESPIRATORY FAILURE WITH HYPOXIA: Status: ACTIVE | Noted: 2023-10-16

## 2023-10-16 PROBLEM — J96.01 ACUTE RESPIRATORY FAILURE WITH HYPOXIA AND HYPERCAPNIA: Status: ACTIVE | Noted: 2023-10-16

## 2023-10-16 LAB
ACB CMPLX DNA BAL NAA+NON-PRB-NCNCRNG: NOT DETECTED
ALBUMIN SERPL-MCNC: 3.1 G/DL (ref 3.5–5.2)
ALBUMIN/GLOB SERPL: 1.8 G/DL
ALP SERPL-CCNC: 60 U/L (ref 39–117)
ALT SERPL W P-5'-P-CCNC: 11 U/L (ref 1–33)
ANION GAP SERPL CALCULATED.3IONS-SCNC: 12.6 MMOL/L (ref 5–15)
APTT PPP: 23.1 SECONDS (ref 78–95.9)
APTT PPP: >200 SECONDS (ref 78–95.9)
ARTERIAL PATENCY WRIST A: ABNORMAL
ARTERIAL PATENCY WRIST A: POSITIVE
AST SERPL-CCNC: 10 U/L (ref 1–32)
BASE EXCESS BLDA CALC-SCNC: -5.9 MMOL/L (ref -2–2)
BASE EXCESS BLDA CALC-SCNC: -7.9 MMOL/L (ref -2–2)
BASOPHILS # BLD AUTO: 0.07 10*3/MM3 (ref 0–0.2)
BASOPHILS NFR BLD AUTO: 1.3 % (ref 0–1.5)
BDY SITE: ABNORMAL
BDY SITE: ABNORMAL
BILIRUB SERPL-MCNC: 0.2 MG/DL (ref 0–1.2)
BILIRUB UR QL STRIP: NEGATIVE
BLACTX-M ISLT/SPM QL: ABNORMAL
BLAIMP ISLT/SPM QL: ABNORMAL
BLAKPC ISLT/SPM QL: ABNORMAL
BLAOXA-48-LIKE ISLT/SPM QL: ABNORMAL
BLAVIM ISLT/SPM QL: ABNORMAL
BUN SERPL-MCNC: 54 MG/DL (ref 8–23)
BUN/CREAT SERPL: 45 (ref 7–25)
C PNEUM DNA NPH QL NAA+NON-PROBE: NOT DETECTED
CA-I BLDA-SCNC: 1.11 MMOL/L (ref 1.13–1.32)
CALCIUM SPEC-SCNC: 8 MG/DL (ref 8.6–10.5)
CHLORIDE SERPL-SCNC: 107 MMOL/L (ref 98–107)
CILIATED BAL QL: 33 %
CK SERPL-CCNC: 54 U/L (ref 20–180)
CLARITY UR: CLEAR
CO2 SERPL-SCNC: 18.4 MMOL/L (ref 22–29)
COHGB MFR BLD: 0.1 % (ref 0–1.5)
COHGB MFR BLD: 0.3 % (ref 0–1.5)
COLOR UR: YELLOW
CREAT SERPL-MCNC: 1.2 MG/DL (ref 0.57–1)
D DIMER PPP FEU-MCNC: 0.53 MCGFEU/ML (ref 0–0.81)
D-LACTATE SERPL-SCNC: 1.3 MMOL/L (ref 0.5–2)
D-LACTATE SERPL-SCNC: 1.4 MMOL/L (ref 0.5–2)
D-LACTATE SERPL-SCNC: 5.9 MMOL/L (ref 0.5–2)
DEPRECATED RDW RBC AUTO: 44.5 FL (ref 37–54)
E CLOAC COMP DNA BAL NAA+NON-PRB-NCNCRNG: NOT DETECTED
E COLI DNA BAL NAA+NON-PRB-NCNCRNG: NOT DETECTED
EGFRCR SERPLBLD CKD-EPI 2021: 45.6 ML/MIN/1.73
EOSINOPHIL # BLD AUTO: 0.19 10*3/MM3 (ref 0–0.4)
EOSINOPHIL NFR BLD AUTO: 3.5 % (ref 0.3–6.2)
ERYTHROCYTE [DISTWIDTH] IN BLOOD BY AUTOMATED COUNT: 12.4 % (ref 12.3–15.4)
FHHB: 1 % (ref 0–5)
FHHB: 8.2 % (ref 0–5)
FLUAV SUBTYP SPEC NAA+PROBE: NOT DETECTED
FLUBV RNA ISLT QL NAA+PROBE: NOT DETECTED
GEN 5 2HR TROPONIN T REFLEX: 16 NG/L
GLOBULIN UR ELPH-MCNC: 1.7 GM/DL
GLUCOSE SERPL-MCNC: 121 MG/DL (ref 65–99)
GLUCOSE UR STRIP-MCNC: NEGATIVE MG/DL
GP B STREP DNA BAL NAA+NON-PRB-NCNCRNG: NOT DETECTED
HADV DNA SPEC NAA+PROBE: NOT DETECTED
HAEM INFLU DNA BAL NAA+NON-PRB-NCNCRNG: NOT DETECTED
HCO3 BLDA-SCNC: 20.6 MMOL/L (ref 22–26)
HCO3 BLDA-SCNC: 20.6 MMOL/L (ref 22–26)
HCOV RNA LOWER RESP QL NAA+NON-PROBE: NOT DETECTED
HCT VFR BLD AUTO: 32 % (ref 34–46.6)
HGB BLD-MCNC: 9.8 G/DL (ref 12–15.9)
HGB BLDA-MCNC: 12.8 G/DL (ref 11.7–14.6)
HGB BLDA-MCNC: 13.5 G/DL (ref 11.7–14.6)
HGB UR QL STRIP.AUTO: NEGATIVE
HMPV RNA NPH QL NAA+NON-PROBE: NOT DETECTED
HOLD SPECIMEN: NORMAL
HOLD SPECIMEN: NORMAL
HPIV RNA LOWER RESP QL NAA+NON-PROBE: NOT DETECTED
IMM GRANULOCYTES # BLD AUTO: 0.01 10*3/MM3 (ref 0–0.05)
IMM GRANULOCYTES NFR BLD AUTO: 0.2 % (ref 0–0.5)
INHALED O2 CONCENTRATION: 100 %
INHALED O2 CONCENTRATION: 40 %
INR PPP: 1.02 (ref 0.86–1.15)
INR PPP: 1.25 (ref 0.86–1.15)
K AEROGENES DNA BAL NAA+NON-PRB-NCNCRNG: NOT DETECTED
K OXYTOCA DNA BAL NAA+NON-PRB-NCNCRNG: NOT DETECTED
K PNEU GRP DNA BAL NAA+NON-PRB-NCNCRNG: NOT DETECTED
KETONES UR QL STRIP: NEGATIVE
L PNEUMO DNA LOWER RESP QL NAA+NON-PROBE: NOT DETECTED
L PNEUMO1 AG UR QL IA: NEGATIVE
LACTATE BLDA-SCNC: ABNORMAL MMOL/L
LACTATE BLDA-SCNC: ABNORMAL MMOL/L
LEUKOCYTE ESTERASE UR QL STRIP.AUTO: NEGATIVE
LYMPHOCYTES # BLD AUTO: 1.13 10*3/MM3 (ref 0.7–3.1)
LYMPHOCYTES NFR BLD AUTO: 21 % (ref 19.6–45.3)
LYMPHOCYTES NFR FLD MANUAL: 33 %
M CATARRHALIS DNA BAL NAA+NON-PRB-NCNCRNG: NOT DETECTED
M PNEUMO IGG SER IA-ACNC: NOT DETECTED
MACROPHAGE FLUID: 7 %
MAGNESIUM SERPL-MCNC: 1.6 MG/DL (ref 1.6–2.4)
MAGNESIUM SERPL-MCNC: 1.8 MG/DL (ref 1.6–2.4)
MAGNESIUM SERPL-MCNC: 1.9 MG/DL (ref 1.6–2.4)
MCH RBC QN AUTO: 29.9 PG (ref 26.6–33)
MCHC RBC AUTO-ENTMCNC: 30.6 G/DL (ref 31.5–35.7)
MCV RBC AUTO: 97.6 FL (ref 79–97)
MECA+MECC ISLT/SPM QL: NOT DETECTED
METHGB BLD QL: 0.2 % (ref 0–1.5)
METHGB BLD QL: 0.2 % (ref 0–1.5)
MODALITY: ABNORMAL
MODALITY: ABNORMAL
MONOCYTES # BLD AUTO: 0.47 10*3/MM3 (ref 0.1–0.9)
MONOCYTES NFR BLD AUTO: 8.7 % (ref 5–12)
MRSA DNA SPEC QL NAA+PROBE: NORMAL
NDM GENE: ABNORMAL
NEUTROPHILS NFR BLD AUTO: 3.51 10*3/MM3 (ref 1.7–7)
NEUTROPHILS NFR BLD AUTO: 65.3 % (ref 42.7–76)
NEUTROPHILS NFR FLD MANUAL: 27 %
NITRITE UR QL STRIP: NEGATIVE
NOTE: ABNORMAL
NOTE: ABNORMAL
NRBC BLD AUTO-RTO: 0 /100 WBC (ref 0–0.2)
NT-PROBNP SERPL-MCNC: 67.3 PG/ML (ref 0–1800)
OXYHGB MFR BLDV: 91.5 % (ref 94–99)
OXYHGB MFR BLDV: 98.5 % (ref 94–99)
P AERUGINOSA DNA BAL NAA+NON-PRB-NCNCRNG: NOT DETECTED
PCO2 BLDA: 44.3 MM HG (ref 35–45)
PCO2 BLDA: 55.1 MM HG (ref 35–45)
PEEP RESPIRATORY: 10 CM[H2O]
PEEP RESPIRATORY: 5 CM[H2O]
PH BLDA: 7.19 PH UNITS (ref 7.35–7.45)
PH BLDA: 7.29 PH UNITS (ref 7.35–7.45)
PH UR STRIP.AUTO: 5.5 [PH] (ref 5–8)
PHOSPHATE SERPL-MCNC: 4.1 MG/DL (ref 2.5–4.5)
PLATELET # BLD AUTO: 154 10*3/MM3 (ref 140–450)
PMV BLD AUTO: 10.5 FL (ref 6–12)
PO2 BLD: 160 MM[HG] (ref 0–500)
PO2 BLD: 235 MM[HG] (ref 0–500)
PO2 BLDA: 235.4 MM HG (ref 80–100)
PO2 BLDA: 63.9 MM HG (ref 80–100)
POTASSIUM SERPL-SCNC: 4.8 MMOL/L (ref 3.5–5.2)
PROCALCITONIN SERPL-MCNC: <0.02 NG/ML (ref 0–0.25)
PROT SERPL-MCNC: 4.8 G/DL (ref 6–8.5)
PROT UR QL STRIP: NEGATIVE
PROTEUS SP DNA BAL NAA+NON-PRB-NCNCRNG: NOT DETECTED
PROTHROMBIN TIME: 13.5 SECONDS (ref 11.8–14.9)
PROTHROMBIN TIME: 15.8 SECONDS (ref 11.8–14.9)
RBC # BLD AUTO: 3.28 10*6/MM3 (ref 3.77–5.28)
RHINOVIRUS RNA SPEC NAA+PROBE: NOT DETECTED
RSV RNA NPH QL NAA+NON-PROBE: NOT DETECTED
S AUREUS DNA BAL NAA+NON-PRB-NCNCRNG: DETECTED
S MARCESCENS DNA BAL NAA+NON-PRB-NCNCRNG: NOT DETECTED
S PNEUM AG SPEC QL LA: NEGATIVE
S PNEUM DNA BAL NAA+NON-PRB-NCNCRNG: NOT DETECTED
S PYO DNA BAL NAA+NON-PRB-NCNCRNG: NOT DETECTED
SAO2 % BLDCOA: 91.8 % (ref 95–99)
SAO2 % BLDCOA: 99 % (ref 95–99)
SET MECH RESP RATE: 20
SET MECH RESP RATE: 20
SODIUM SERPL-SCNC: 138 MMOL/L (ref 136–145)
SP GR UR STRIP: 1.02 (ref 1–1.03)
T4 FREE SERPL-MCNC: 0.93 NG/DL (ref 0.93–1.7)
TROPONIN T DELTA: 0 NG/L
TROPONIN T SERPL HS-MCNC: 15 NG/L
TROPONIN T SERPL HS-MCNC: 16 NG/L
TSH SERPL DL<=0.05 MIU/L-ACNC: 2.17 UIU/ML (ref 0.27–4.2)
UROBILINOGEN UR QL STRIP: NORMAL
VENTILATOR MODE: ABNORMAL
VISUAL PRESENCE OF BLOOD: NORMAL
WBC NRBC COR # BLD: 5.38 10*3/MM3 (ref 3.4–10.8)
WHOLE BLOOD HOLD COAG: NORMAL
WHOLE BLOOD HOLD SPECIMEN: NORMAL

## 2023-10-16 PROCEDURE — 71250 CT THORAX DX C-: CPT

## 2023-10-16 PROCEDURE — 82330 ASSAY OF CALCIUM: CPT | Performed by: INTERNAL MEDICINE

## 2023-10-16 PROCEDURE — 0BH17EZ INSERTION OF ENDOTRACHEAL AIRWAY INTO TRACHEA, VIA NATURAL OR ARTIFICIAL OPENING: ICD-10-PCS | Performed by: EMERGENCY MEDICINE

## 2023-10-16 PROCEDURE — 25010000002 FENTANYL CITRATE (PF) 50 MCG/ML SOLUTION: Performed by: INTERNAL MEDICINE

## 2023-10-16 PROCEDURE — 84439 ASSAY OF FREE THYROXINE: CPT | Performed by: EMERGENCY MEDICINE

## 2023-10-16 PROCEDURE — 80053 COMPREHEN METABOLIC PANEL: CPT | Performed by: EMERGENCY MEDICINE

## 2023-10-16 PROCEDURE — 85379 FIBRIN DEGRADATION QUANT: CPT | Performed by: INTERNAL MEDICINE

## 2023-10-16 PROCEDURE — 87641 MR-STAPH DNA AMP PROBE: CPT | Performed by: INTERNAL MEDICINE

## 2023-10-16 PROCEDURE — 71045 X-RAY EXAM CHEST 1 VIEW: CPT

## 2023-10-16 PROCEDURE — 85730 THROMBOPLASTIN TIME PARTIAL: CPT | Performed by: INTERNAL MEDICINE

## 2023-10-16 PROCEDURE — 84484 ASSAY OF TROPONIN QUANT: CPT | Performed by: EMERGENCY MEDICINE

## 2023-10-16 PROCEDURE — 94799 UNLISTED PULMONARY SVC/PX: CPT

## 2023-10-16 PROCEDURE — 0B9F8ZX DRAINAGE OF RIGHT LOWER LUNG LOBE, VIA NATURAL OR ARTIFICIAL OPENING ENDOSCOPIC, DIAGNOSTIC: ICD-10-PCS | Performed by: INTERNAL MEDICINE

## 2023-10-16 PROCEDURE — 36600 WITHDRAWAL OF ARTERIAL BLOOD: CPT | Performed by: EMERGENCY MEDICINE

## 2023-10-16 PROCEDURE — 25810000003 SODIUM CHLORIDE 0.9 % SOLUTION: Performed by: INTERNAL MEDICINE

## 2023-10-16 PROCEDURE — 72125 CT NECK SPINE W/O DYE: CPT

## 2023-10-16 PROCEDURE — 94002 VENT MGMT INPAT INIT DAY: CPT

## 2023-10-16 PROCEDURE — 25510000001 IOPAMIDOL PER 1 ML: Performed by: INTERNAL MEDICINE

## 2023-10-16 PROCEDURE — 87040 BLOOD CULTURE FOR BACTERIA: CPT | Performed by: EMERGENCY MEDICINE

## 2023-10-16 PROCEDURE — 25010000002 SUCCINYLCHOLINE PER 20 MG: Performed by: EMERGENCY MEDICINE

## 2023-10-16 PROCEDURE — 99291 CRITICAL CARE FIRST HOUR: CPT | Performed by: INTERNAL MEDICINE

## 2023-10-16 PROCEDURE — 87186 SC STD MICRODIL/AGAR DIL: CPT | Performed by: INTERNAL MEDICINE

## 2023-10-16 PROCEDURE — 25010000002 VANCOMYCIN 5 G RECONSTITUTED SOLUTION: Performed by: EMERGENCY MEDICINE

## 2023-10-16 PROCEDURE — 84145 PROCALCITONIN (PCT): CPT | Performed by: INTERNAL MEDICINE

## 2023-10-16 PROCEDURE — 85610 PROTHROMBIN TIME: CPT | Performed by: INTERNAL MEDICINE

## 2023-10-16 PROCEDURE — 87205 SMEAR GRAM STAIN: CPT | Performed by: EMERGENCY MEDICINE

## 2023-10-16 PROCEDURE — 25010000002 PIPERACILLIN SOD-TAZOBACTAM PER 1 G: Performed by: EMERGENCY MEDICINE

## 2023-10-16 PROCEDURE — 31645 BRNCHSC W/THER ASPIR 1ST: CPT | Performed by: INTERNAL MEDICINE

## 2023-10-16 PROCEDURE — 83880 ASSAY OF NATRIURETIC PEPTIDE: CPT | Performed by: INTERNAL MEDICINE

## 2023-10-16 PROCEDURE — 95819 EEG AWAKE AND ASLEEP: CPT

## 2023-10-16 PROCEDURE — 31624 DX BRONCHOSCOPE/LAVAGE: CPT | Performed by: INTERNAL MEDICINE

## 2023-10-16 PROCEDURE — 31500 INSERT EMERGENCY AIRWAY: CPT

## 2023-10-16 PROCEDURE — 36600 WITHDRAWAL OF ARTERIAL BLOOD: CPT | Performed by: INTERNAL MEDICINE

## 2023-10-16 PROCEDURE — 99291 CRITICAL CARE FIRST HOUR: CPT

## 2023-10-16 PROCEDURE — 25010000002 PIPERACILLIN SOD-TAZOBACTAM PER 1 G: Performed by: INTERNAL MEDICINE

## 2023-10-16 PROCEDURE — 25010000002 HEPARIN (PORCINE) 25000-0.45 UT/250ML-% SOLUTION: Performed by: INTERNAL MEDICINE

## 2023-10-16 PROCEDURE — 25010000002 PROPOFOL 10 MG/ML EMULSION: Performed by: INTERNAL MEDICINE

## 2023-10-16 PROCEDURE — 70551 MRI BRAIN STEM W/O DYE: CPT

## 2023-10-16 PROCEDURE — 81003 URINALYSIS AUTO W/O SCOPE: CPT | Performed by: EMERGENCY MEDICINE

## 2023-10-16 PROCEDURE — 84484 ASSAY OF TROPONIN QUANT: CPT | Performed by: INTERNAL MEDICINE

## 2023-10-16 PROCEDURE — 82550 ASSAY OF CK (CPK): CPT | Performed by: INTERNAL MEDICINE

## 2023-10-16 PROCEDURE — 83605 ASSAY OF LACTIC ACID: CPT | Performed by: EMERGENCY MEDICINE

## 2023-10-16 PROCEDURE — 83605 ASSAY OF LACTIC ACID: CPT | Performed by: INTERNAL MEDICINE

## 2023-10-16 PROCEDURE — 36415 COLL VENOUS BLD VENIPUNCTURE: CPT

## 2023-10-16 PROCEDURE — 85025 COMPLETE CBC W/AUTO DIFF WBC: CPT | Performed by: EMERGENCY MEDICINE

## 2023-10-16 PROCEDURE — 83050 HGB METHEMOGLOBIN QUAN: CPT | Performed by: INTERNAL MEDICINE

## 2023-10-16 PROCEDURE — 87071 CULTURE AEROBIC QUANT OTHER: CPT | Performed by: INTERNAL MEDICINE

## 2023-10-16 PROCEDURE — 70450 CT HEAD/BRAIN W/O DYE: CPT

## 2023-10-16 PROCEDURE — 83050 HGB METHEMOGLOBIN QUAN: CPT | Performed by: EMERGENCY MEDICINE

## 2023-10-16 PROCEDURE — 99285 EMERGENCY DEPT VISIT HI MDM: CPT

## 2023-10-16 PROCEDURE — 82805 BLOOD GASES W/O2 SATURATION: CPT | Performed by: INTERNAL MEDICINE

## 2023-10-16 PROCEDURE — 87205 SMEAR GRAM STAIN: CPT | Performed by: INTERNAL MEDICINE

## 2023-10-16 PROCEDURE — 84443 ASSAY THYROID STIM HORMONE: CPT | Performed by: EMERGENCY MEDICINE

## 2023-10-16 PROCEDURE — 87147 CULTURE TYPE IMMUNOLOGIC: CPT | Performed by: INTERNAL MEDICINE

## 2023-10-16 PROCEDURE — 82375 ASSAY CARBOXYHB QUANT: CPT | Performed by: EMERGENCY MEDICINE

## 2023-10-16 PROCEDURE — 82805 BLOOD GASES W/O2 SATURATION: CPT | Performed by: EMERGENCY MEDICINE

## 2023-10-16 PROCEDURE — 87633 RESP VIRUS 12-25 TARGETS: CPT | Performed by: INTERNAL MEDICINE

## 2023-10-16 PROCEDURE — 84100 ASSAY OF PHOSPHORUS: CPT | Performed by: INTERNAL MEDICINE

## 2023-10-16 PROCEDURE — 87070 CULTURE OTHR SPECIMN AEROBIC: CPT | Performed by: EMERGENCY MEDICINE

## 2023-10-16 PROCEDURE — 89051 BODY FLUID CELL COUNT: CPT | Performed by: INTERNAL MEDICINE

## 2023-10-16 PROCEDURE — 71260 CT THORAX DX C+: CPT

## 2023-10-16 PROCEDURE — 87449 NOS EACH ORGANISM AG IA: CPT | Performed by: INTERNAL MEDICINE

## 2023-10-16 PROCEDURE — 87147 CULTURE TYPE IMMUNOLOGIC: CPT | Performed by: EMERGENCY MEDICINE

## 2023-10-16 PROCEDURE — 82375 ASSAY CARBOXYHB QUANT: CPT | Performed by: INTERNAL MEDICINE

## 2023-10-16 PROCEDURE — 25810000003 SODIUM CHLORIDE 0.9 % SOLUTION: Performed by: EMERGENCY MEDICINE

## 2023-10-16 PROCEDURE — 83735 ASSAY OF MAGNESIUM: CPT | Performed by: EMERGENCY MEDICINE

## 2023-10-16 PROCEDURE — 93005 ELECTROCARDIOGRAM TRACING: CPT | Performed by: EMERGENCY MEDICINE

## 2023-10-16 PROCEDURE — 74018 RADEX ABDOMEN 1 VIEW: CPT

## 2023-10-16 PROCEDURE — 87899 AGENT NOS ASSAY W/OPTIC: CPT | Performed by: INTERNAL MEDICINE

## 2023-10-16 PROCEDURE — 83735 ASSAY OF MAGNESIUM: CPT | Performed by: INTERNAL MEDICINE

## 2023-10-16 PROCEDURE — 5A1945Z RESPIRATORY VENTILATION, 24-96 CONSECUTIVE HOURS: ICD-10-PCS | Performed by: EMERGENCY MEDICINE

## 2023-10-16 PROCEDURE — 93010 ELECTROCARDIOGRAM REPORT: CPT | Performed by: INTERNAL MEDICINE

## 2023-10-16 RX ORDER — SODIUM CHLORIDE 9 MG/ML
40 INJECTION, SOLUTION INTRAVENOUS AS NEEDED
Status: DISCONTINUED | OUTPATIENT
Start: 2023-10-16 | End: 2023-10-25 | Stop reason: HOSPADM

## 2023-10-16 RX ORDER — ENOXAPARIN SODIUM 100 MG/ML
40 INJECTION SUBCUTANEOUS DAILY
Status: CANCELLED | OUTPATIENT
Start: 2023-10-16

## 2023-10-16 RX ORDER — SODIUM CHLORIDE 0.9 % (FLUSH) 0.9 %
10 SYRINGE (ML) INJECTION AS NEEDED
Status: DISCONTINUED | OUTPATIENT
Start: 2023-10-16 | End: 2023-10-16

## 2023-10-16 RX ORDER — ETOMIDATE 2 MG/ML
10 INJECTION INTRAVENOUS ONCE
Status: COMPLETED | OUTPATIENT
Start: 2023-10-16 | End: 2023-10-16

## 2023-10-16 RX ORDER — POLYETHYLENE GLYCOL 3350 17 G/17G
17 POWDER, FOR SOLUTION ORAL DAILY PRN
Status: DISCONTINUED | OUTPATIENT
Start: 2023-10-16 | End: 2023-10-21

## 2023-10-16 RX ORDER — ALBUTEROL SULFATE 2.5 MG/3ML
2.5 SOLUTION RESPIRATORY (INHALATION) EVERY 4 HOURS PRN
Status: DISCONTINUED | OUTPATIENT
Start: 2023-10-16 | End: 2023-10-20

## 2023-10-16 RX ORDER — CHLORAL HYDRATE 500 MG
1000 CAPSULE ORAL
COMMUNITY

## 2023-10-16 RX ORDER — ACETAMINOPHEN 160 MG/5ML
650 SOLUTION ORAL EVERY 4 HOURS PRN
Status: DISCONTINUED | OUTPATIENT
Start: 2023-10-16 | End: 2023-10-21

## 2023-10-16 RX ORDER — ACETAMINOPHEN 650 MG/1
650 SUPPOSITORY RECTAL EVERY 4 HOURS PRN
Status: DISCONTINUED | OUTPATIENT
Start: 2023-10-16 | End: 2023-10-21

## 2023-10-16 RX ORDER — SODIUM CHLORIDE 9 MG/ML
100 INJECTION, SOLUTION INTRAVENOUS CONTINUOUS
Status: DISCONTINUED | OUTPATIENT
Start: 2023-10-16 | End: 2023-10-17

## 2023-10-16 RX ORDER — CARVEDILOL 3.12 MG/1
3.12 TABLET ORAL 2 TIMES DAILY WITH MEALS
COMMUNITY
Start: 2023-08-01

## 2023-10-16 RX ORDER — BUMETANIDE 1 MG/1
1 TABLET ORAL DAILY
COMMUNITY
End: 2023-10-25 | Stop reason: HOSPADM

## 2023-10-16 RX ORDER — AMITRIPTYLINE HYDROCHLORIDE 50 MG/1
50 TABLET, FILM COATED ORAL NIGHTLY
COMMUNITY
Start: 2023-08-01 | End: 2023-10-25 | Stop reason: HOSPADM

## 2023-10-16 RX ORDER — BISACODYL 5 MG/1
5 TABLET, DELAYED RELEASE ORAL DAILY PRN
Status: DISCONTINUED | OUTPATIENT
Start: 2023-10-16 | End: 2023-10-17

## 2023-10-16 RX ORDER — BUPIVACAINE HCL/0.9 % NACL/PF 0.25 %
.02-.3 PLASTIC BAG, INJECTION (ML) EPIDURAL CONTINUOUS PRN
Status: CANCELLED | OUTPATIENT
Start: 2023-10-16

## 2023-10-16 RX ORDER — FENTANYL CITRATE 50 UG/ML
100 INJECTION, SOLUTION INTRAMUSCULAR; INTRAVENOUS ONCE AS NEEDED
Status: DISCONTINUED | OUTPATIENT
Start: 2023-10-16 | End: 2023-10-17

## 2023-10-16 RX ORDER — ONDANSETRON 2 MG/ML
4 INJECTION INTRAMUSCULAR; INTRAVENOUS EVERY 6 HOURS PRN
Status: DISCONTINUED | OUTPATIENT
Start: 2023-10-16 | End: 2023-10-25 | Stop reason: HOSPADM

## 2023-10-16 RX ORDER — SODIUM CHLORIDE 0.9 % (FLUSH) 0.9 %
10 SYRINGE (ML) INJECTION AS NEEDED
Status: DISCONTINUED | OUTPATIENT
Start: 2023-10-16 | End: 2023-10-25 | Stop reason: HOSPADM

## 2023-10-16 RX ORDER — LEVOTHYROXINE SODIUM 25 MCG
25 TABLET ORAL DAILY
COMMUNITY
Start: 2023-10-05

## 2023-10-16 RX ORDER — MIDODRINE HYDROCHLORIDE 10 MG/1
10 TABLET ORAL EVERY 8 HOURS
Status: DISCONTINUED | OUTPATIENT
Start: 2023-10-16 | End: 2023-10-16

## 2023-10-16 RX ORDER — CHLORHEXIDINE GLUCONATE 0.12 MG/ML
15 RINSE ORAL EVERY 12 HOURS SCHEDULED
Status: DISCONTINUED | OUTPATIENT
Start: 2023-10-16 | End: 2023-10-21

## 2023-10-16 RX ORDER — NITROGLYCERIN 0.4 MG/1
0.4 TABLET SUBLINGUAL
Status: DISCONTINUED | OUTPATIENT
Start: 2023-10-16 | End: 2023-10-25 | Stop reason: HOSPADM

## 2023-10-16 RX ORDER — SIMVASTATIN 40 MG
40 TABLET ORAL NIGHTLY
COMMUNITY
Start: 2023-08-01

## 2023-10-16 RX ORDER — NOREPINEPHRINE BITARTRATE 0.03 MG/ML
.02-.3 INJECTION, SOLUTION INTRAVENOUS
Status: DISCONTINUED | OUTPATIENT
Start: 2023-10-16 | End: 2023-10-18

## 2023-10-16 RX ORDER — BUPROPION HYDROCHLORIDE 300 MG/1
300 TABLET ORAL EVERY MORNING
COMMUNITY
Start: 2023-08-01

## 2023-10-16 RX ORDER — BISACODYL 10 MG
10 SUPPOSITORY, RECTAL RECTAL DAILY PRN
Status: DISCONTINUED | OUTPATIENT
Start: 2023-10-16 | End: 2023-10-21

## 2023-10-16 RX ORDER — ROPINIROLE 3 MG/1
3 TABLET, FILM COATED ORAL NIGHTLY
COMMUNITY
Start: 2023-08-01

## 2023-10-16 RX ORDER — SODIUM CHLORIDE 0.9 % (FLUSH) 0.9 %
10 SYRINGE (ML) INJECTION EVERY 12 HOURS SCHEDULED
Status: DISCONTINUED | OUTPATIENT
Start: 2023-10-16 | End: 2023-10-25 | Stop reason: HOSPADM

## 2023-10-16 RX ORDER — PLECANATIDE 3 MG/1
1 TABLET ORAL DAILY
COMMUNITY
Start: 2023-10-14

## 2023-10-16 RX ORDER — AMOXICILLIN 250 MG
2 CAPSULE ORAL 2 TIMES DAILY
Status: DISCONTINUED | OUTPATIENT
Start: 2023-10-16 | End: 2023-10-21

## 2023-10-16 RX ORDER — SUCCINYLCHOLINE CHLORIDE 20 MG/ML
100 INJECTION INTRAMUSCULAR; INTRAVENOUS ONCE
Status: COMPLETED | OUTPATIENT
Start: 2023-10-16 | End: 2023-10-16

## 2023-10-16 RX ORDER — HEPARIN SODIUM 10000 [USP'U]/100ML
18 INJECTION, SOLUTION INTRAVENOUS
Status: DISCONTINUED | OUTPATIENT
Start: 2023-10-16 | End: 2023-10-18

## 2023-10-16 RX ORDER — ACETAMINOPHEN 325 MG/1
650 TABLET ORAL EVERY 4 HOURS PRN
Status: DISCONTINUED | OUTPATIENT
Start: 2023-10-16 | End: 2023-10-21

## 2023-10-16 RX ORDER — FENTANYL CITRATE 50 UG/ML
25 INJECTION, SOLUTION INTRAMUSCULAR; INTRAVENOUS
Status: DISCONTINUED | OUTPATIENT
Start: 2023-10-16 | End: 2023-10-17

## 2023-10-16 RX ORDER — VANCOMYCIN 2 GRAM/500 ML IN 0.9 % SODIUM CHLORIDE INTRAVENOUS
20 ONCE
Status: COMPLETED | OUTPATIENT
Start: 2023-10-16 | End: 2023-10-16

## 2023-10-16 RX ORDER — FAMOTIDINE 10 MG/ML
20 INJECTION, SOLUTION INTRAVENOUS DAILY
Status: DISCONTINUED | OUTPATIENT
Start: 2023-10-16 | End: 2023-10-18

## 2023-10-16 RX ORDER — OXYCODONE AND ACETAMINOPHEN 10; 325 MG/1; MG/1
1 TABLET ORAL EVERY 12 HOURS PRN
COMMUNITY
Start: 2023-09-24

## 2023-10-16 RX ORDER — MULTIPLE VITAMINS W/ MINERALS TAB 9MG-400MCG
1 TAB ORAL DAILY
COMMUNITY

## 2023-10-16 RX ADMIN — WHITE PETROLATUM 57.7 %-MINERAL OIL 31.9 % EYE OINTMENT 1 APPLICATION: at 18:07

## 2023-10-16 RX ADMIN — PIPERACILLIN AND TAZOBACTAM 3.38 G: 3; .375 INJECTION, POWDER, LYOPHILIZED, FOR SOLUTION INTRAVENOUS at 12:31

## 2023-10-16 RX ADMIN — Medication 10 ML: at 21:58

## 2023-10-16 RX ADMIN — Medication 10 ML: at 14:37

## 2023-10-16 RX ADMIN — WHITE PETROLATUM 57.7 %-MINERAL OIL 31.9 % EYE OINTMENT 1 APPLICATION: at 23:21

## 2023-10-16 RX ADMIN — FENTANYL CITRATE 25 MCG: 50 INJECTION, SOLUTION INTRAMUSCULAR; INTRAVENOUS at 16:24

## 2023-10-16 RX ADMIN — WHITE PETROLATUM 57.7 %-MINERAL OIL 31.9 % EYE OINTMENT 1 APPLICATION: at 16:24

## 2023-10-16 RX ADMIN — IOPAMIDOL 100 ML: 755 INJECTION, SOLUTION INTRAVENOUS at 11:24

## 2023-10-16 RX ADMIN — WHITE PETROLATUM 57.7 %-MINERAL OIL 31.9 % EYE OINTMENT 1 APPLICATION: at 14:18

## 2023-10-16 RX ADMIN — SODIUM CHLORIDE 1000 ML: 9 INJECTION, SOLUTION INTRAVENOUS at 08:36

## 2023-10-16 RX ADMIN — FAMOTIDINE 20 MG: 10 INJECTION INTRAVENOUS at 14:37

## 2023-10-16 RX ADMIN — ETOMIDATE 10 MG: 40 INJECTION, SOLUTION INTRAVENOUS at 07:59

## 2023-10-16 RX ADMIN — PIPERACILLIN AND TAZOBACTAM 3.38 G: 3; .375 INJECTION, POWDER, LYOPHILIZED, FOR SOLUTION INTRAVENOUS at 21:06

## 2023-10-16 RX ADMIN — DOCUSATE SODIUM 50MG AND SENNOSIDES 8.6MG 2 TABLET: 8.6; 5 TABLET, FILM COATED ORAL at 21:58

## 2023-10-16 RX ADMIN — CHLORHEXIDINE GLUCONATE 15 ML: 1.2 RINSE ORAL at 14:37

## 2023-10-16 RX ADMIN — CHLORHEXIDINE GLUCONATE 15 ML: 1.2 RINSE ORAL at 21:58

## 2023-10-16 RX ADMIN — SUCCINYLCHOLINE CHLORIDE 100 MG: 20 INJECTION, SOLUTION INTRAMUSCULAR; INTRAVENOUS at 07:59

## 2023-10-16 RX ADMIN — VANCOMYCIN HYDROCHLORIDE 2000 MG: 500 INJECTION, POWDER, LYOPHILIZED, FOR SOLUTION INTRAVENOUS at 11:39

## 2023-10-16 RX ADMIN — WHITE PETROLATUM 57.7 %-MINERAL OIL 31.9 % EYE OINTMENT 1 APPLICATION: at 21:07

## 2023-10-16 RX ADMIN — PROPOFOL 5 MCG/KG/MIN: 10 INJECTION, EMULSION INTRAVENOUS at 13:35

## 2023-10-16 RX ADMIN — HEPARIN SODIUM 18 UNITS/KG/HR: 10000 INJECTION, SOLUTION INTRAVENOUS at 15:03

## 2023-10-16 RX ADMIN — NOREPINEPHRINE BITARTRATE 0.02 MCG/KG/MIN: 1 INJECTION, SOLUTION, CONCENTRATE INTRAVENOUS at 10:21

## 2023-10-16 RX ADMIN — FENTANYL CITRATE 25 MCG: 50 INJECTION, SOLUTION INTRAMUSCULAR; INTRAVENOUS at 23:20

## 2023-10-16 RX ADMIN — SODIUM CHLORIDE 100 ML/HR: 9 INJECTION, SOLUTION INTRAVENOUS at 14:17

## 2023-10-16 NOTE — CONSULTS
Pulmonary / Critical Care Consult Note      Patient Name: Chula Adkins  : 1940  MRN: [id number]  Primary Care Physician:  Owen Mathias MD  Referring Physician: No ref. provider found  Date of admission: 10/16/2023    Subjective   Subjective     Reason for Consult/ Chief Complaint: Respiratory failure, respiratory acidosis, acute hypercapnia    HPI:  Chula Adkins is a 83 y.o. female who was brought to [facility] ER today with via EMS with worsening shortness of breath and altered mental status.  Per report, patient was desaturating to 80s with altered mental status, somnolence, increased work of breathing and shortness of breath at home and her  called EMS.  In the ER, patient was not optimally responsive and was having increased and labored respiratory effort.  Her oxygen saturation was in 62%.  She was intubated at bedside and placed on invasive mechanical ventilator.  She was hypotensive postprocedure and was started on Levophed drip.  Apparently, patient was found to have Chapstick on the back of her throat by respiratory therapy after intubation.  Patient is being admitted to ICU with acute hypercapnic respiratory failure.  Pulm and critical services consulted for extensive management of her acute issues.  CT scan of the chest was done after intubation which showed worsening consolidation in the lower lungs.  There was also suggested pulmonary embolus and subsegmental left lower lobe pulmonary artery branch and probable segmental left upper lobe branch.  CT scan of the head showed no acute intracranial abnormality.  She has history of hypothyroidism, chronic pain syndrome and restless leg syndrome.  She never smoked.  She has history of gastroesophageal reflux disease and needs to gag to get the secretions out at times.  She has history of pneumonia in past.  She is a lifelong non-smoker.  Currently she is on invasive mechanical ventilator with vent settings at  PEEP of 5, 50% FiO2, rate of 18 and tidal volume of 500.  She is on propofol drip.  She is not optimally responsive.    Review of Systems  Could not be obtained, patient not optimally responsive.      Personal History     History reviewed. No pertinent past medical history.    History reviewed. No pertinent surgical history.    Family History: No known family history of chronic lung disease or heart disease.    Social History:  With her , and lifelong non-smoker.    Home Medications:  Apoaequorin, Plecanatide, amitriptyline, buPROPion XL, carvedilol, levothyroxine, oxyCODONE-acetaminophen, rOPINIRole, and simvastatin    Allergies:  Not on File    Objective    Objective     Vitals:   Temp:  [91.8 °F (33.2 °C)-94.3 °F (34.6 °C)] 94.3 °F (34.6 °C)  Heart Rate:  [] 91  Resp:  [18-22] 21  BP: ()/(45-88) 97/85  FiO2 (%):  [50 %-100 %] 50 %    Physical Exam:  Vital Signs Reviewed   Critically ill, on invasive mechanical ventilator  HEENT:  PERRL, EOMI. ET tube orally  Neck:  Supple, no JVD, no thyromegaly  Lymph: no axillary, cervical, supraclavicular lymphadenopathy noted bilaterally  Chest: Bilateral coarse mechanical ventilator breath sounds, no wheezing, crackles or rhonchi, resonant to percussion bilaterally   CV: RRR, no MGR, pulses 2+, equal  Abd:  Soft, NT, ND, + BS, no HSM  EXT:  no clubbing, no cyanosis, no edema, no joint tenderness  Neuro: Sedated, not optimally responsive, on invasive mechanical ventilator  Skin: No rashes or lesions noted      Result Review    Result Review:  I have personally reviewed the results from the time of this admission to 10/16/2023 13:06 EDT and agree with these findings:  [x]  Laboratory  [x]  Microbiology  [x]  Radiology  [x]  EKG/Telemetry   [x]  Cardiology/Vascular   []  Pathology  []  Old records  []  Other:  Most notable findings include:         Lab 10/16/23  0807   WBC 5.38   HEMOGLOBIN 9.8*   HEMATOCRIT 32.0*   PLATELETS 154   SODIUM 138   POTASSIUM 4.8    CHLORIDE 107   CO2 18.4*   BUN 54*   CREATININE 1.20*   GLUCOSE 121*   CALCIUM 8.0*   PHOSPHORUS 4.1   TOTAL PROTEIN 4.8*   ALBUMIN 3.1*   GLOBULIN 1.7     XR Chest 1 View    Result Date: 10/16/2023  PROCEDURE: XR CHEST 1 VW  COMPARISON: None  INDICATIONS: Weak/Dizzy/AMS triage protocol  FINDINGS:  ET tube tip above the raquel.  Nasogastric tube tip at the GE junction.  This needs to be advanced.  There is prominence of markings within the interstitium of both lungs.  The finding is nonspecific.  This might be due to inflammatory infectious process.  There is some atelectasis or scarring in the left mid chest.  There are no pleural effusions.  There are degenerative changes involving the shoulders.      Impression:   1. Nasogastric tube tip is around the GE junction and needs to be advanced. 2. Prominence of markings within the interstitium of both lungs which is nonspecific but may be due to inflammatory infectious process.  There is some atelectasis or scarring in the left mid chest. 3. There is some nonspecific gaseous distention of bowel in what is visualized of the abdomen.       FRAN PATRICIO MD       Electronically Signed and Approved By: FRAN PATRICIO MD on 10/16/2023 at 8:22              CT Chest With Contrast Diagnostic    Result Date: 10/16/2023  PROCEDURE: CT CHEST W CONTRAST DIAGNOSTIC  COMPARISON:  [facility], CT, CT CHEST WO CONTRAST DIAGNOSTIC, 10/16/2023, 8:52. INDICATIONS: unresponsive, intubated, eval for PE  TECHNIQUE: After obtaining the patient's consent, CT images were obtained with non-ionic intravenous contrast material.   PROTOCOL:   Pulmonary embolism imaging protocol performed    RADIATION:   DLP: 494.8mGy*cm   Automated exposure control was utilized to minimize radiation dose. CONTRAST: 100cc Isovue 370 I.V.  FINDINGS:  There is an ET tube present with its tip above the raquel.  There is a nasogastric tube present with its tip extending into the stomach.  Assessment of  lower lobe pulmonary arterial branches limited by underlying consolidation.  It looks like there is at least a small pulmonary embolus in a lower lobe pulmonary arterial branch best seen on axial image number 135. There also may be thrombus involving left upper lobe pulmonary arterial branch on image number 91. There is some dilatation of the ascending thoracic aorta which measures about 3.9 x 3.8 cm.  There is coronary artery calcification present.   There is bibasilar consolidation which could be secondary to atelectasis or pneumonia and has worsened since earlier study.  There are degenerative changes involving the lower thoracic spine/upper lumbar spine.  Lower slices through the upper abdomen reveal no obvious acute findings.  There is a left renal cyst suggested.      Impression:   1. Worsening consolidation in the lower lungs. 2. Suggested pulmonary embolus in subsegmental left lower lobe pulmonary arterial branch and probable segmental left upper lobe branch. 3. Previously noted air within the venous vascular structures in the upper chest area is resolving. 4. Coronary artery calcification.     FRAN PATRICIO MD       Electronically Signed and Approved By: FRAN PATRICIO MD on 10/16/2023 at 11:59             CT Chest Without Contrast Diagnostic    Result Date: 10/16/2023  PROCEDURE: CT CHEST WO CONTRAST DIAGNOSTIC  COMPARISON: None  INDICATIONS: found down, intubated, foreign body, chapstick lid found in esophagus upon og tube placement  TECHNIQUE: CT images were created without the administration of contrast material.   PROTOCOL:   Standard imaging protocol performed    RADIATION:   DLP: 408.3mGy*cm   Automated exposure control was utilized to minimize radiation dose.  FINDINGS:  Harper/mediastinum Endotracheal tube 2 cm above the raquel.  Gastric tube tip in the distal esophagus just proximal to the GE junction.  No abnormal mediastinal fluid collection.  No definite adenopathy.  Thoracic aorta normal in  caliber.  No pericardial effusion.  Gas in the bilateral subclavian and brachiocephalic veins and supra-azygos SVC, probably less than 10 cc.  Minimal gas in the nondependent pulmonary trunk  Dependent airspace opacities in the upper and lower lobes.  No suspicious consolidation or edema.  No significant pleural effusion  Upper abdomen:  No acute abnormality next para bones/soft tissues:  No destructive bone lesion.  Prominent degenerative disc disease in the mid and lower thoracic spine.  Mild wedge compression fractures of T3, T4, and T5 which are likely chronic      Impression:    1. Dependent airspace opacities in the upper and lower lobes that likely represents dependent atelectasis.  Aspiration could appear similar  2. Gastric tube in the distal esophagus.  Recommend advancing the tube 10 cm to ensure that the tip and side port are in the stomach      SENAIT BLAS MD       Electronically Signed and Approved By: SENAIT BLAS MD on 10/16/2023 at 9:29              CT Head Without Contrast    Result Date: 10/16/2023  PROCEDURE: CT HEAD WO CONTRAST  COMPARISON:  None INDICATIONS: found down, unresponsive, intubated  PROTOCOL:   Standard imaging protocol performed    RADIATION:   DLP: 1015mGy*cm   MA and/or KV was adjusted to minimize radiation dose.     TECHNIQUE: After obtaining the patient's consent, CT images were obtained without non-ionic intravenous contrast material.  FINDINGS:  No hemorrhage, edema, or mass effect.  No convincing loss of gray-white differentiation.  Scattered foci of chronic microvascular ischemic white matter change.  No abnormal extra-axial fluid collection.  CSF spaces within normal limits.  Complete opacification of the left maxillary sinus with bony thickening of the lateral wall, thinning and medial bowing of the medial wall, and calcification of maxillary sinus contents      Impression:    1. No intracranial hemorrhage  2. No CT changes of major vascular territory brain ischemia  or global anoxia at this time  3. 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     SENAIT BLAS MD       Electronically Signed and Approved By: SENAIT BLAS MD on 10/16/2023 at 9:14                      Assessment & Plan   Assessment / Plan     Active Hospital Problems:  Active Hospital Problems    Diagnosis     **Acute respiratory failure with hypoxia     Acute respiratory failure with hypoxia and hypercapnia     [place] coma scale total score 3-8     Lactic acidosis          Impression:  Acute hypoxic and hypercapnic respiratory failure  Altered mental status, toxic metabolic encephalopathy  Possible pneumonia  Pulmonary embolism  History of hypothyroidism  Restless leg syndrome  Chronic pain syndrome      Plan:  Patient apparently was found to have Chapstick On the back of the throat after intubation.  Concern of aspiration pneumonia.  Continue with IV Zosyn.  Was given vancomycin as well in the ER.  Continue with Levophed drip to keep MAP more than 65 mmHg  Started on heparin drip.  Continue with propofol drip.  Check MRI of the brain and EEG.  Check echocardiogram.  We will consider bronchoscopy.  Risk and benefits of the procedure were discussed in detail with her .  He is agreeable.  Continue with invasive mechanical ventilator, vent settings at AC VC 20/450/PEEP of 5/FiO2 to keep saturations more than 90%.  Replete electrolytes as needed.  Check procalcitonin.  Check blood culture.  Check urine Legionella and strep antigen.      Prognosis: Poor    Patient is critically ill in ICU with acute hypoxic and hypercapnic respiratory failure, possible pneumonia, aspiration, altered mental status, pulmonary embolism.  I spent 33 minutes of critical care time, excluding any procedure notes, in review, analysis, obtaining history and physical, formulating care plan, and I led multi-disciplinary critical care rounds with bedside nurse, respiratory therapist, clinical pharmacist and other  allied services. I have discussed the case with primary service and other consultants as well.     Electronically signed by Chong Francis MD, 10/16/2023, 13:06 EDT.

## 2023-10-16 NOTE — ED PROVIDER NOTES
"Time: 8:06 AM EDT  Date of encounter:  10/16/2023  Independent Historian/Clinical History and Information was obtained by:   EMS    History is limited by: Altered Mental Status    Chief Complaint: Altered mental status and decreased oxygenation      History of Present Illness:  Patient is a 83 y.o. year old female who presents to the emergency department for evaluation of altered mental status and decreased oxygen levels first noted this morning by  shortly prior to ED presentation.  She was last known normal at 2 this morning.  MS that her oxygen sats on room air were in the mid 80s when they arrived at the scene.    HPI    Patient Care Team  Primary Care Provider: Owen Mathias MD    Past Medical History:     Not on File  History reviewed. No pertinent past medical history.  History reviewed. No pertinent surgical history.  History reviewed. No pertinent family history.    Home Medications:  Prior to Admission medications    Not on File        Social History:          Review of Systems:       Physical Exam:  /72 (BP Location: Left arm, Patient Position: Lying)   Pulse 81   Temp (!) 91.9 °F (33.3 °C) (Bladder)   Resp 22   Ht 162.6 cm (64\")   Wt 99 kg (218 lb 4.1 oz)   SpO2 100%   BMI 37.46 kg/m²     Physical Exam  Constitutional:       Comments: Patient is unresponsive having labored respirations   HENT:      Head: Normocephalic and atraumatic.      Nose: Nose normal.      Mouth/Throat:      Mouth: Mucous membranes are moist.   Eyes:      Extraocular Movements: Extraocular movements intact.      Conjunctiva/sclera: Conjunctivae normal.      Pupils: Pupils are equal, round, and reactive to light.   Cardiovascular:      Rate and Rhythm: Normal rate and regular rhythm.      Pulses: Normal pulses.      Heart sounds: Normal heart sounds.   Pulmonary:      Effort: Pulmonary effort is normal.      Breath sounds: Normal breath sounds. No wheezing.   Abdominal:      Palpations: Abdomen is soft.     "  Tenderness: There is no abdominal tenderness.   Musculoskeletal:         General: Normal range of motion.      Cervical back: Normal range of motion and neck supple.      Right lower leg: No edema.      Left lower leg: No edema.   Skin:     General: Skin is warm and dry.      Capillary Refill: Capillary refill takes less than 2 seconds.      Findings: No rash.   Neurological:      General: No focal deficit present.      Mental Status: She is oriented to person, place, and time. Mental status is at baseline.      Cranial Nerves: No cranial nerve deficit.      Sensory: No sensory deficit.      Motor: No weakness.   Psychiatric:         Mood and Affect: Mood normal.         Behavior: Behavior normal.        Review of systems: Unable to obtain due to altered mental status          Procedures:  Intubation    Date/Time: 10/16/2023 8:11 AM    Performed by: Yao Chauahn MD  Authorized by: Yao Chauhan MD    Consent:     Consent obtained:  Emergent situation  Universal protocol:     Procedure explained and questions answered to patient or proxy's satisfaction: no    Pre-procedure details:     Indications: altered consciousness and respiratory distress      Patient status:  Unresponsive    Mouth opening - incisor distance:  3 or more finger widths  Successful intubation attempt details:     Intubation method:  Oral    Intubation technique: video assisted      Laryngoscope blade:  Yee 3    Tube size (mm):  7.5    Tube type:  Cuffed    Tube visualized through cords: yes    Placement assessment:     Tube secured with:  ETT medina    Breath sounds:  Equal    Placement verification: chest rise and equal breath sounds      CXR findings:  Appropriate position  Post-procedure details:     Procedure completion:  Tolerated well, no immediate complications        Medical Decision Making:      Comorbidities that affect care:    None    External Notes reviewed:    None      The following orders were placed and all results were  independently analyzed by me:  Orders Placed This Encounter   Procedures    Intubation    Respiratory Culture - Sputum, ET Suction    Blood Culture - Blood,    Blood Culture - Blood,    XR Chest 1 View    CT Head Without Contrast    CT Cervical Spine Without Contrast    CT Chest Without Contrast Diagnostic    CT Chest With Contrast Diagnostic    [place] Draw    Comprehensive Metabolic Panel    Single High Sensitivity Troponin T    Magnesium    Urinalysis With Microscopic If Indicated (No Culture) - Urine, Clean Catch    CBC Auto Differential    Lactic Acid, Plasma    Blood Gas, Arterial -With Co-Ox Panel: Yes    STAT Lactic Acid, Reflex    TSH    T4, Free    NPO Diet NPO Type: Strict NPO    Undress & Gown    Continuous Pulse Oximetry    Vital Signs    Orthostatic Blood Pressure    IP General Consult (Use specialty-specific consult if known)    Hospitalist (on-call MD unless specified)    Oxygen Therapy- Nasal Cannula; Titrate 1-6 LPM Per SpO2; 90 - 95%    SLP Consult: Eval & Treat RN Dysphagia Screen Failed    POC Glucose Once    ECG 12 Lead ED Triage Standing Order; Weak / Dizzy / AMS    Insert Peripheral IV    Fall Precautions    CBC & Differential    Green Top (Gel)    Lavender Top    Gold Top - SST    Light Blue Top       Medications Given in the Emergency Department:  Medications   sodium chloride 0.9 % flush 10 mL (has no administration in time range)   norepinephrine (LEVOPHED) 8 mg in 250 mL NS infusion (premix) (0.06 mcg/kg/min × 99 kg Intravenous Rate/Dose Change 10/16/23 1039)   vancomycin IVPB 2000 mg in 0.9% Sodium Chloride 500 mL (has no administration in time range)   piperacillin-tazobactam (ZOSYN) 3.375 g/100 mL 0.9% NS IVPB (mbp) (has no administration in time range)   etomidate (AMIDATE) injection 10 mg (10 mg Intravenous Given 10/16/23 0759)   succinylcholine (ANECTINE) injection 100 mg (100 mg Intravenous Given 10/16/23 0759)   sodium chloride 0.9 % bolus 1,000 mL (0 mL Intravenous Stopped  10/16/23 0920)        ED Course:    ED Course as of 10/16/23 1059   Mon Oct 16, 2023   0958 EG: Rate 83, prolonged perianal, normal P waves, IVCD, nonspecific ST segment changes, normal QT interval, no previous for comparison. [RW]      ED Course User Index  [RW] Yao Chauhan MD       Labs:    Lab Results (last 24 hours)       Procedure Component Value Units Date/Time    CBC & Differential [[id number]]  (Abnormal) Collected: 10/16/23 0807    Specimen: Blood Updated: 10/16/23 0814    Narrative:      The following orders were created for panel order CBC & Differential.  Procedure                               Abnormality         Status                     ---------                               -----------         ------                     CBC Auto Differential[[id number]]        Abnormal            Final result                 Please view results for these tests on the individual orders.    Comprehensive Metabolic Panel [[id number]]  (Abnormal) Collected: 10/16/23 0807    Specimen: Blood Updated: 10/16/23 0831     Glucose 121 mg/dL      BUN 54 mg/dL      Creatinine 1.20 mg/dL      Sodium 138 mmol/L      Potassium 4.8 mmol/L      Chloride 107 mmol/L      CO2 18.4 mmol/L      Calcium 8.0 mg/dL      Total Protein 4.8 g/dL      Albumin 3.1 g/dL      ALT (SGPT) 11 U/L      AST (SGOT) 10 U/L      Alkaline Phosphatase 60 U/L      Total Bilirubin 0.2 mg/dL      Globulin 1.7 gm/dL      A/G Ratio 1.8 g/dL      BUN/Creatinine Ratio 45.0     Anion Gap 12.6 mmol/L      eGFR 45.6 mL/min/1.73     Narrative:      GFR Normal >60  Chronic Kidney Disease <60  Kidney Failure <15    The GFR formula is only valid for adults with stable renal function between ages 18 and 70.    Single High Sensitivity Troponin T [[id number]]  (Abnormal) Collected: 10/16/23 0807    Specimen: Blood Updated: 10/16/23 0831     HS Troponin T 15 ng/L     Narrative:      High Sensitive Troponin T Reference Range:  <10.0 ng/L- Negative Female for AMI  <15.0 ng/L-  Negative Male for AMI  >=10 - Abnormal Female indicating possible myocardial injury.  >=15 - Abnormal Male indicating possible myocardial injury.   Clinicians would have to utilize clinical acumen, EKG, Troponin, and serial changes to determine if it is an Acute Myocardial Infarction or myocardial injury due to an underlying chronic condition.         Magnesium [[id number]]  (Normal) Collected: 10/16/23 0807    Specimen: Blood Updated: 10/16/23 0831     Magnesium 1.6 mg/dL     CBC Auto Differential [[id number]]  (Abnormal) Collected: 10/16/23 0807    Specimen: Blood Updated: 10/16/23 0814     WBC 5.38 10*3/mm3      RBC 3.28 10*6/mm3      Hemoglobin 9.8 g/dL      Hematocrit 32.0 %      MCV 97.6 fL      MCH 29.9 pg      MCHC 30.6 g/dL      RDW 12.4 %      RDW-SD 44.5 fl      MPV 10.5 fL      Platelets 154 10*3/mm3      Neutrophil % 65.3 %      Lymphocyte % 21.0 %      Monocyte % 8.7 %      Eosinophil % 3.5 %      Basophil % 1.3 %      Immature Grans % 0.2 %      Neutrophils, Absolute 3.51 10*3/mm3      Lymphocytes, Absolute 1.13 10*3/mm3      Monocytes, Absolute 0.47 10*3/mm3      Eosinophils, Absolute 0.19 10*3/mm3      Basophils, Absolute 0.07 10*3/mm3      Immature Grans, Absolute 0.01 10*3/mm3      nRBC 0.0 /100 WBC     Lactic Acid, Plasma [[id number]]  (Abnormal) Collected: 10/16/23 0807    Specimen: Blood Updated: 10/16/23 0841     Lactate 5.9 mmol/L     TSH [[id number]]  (Normal) Collected: 10/16/23 0807    Specimen: Blood Updated: 10/16/23 1023     TSH 2.170 uIU/mL     T4, Free [[id number]]  (Normal) Collected: 10/16/23 0807    Specimen: Blood Updated: 10/16/23 1024     Free T4 0.93 ng/dL     Narrative:      Results may be falsely increased if patient taking Biotin.      Urinalysis With Microscopic If Indicated (No Culture) - Urine, Clean Catch [[id number]]  (Normal) Collected: 10/16/23 0812    Specimen: Urine, Clean Catch Updated: 10/16/23 0830     Color, UA Yellow     Appearance, UA Clear     pH, UA 5.5      Specific Gravity, UA 1.023     Glucose, UA Negative     Ketones, UA Negative     Bilirubin, UA Negative     Blood, UA Negative     Protein, UA Negative     Leuk Esterase, UA Negative     Nitrite, UA Negative     Urobilinogen, UA 1.0 E.U./dL    Narrative:      Urine microscopic not indicated.    Respiratory Culture - Sputum, ET Suction [[id number]] Collected: 10/16/23 0816    Specimen: Sputum from ET Suction Updated: 10/16/23 1004     Gram Stain Rare (1+) WBCs seen      Rare (1+) Gram positive cocci    Blood Culture - Blood, Arm, Left [[id number]] Collected: 10/16/23 0822    Specimen: Blood from Arm, Left Updated: 10/16/23 0923    Blood Culture - Blood, Arm, Left [[id number]] Collected: 10/16/23 0827    Specimen: Blood from Arm, Left Updated: 10/16/23 0923    Blood Gas, Arterial -With Co-Ox Panel: Yes [[id number]]  (Abnormal) Collected: 10/16/23 0830    Specimen: Arterial Blood Updated: 10/16/23 0832     pH, Arterial 7.191 pH units      pCO2, Arterial 55.1 mm Hg      pO2, Arterial 235.4 mm Hg      HCO3, Arterial 20.6 mmol/L      Base Excess, Arterial -7.9 mmol/L      O2 Saturation, Arterial 99.0 %      Hemoglobin, Blood Gas 12.8 g/dL      Carboxyhemoglobin 0.3 %      Methemoglobin 0.20 %      Oxyhemoglobin 98.5 %      FHHB 1.0 %      Site Arterial: left brachial     Modality Ventilator     FIO2 100 %      PO2/FIO2 235     Sushant's Test N/A     Note ac 20, 400, +10     Set [facility] Resp Rate 20     PEEP 10     Lactate, Arterial --             Imaging:    CT Chest Without Contrast Diagnostic    Result Date: 10/16/2023  PROCEDURE: CT CHEST WO CONTRAST DIAGNOSTIC  COMPARISON: None  INDICATIONS: found down, intubated, foreign body, chapstick lid found in esophagus upon og tube placement  TECHNIQUE: CT images were created without the administration of contrast material.   PROTOCOL:   Standard imaging protocol performed    RADIATION:   DLP: 408.3mGy*cm   Automated exposure control was utilized to minimize radiation dose.  FINDINGS:   Harper/mediastinum Endotracheal tube 2 cm above the raquel.  Gastric tube tip in the distal esophagus just proximal to the GE junction.  No abnormal mediastinal fluid collection.  No definite adenopathy.  Thoracic aorta normal in caliber.  No pericardial effusion.  Gas in the bilateral subclavian and brachiocephalic veins and supra-azygos SVC, probably less than 10 cc.  Minimal gas in the nondependent pulmonary trunk  Dependent airspace opacities in the upper and lower lobes.  No suspicious consolidation or edema.  No significant pleural effusion  Upper abdomen:  No acute abnormality next para bones/soft tissues:  No destructive bone lesion.  Prominent degenerative disc disease in the mid and lower thoracic spine.  Mild wedge compression fractures of T3, T4, and T5 which are likely chronic         1. Dependent airspace opacities in the upper and lower lobes that likely represents dependent atelectasis.  Aspiration could appear similar  2. Gastric tube in the distal esophagus.  Recommend advancing the tube 10 cm to ensure that the tip and side port are in the stomach      SENAIT BLAS MD       Electronically Signed and Approved By: SENAIT BLAS MD on 10/16/2023 at 9:29             CT Cervical Spine Without Contrast    Result Date: 10/16/2023  PROCEDURE: CT CERVICAL SPINE WO CONTRAST  COMPARISON: None  INDICATIONS: Neck trauma, abnormal mental status or neuro exam  PROTOCOL:   Standard imaging protocol performed    RADIATION:   DLP: 189.2mGy*cm   MA and/or KV was adjusted to minimize radiation dose.     TECHNIQUE: After obtaining the patient's consent, multi-planar CT images were created without contrast material.   FINDINGS:  No evidence of fracture or traumatic subluxation.  Moderate degenerative disc disease at C6-C7.  Diffuse facet arthropathy.  No prevertebral soft tissue swelling.  Endotracheal and orogastric tubes present.  The orogastric tube is coiled in the mouth prior to entering the esophagus.   Intravenous gas present in the bilateral subclavian and brachiocephalic veins         1. No evidence of cervical spine fracture  2. Intravenous gas in the bilateral subclavian and brachiocephalic veins     SENAIT BLAS MD       Electronically Signed and Approved By: SENAIT BLAS MD on 10/16/2023 at 9:19             CT Head Without Contrast    Result Date: 10/16/2023  PROCEDURE: CT HEAD WO CONTRAST  COMPARISON:  None INDICATIONS: found down, unresponsive, intubated  PROTOCOL:   Standard imaging protocol performed    RADIATION:   DLP: 1015mGy*cm   MA and/or KV was adjusted to minimize radiation dose.     TECHNIQUE: After obtaining the patient's consent, CT images were obtained without non-ionic intravenous contrast material.  FINDINGS:  No hemorrhage, edema, or mass effect.  No convincing loss of gray-white differentiation.  Scattered foci of chronic microvascular ischemic white matter change.  No abnormal extra-axial fluid collection.  CSF spaces within normal limits.  Complete opacification of the left maxillary sinus with bony thickening of the lateral wall, thinning and medial bowing of the medial wall, and calcification of maxillary sinus contents         1. No intracranial hemorrhage  2. No CT changes of major vascular territory brain ischemia or global anoxia at this time  3. 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  Nasogastric tube tip at the GE junction.  This needs to be advanced.  There is prominence of markings within the interstitium of both lungs.  The finding is nonspecific.  This might be due to inflammatory infectious process.  There is some atelectasis or scarring in the left mid  chest.  There are no pleural effusions.  There are degenerative changes involving the shoulders.        1. Nasogastric tube tip is around the GE junction and needs to be advanced. 2. Prominence of markings within the interstitium of both lungs which is nonspecific but may be due to inflammatory infectious process.  There is some atelectasis or scarring in the left mid chest. 3. There is some nonspecific gaseous distention of bowel in what is visualized of the abdomen.       FRAN PATRICIO MD       Electronically Signed and Approved By: FRAN PATRICIO MD on 10/16/2023 at 8:22                Differential Diagnosis and Discussion:    Altered Mental Status: Based on the patient's signs and symptoms, differential diagnosis includes but is not limited to meningitis, stroke, sepsis, subarachnoid hemorrhage, intracranial bleeding, encephalitis, and metabolic encephalopathy.    All labs were reviewed and interpreted by me.    MDM  Number of Diagnoses or Management Options  Altered mental status, unspecified altered mental status type  Diagnosis management comments: Patient presents with altered mental status.  Differential considerations include but not limited to stroke versus anoxic brain injury or sepsis.  Patient did require ventilator support while in the emergency department.  She was noted to be hypothermic.  There is question of foreign body aspiration as the cap of Chapstick tube was found intraorally during her evaluation.  Critical care consultation was sought shortly after the patient's initial evaluation.  Medicine service has also been consulted and graciously agreed to admit the patient for definitive management.  Empiric antibiotics were initiated as a precautionary measure.  She has been hypotensive requiring pressor support.  CT scan of the brain does not demonstrate definitive stroke however there is some concern on CT of possible anoxic brain injury.  These findings have been conveyed to the  who is at  the bedside.       Amount and/or Complexity of Data Reviewed  Clinical lab tests: reviewed  Tests in the radiology section of CPT®: reviewed  Tests in the medicine section of CPT®: reviewed         Critical Care Note: Total Critical Care time of 40 minutes. Total critical care time documented does not include time spent on separately billed procedures for services of nurses or physician assistants. I personally saw and examined the patient. I have reviewed all diagnostic interpretations and treatment plans as written. I was present for the key portions of any procedures performed and the inclusive time noted in any critical care statement. Critical care time includes patient management by me, time spent at the patients bedside,  time to review lab and imaging results, discussing patient care, documentation in the medical record, and time spent with family or caregiver.    Patient Care Considerations:  Following the patient's arrival nursing staff found what appeared to be the cap of a Chapstick vial in the patient's oropharynx.        Consultants/Shared Management Plan:        Social Determinants of Health:          Disposition and Care Coordination:    Admit:   Through independent evaluation of the patient's history, physical, and imperical data, the patient meets criteria for observation/admission to the hospital.        Final diagnoses:   Altered mental status, unspecified altered mental status type        ED Disposition       ED Disposition   Decision to Admit    Condition   --    Comment   --               This medical record created using voice recognition software.             Yao Chauhan MD  10/16/23 6901

## 2023-10-16 NOTE — PROGRESS NOTES
"[facility] Clinical Pharmacy Services: Vancomycin Pharmacokinetic Initial Consult Note    Chula Adkins is a 83 y.o. female who is starting pharmacy to dose vancomycin for Pneumonia and Sepsis.    Consult Information  Consulting Provider: SAKINA Rey  Planned Duration of Therapy: 7 days  Was Patient Receiving Prior to Admission/Consult?: No  Loading Dose Ordered or Given: 2000 mg on 10/16 at 1139  PK/PD Target: -600 mg/L.hr   Relevant ID History: Unknown at this aubrey  Other Antimicrobials: Piperacillin/Tazobactam    Imaging Reviewed?: Yes    Microbiology Data  MRSA PCR performed: No; Result: Not ordered due to excluded indication or presence of suspected abscess  Culture/Source:   10/16: Blood - pending  10/16: Blood - pending  10/16: Sputum - GPC     Vitals/Labs  Ht: 162.6 cm (64\"); Wt: 99 kg (218 lb 4.1 oz)  Temp (24hrs), Av.7 °F (33.7 °C), Min:91.8 °F (33.2 °C), Max:94.3 °F (34.6 °C)   Estimated Creatinine Clearance: 42 mL/min (A) (by C-G formula based on SCr of 1.2 mg/dL (H)).       Results from last 7 days   Lab Units 10/16/23  0807   CREATININE mg/dL 1.20*   WBC 10*3/mm3 5.38     Assessment/Plan:    Vancomycin Dose: 2000mg x1   Vanc Random ordered for 10/17 at 0600  Patient has order for Basic Metabolic Panel    Given patient age and renal function, will order random level with AM labs to evaluate clearance prior to additional doses.    Pharmacy will follow patient's kidney function and will adjust doses and obtain levels as necessary. Thank you for involving pharmacy in this patient's care. Please contact pharmacy with any questions or concerns.                           Opal Roth  Clinical Pharmacist    "

## 2023-10-16 NOTE — PROCEDURES
Bronchoscopy Procedure Note    Procedure:  Bronchoscopy with bronchoalveolar lavage right lower lobe  Bronchoscopy with mucous plugging removal  Bronchoscopy with airway inspection    Pre-Operative Diagnosis: Respiratory failure, severe pneumonia with dense consolidation  Post-Operative Diagnosis: Mucous plugging and severe pneumonia    Indication:  Respiratory failure, mucous plugging, pneumonia    Anesthesia: Moderate sedation on invasive mechanical ventilator    Procedure Details: Patient's  was consented for the procedure with all risks and benefits of the procedure explained in detail.   was given the opportunity to ask questions and all concerns were answered.    The bronchoscope was inserted into the main airway via the ET tube.  ET tube was ending in right mainstem bronchus, pulled out 3 cm.  ET tube kept at 22 cm at the lip.  An anatomical survey was done of the main airways and the subsegmental bronchus. The findings are reported below.  Bilateral thick brownish-black mucus plugging was seen, more so in right lower lobe, suctioned out in several attempts.  A bronchoalveolar lavage was performed using 60 mL aliquots of normal saline x1 instilled into the airways then aspirated back from right lower lobe of lung with 30 mL serosanguineous fluid in return.  No excessive bleeding or oozing seen at the end of the procedure.    Findings:  ET tube was ending in right mainstem bronchus, pulled out 3 cm, 22 cm at the lip  Significant mucous plugging, more so in right lower lobe with dark brown specks secretions  Friable mucosa, easy bleeding with suction  Scattered purulent secretions    Estimated Blood Loss: Insignificant    Specimens:  BAL right lower lobe    Complications: None; patient tolerated the procedure well.    Disposition: Remains critically ill on invasive mechanical ventilator    Patient tolerated the procedure well.      Electronically signed by Chong Francis MD, 10/16/2023, 15:12  EDT.

## 2023-10-16 NOTE — H&P
[facility]   HOSPITALIST HISTORY AND PHYSICAL  Date: 10/16/2023   Patient Name: Chula Adkins  : 1940  MRN: [id number]  Primary Care Physician:  Owen Mathias MD  Date of admission: 10/16/2023    Subjective   Subjective     Chief Complaint: Unresponsive    HPI:    Chula Adkins is a 83 y.o. female with past medical history of diet-controlled diabetes mellitus and hypertension, restless leg syndrome, chronic pain syndrome on Percocet by pain management, hyperlipidemia and hypothyroidism.  History is obtained by patient and  at bedside as patient is intubated and unresponsive.  Patient was last seen well around 2:30 AM In Her Kitchen Talking to her cat.  No complaints of chest pain, fever, cough, shortness of air, nausea, vomiting, diarrhea or abdominal pain and no urinary complaints per .  Around 6 AM she was found unresponsive by her .  EMS found her to be hypoxic with O2 sat of 80%.  Work-up in the ED showed 64% saturation patient was hypothermic.  Patient was intubated in ED with no gag reflex.  There was Chapstick found in her throat.  Afterwards she dropped her blood pressure to 77/59 requiring 2 L of fluid and IV Levophed.  Her temp was 91.7 started on warming blanket.  ABG showed pH of 7.19, PCO2 55, PO2 of 233, bicarb of 20, 99% saturation on 100% FiO2.  Troponin T is 15.  Creatinine is 1.2 baseline not available.  BUN of 54.  Lactic acid of 5.9.  TSH free T4 within normal range.  Hemoglobin of 9.8.  UA is negative.  EKG showed normal sinus rhythm.  CT scan of C-spine did not show any fractures it did show gas in bilateral subclavian and brachiocephalic vein.  CT head negative.  CT of the chest showed left upper lobe segmental and left lower lobe subsegmental PE with bilateral lower lobe consolidations more on the righ possibly aspiration.  Gas in the band is improving.  Her sputum and blood culture is pending.  Hospitalist has been called to admit her  for further evaluation.  Per  patient has not been sedentary and her last surgical procedure sounded like a repair of rectal prolapse about 2 months ago.  When patient got to the floor she started to get more agitated.  She was restrained and started on heparin drip    Personal History     Past Medical History:  History reviewed. No pertinent past medical history.  Restless leg syndrome, chronic pain syndrome, diet-controlled diabetes, hypothyroidism    Past Surgical History:  History reviewed. No pertinent surgical history.?  Rectal prolapse repair    Family History:   family history is not on file.  Patient obtunded    Social History:    reports that she has never smoked. She has never used smokeless tobacco. She reports that she does not drink alcohol and does not use drugs.    Home Medications:  Apoaequorin, Plecanatide, amitriptyline, buPROPion XL, carvedilol, levothyroxine, oxyCODONE-acetaminophen, rOPINIRole, and simvastatin    Allergies:  No Active Allergies    Review of Systems   All systems were reviewed and negative except for: Limited as patient is distended    Objective   Objective     Vitals:   Temp:  [91.8 °F (33.2 °C)-94.3 °F (34.6 °C)] 94.3 °F (34.6 °C)  Heart Rate:  [] 112  Resp:  [18-22] 21  BP: ()/(45-88) 97/85  FiO2 (%):  [40 %-100 %] 40 %    Physical Exam    Constitutional: Patient getting more agitated on the floor and starting to move her extremities.  Still not responding to painful stimuli or following the commands.   Eyes: Pupils equal, sclerae anicteric, no conjunctival injection   HENT: NCAT, mucous membranes moist   Neck: Supple, no thyromegaly, no lymphadenopathy, trachea midline   Respiratory: Decreased to auscultation bilaterally, nonlabored respirations    Cardiovascular: RRR, no murmurs, rubs, or gallops, palpable pedal pulses bilaterally   Gastrointestinal: Decreased bowel sounds, soft, nontender, nondistended   Musculoskeletal: No bilateral ankle edema, no  clubbing or cyanosis to extremities   Psychiatric: Patient intubated appropriate affect, cooperative   Neurologic: Patient intubated and restraints.    Skin: No rashes   Genitourinary Eng catheter    Result Review    Result Review:  I have personally reviewed the results from the time of this admission to 10/16/2023 15:39 EDT and agree with these findings:  [x]  Laboratory  [x]  Microbiology  [x]  Radiology  [x]  EKG/Telemetry normal sinus rhythm rate of 83.  QT interval 0.56  []  Cardiology/Vascular   []  Pathology  [x]  Old records  [x]  Other: Medications      Assessment & Plan   Assessment / Plan     Assessment:  Acute hypoxemic and hypercapnic respiratory failure.  No home oxygen use.  Right more than left basilar pneumonia likely aspiration.  Unspecified bacteria.  Left upper lobe segmental and left lower lobe subsegmental acute PE.  Unprovoked first episode.    Sepsis.  Lactic acidosis, hypothermia and hypotension.  Renal insufficiency.  Baseline not known.  Gas in bilateral subclavian and brachiocephalic vein.  Likely from intervention intervention in the ED or by EMS.  Improving  Chronic pain syndrome on Percocet.  Although it is listed as an allergy.  Restless leg syndrome.  Diabetes mellitus diet controlled.  Hypertension diet controlled.  Hypothyroidism.  Supplemented    Plan:   IV fluid  As needed pressors to keep MAP more than 65  Vent as per pulmonary.  Discussed with pulmonary appreciate input.  Plan to do bronchoscopy  IV Vanco and Zosyn.  Check procalcitonin.  Check MRSA PCR.  Await culture data.  As needed Levophed.  Blood pressure improved when she got to the unit.  Await old records.  IV heparin.  Continue warming blanket.  Resume appropriate home medication.  Bronchopulmonary hygiene and bronchodilator protocol.  Discussed with vascular  Discussed with vascular no intervention needed for gas in her subclavian and brachiocephalic veins.  It is already improving on second CT scan.  IV  Pepcid  Check 2D echo.  MRI of the brain.  EEG.  Sedation with dipper Van.  As needed fentanyl.  Restraints.  NG.  Tube feed as per dietitian  Continue Eng catheter placed in ED.  Discussed with ED physician and nursing staff           DVT prophylaxis:  Medical and mechanical DVT prophylaxis orders are present.  Heparin drip    CODE STATUS:         Admission Status:  I believe this patient meets inpatient status.    Part of this note may be an electronic transcription/translation of spoken language to printed text using the Dragon Dictation System.   Critical time spent 35 minutes.    Electronically signed by Husam Rey MD, 10/16/23, 3:39 PM EDT.

## 2023-10-16 NOTE — CONSULTS
Purpose of the visit was to evaluate for: goals of care/advanced care planning and support for patient/family. Spoke with MD, RN, patient, and family and discussed palliative care, goals of care, care options, resuscitation status, advanced care planning, clarify code status, and determination of decision maker.      Assessment: Assessed patient. She is in the CICU. She is sedated and Ventilator. ACVC 20/450/pep of 5 Fio2 at 75%. She had a f/c to [facility] [place]ct [place] & O. Levophed to paintain map greater than 65. She has bilat wrist restraints to prevent pulling of lines, tubes and drains. She is non responsive at present time.   Her  is at bedside. He was ask to wait in the waiting room during procedure (bronchoscopy) while in the waiting room, we discussed their lives. He tells me they have been  for 52 years. They have lost a son and a grandson. They were planning a trip to [place] at the end of the month to stay in their camper for 5 months. He is very tearful speaking of the possibility of her not being here with him. He tells me he doesn't want to hurt her. He is to discuss code status with Ms Sirisha brother when he arrives. The patient has 2 daughters Rin and Shakila. (Numbers on facesheet). He is in agreement with me speaking to them to discuss the condition of their mother. Both daughter whom live out of the state will be traveling to [place] tomorrow morning. They both are aware of the dire situation of their mother but because of their ages, they cannot travel during the night.       Recommendations/Plan: Will follow up with the patients  to further discuss code status and plan of care.     Tasks Completed: Code Status clarification and Emotional Support.    Palliative care to continue to follow and support as needed    Shikha MINOR RN, BSN  Palliative Care

## 2023-10-16 NOTE — CONSULTS
10/16/23 1030   Spiritual Care   Use of Spiritual Resources prayer   Spiritual Care Source  initiative   Spiritual Care Follow-Up will follow closely   Response to Spiritual Care emotion expressed;engaged in conversation;receptive of support;thanks expressed;visit helpful   Spiritual Care Interventions supportive conversation provided   Spiritual Care Visit Type other (see comments)  (ED Critical Care)   Spiritual Care Request decision-making support;family support;prayer support;spiritual/moral support   Receptivity to Spiritual Care other (see comments)  (ED Critical Care)     Pt brought by EMS after  found her unresponsive at home this morning. At time of visit pt is intubated and  at bedside. Pt and her  have not discussed code status and their advance care decisions in the past and pt is Full Code. Emotional and spiritual support provided to  at bedside.

## 2023-10-17 ENCOUNTER — APPOINTMENT (OUTPATIENT)
Dept: CARDIOLOGY | Facility: HOSPITAL | Age: 83
End: 2023-10-17
Payer: MEDICARE

## 2023-10-17 ENCOUNTER — APPOINTMENT (OUTPATIENT)
Dept: GENERAL RADIOLOGY | Facility: HOSPITAL | Age: 83
End: 2023-10-17
Payer: MEDICARE

## 2023-10-17 LAB
ANION GAP SERPL CALCULATED.3IONS-SCNC: 9.7 MMOL/L (ref 5–15)
APTT PPP: 101.3 SECONDS (ref 78–95.9)
APTT PPP: 105.3 SECONDS (ref 78–95.9)
APTT PPP: 129.6 SECONDS (ref 78–95.9)
APTT PPP: 35.8 SECONDS (ref 78–95.9)
ARTERIAL PATENCY WRIST A: POSITIVE
ARTERIAL PATENCY WRIST A: POSITIVE
BASE EXCESS BLDA CALC-SCNC: -4.7 MMOL/L (ref -2–2)
BASE EXCESS BLDA CALC-SCNC: -5 MMOL/L (ref -2–2)
BASOPHILS # BLD AUTO: 0.07 10*3/MM3 (ref 0–0.2)
BASOPHILS NFR BLD AUTO: 0.4 % (ref 0–1.5)
BDY SITE: ABNORMAL
BDY SITE: ABNORMAL
BH CV ECHO MEAS - AO MAX PG: 15 MMHG
BH CV ECHO MEAS - AO MEAN PG: 8.3 MMHG
BH CV ECHO MEAS - AO ROOT DIAM: 3.4 CM
BH CV ECHO MEAS - AO V2 MAX: 194 CM/SEC
BH CV ECHO MEAS - AO V2 VTI: 31.8 CM
BH CV ECHO MEAS - AVA(I,D): 2.14 CM2
BH CV ECHO MEAS - EDV(CUBED): 43 ML
BH CV ECHO MEAS - EDV(MOD-SP2): 50.2 ML
BH CV ECHO MEAS - EDV(MOD-SP4): 60.3 ML
BH CV ECHO MEAS - EF(MOD-BP): 58.2 %
BH CV ECHO MEAS - EF(MOD-SP2): 44.8 %
BH CV ECHO MEAS - EF(MOD-SP4): 67.3 %
BH CV ECHO MEAS - ESV(CUBED): 17.1 ML
BH CV ECHO MEAS - ESV(MOD-SP2): 27.7 ML
BH CV ECHO MEAS - ESV(MOD-SP4): 19.7 ML
BH CV ECHO MEAS - FS: 26.4 %
BH CV ECHO MEAS - IVS/LVPW: 1.01 CM
BH CV ECHO MEAS - IVSD: 1.23 CM
BH CV ECHO MEAS - LA DIMENSION: 3.6 CM
BH CV ECHO MEAS - LAT PEAK E' VEL: 10.2 CM/SEC
BH CV ECHO MEAS - LV MASS(C)D: 140.8 GRAMS
BH CV ECHO MEAS - LV MAX PG: 9 MMHG
BH CV ECHO MEAS - LV MEAN PG: 4.5 MMHG
BH CV ECHO MEAS - LV V1 MAX: 150 CM/SEC
BH CV ECHO MEAS - LV V1 VTI: 22.7 CM
BH CV ECHO MEAS - LVIDD: 3.5 CM
BH CV ECHO MEAS - LVIDS: 2.6 CM
BH CV ECHO MEAS - LVOT AREA: 3 CM2
BH CV ECHO MEAS - LVOT DIAM: 1.95 CM
BH CV ECHO MEAS - LVPWD: 1.22 CM
BH CV ECHO MEAS - MED PEAK E' VEL: 5.8 CM/SEC
BH CV ECHO MEAS - MV A MAX VEL: 106.2 CM/SEC
BH CV ECHO MEAS - MV DEC SLOPE: 415.8 CM/SEC2
BH CV ECHO MEAS - MV DEC TIME: 0.25 SEC
BH CV ECHO MEAS - MV E MAX VEL: 88.6 CM/SEC
BH CV ECHO MEAS - MV E/A: 0.83
BH CV ECHO MEAS - MV MAX PG: 5 MMHG
BH CV ECHO MEAS - MV MEAN PG: 2.32 MMHG
BH CV ECHO MEAS - MV P1/2T: 73.1 MSEC
BH CV ECHO MEAS - MV V2 VTI: 27.5 CM
BH CV ECHO MEAS - MVA(P1/2T): 3 CM2
BH CV ECHO MEAS - MVA(VTI): 2.47 CM2
BH CV ECHO MEAS - RAP SYSTOLE: 3 MMHG
BH CV ECHO MEAS - RVDD: 2.33 CM
BH CV ECHO MEAS - RVSP: 37.4 MMHG
BH CV ECHO MEAS - SV(LVOT): 68 ML
BH CV ECHO MEAS - SV(MOD-SP2): 22.5 ML
BH CV ECHO MEAS - SV(MOD-SP4): 40.6 ML
BH CV ECHO MEAS - TR MAX PG: 34.4 MMHG
BH CV ECHO MEAS - TR MAX VEL: 293.2 CM/SEC
BH CV ECHO MEASUREMENTS AVERAGE E/E' RATIO: 11.08
BUN SERPL-MCNC: 50 MG/DL (ref 8–23)
BUN/CREAT SERPL: 38.2 (ref 7–25)
CA-I BLDA-SCNC: 1.03 MMOL/L (ref 1.13–1.32)
CA-I BLDA-SCNC: 1.09 MMOL/L (ref 1.13–1.32)
CA-I BLDA-SCNC: 1.13 MMOL/L (ref 1.13–1.32)
CA-I BLDA-SCNC: 1.18 MMOL/L (ref 1.13–1.32)
CALCIUM SPEC-SCNC: 8.4 MG/DL (ref 8.6–10.5)
CHLORIDE BLDA-SCNC: 112 MMOL/L (ref 98–106)
CHLORIDE BLDA-SCNC: 113 MMOL/L (ref 98–106)
CHLORIDE SERPL-SCNC: 112 MMOL/L (ref 98–107)
CO2 SERPL-SCNC: 20.3 MMOL/L (ref 22–29)
COHGB MFR BLD: 0.2 % (ref 0–1.5)
COHGB MFR BLD: 0.4 % (ref 0–1.5)
CREAT SERPL-MCNC: 1.31 MG/DL (ref 0.57–1)
D-LACTATE SERPL-SCNC: 1.6 MMOL/L (ref 0.5–2)
D-LACTATE SERPL-SCNC: 2.4 MMOL/L (ref 0.5–2)
DEPRECATED RDW RBC AUTO: 44.9 FL (ref 37–54)
EGFRCR SERPLBLD CKD-EPI 2021: 41 ML/MIN/1.73
EOSINOPHIL # BLD AUTO: 0.08 10*3/MM3 (ref 0–0.4)
EOSINOPHIL NFR BLD AUTO: 0.5 % (ref 0.3–6.2)
ERYTHROCYTE [DISTWIDTH] IN BLOOD BY AUTOMATED COUNT: 12.5 % (ref 12.3–15.4)
FHHB: 14.3 % (ref 0–5)
FHHB: 8.2 % (ref 0–5)
GAS FLOW AIRWAY: 2 LPM
GLUCOSE BLDA-MCNC: 103 MG/DL (ref 65–99)
GLUCOSE BLDA-MCNC: 98 MG/DL (ref 65–99)
GLUCOSE BLDC GLUCOMTR-MCNC: 72 MG/DL (ref 70–99)
GLUCOSE BLDC GLUCOMTR-MCNC: 84 MG/DL (ref 70–99)
GLUCOSE SERPL-MCNC: 90 MG/DL (ref 65–99)
HBA1C MFR BLD: 5.7 % (ref 4.8–5.6)
HCO3 BLDA-SCNC: 20 MMOL/L (ref 22–26)
HCO3 BLDA-SCNC: 20.2 MMOL/L (ref 22–26)
HCT VFR BLD AUTO: 41.1 % (ref 34–46.6)
HGB BLD-MCNC: 12.6 G/DL (ref 12–15.9)
HGB BLDA-MCNC: 11.9 G/DL (ref 11.7–14.6)
HGB BLDA-MCNC: 12.1 G/DL (ref 11.7–14.6)
IMM GRANULOCYTES # BLD AUTO: 0.08 10*3/MM3 (ref 0–0.05)
IMM GRANULOCYTES NFR BLD AUTO: 0.5 % (ref 0–0.5)
INHALED O2 CONCENTRATION: 28 %
INHALED O2 CONCENTRATION: 30 %
LACTATE BLDA-SCNC: 0.92 MMOL/L (ref 0.5–2)
LACTATE BLDA-SCNC: 1.03 MMOL/L (ref 0.5–2)
LYMPHOCYTES # BLD AUTO: 0.91 10*3/MM3 (ref 0.7–3.1)
LYMPHOCYTES NFR BLD AUTO: 5.6 % (ref 19.6–45.3)
MAGNESIUM SERPL-MCNC: 1.7 MG/DL (ref 1.6–2.4)
MAGNESIUM SERPL-MCNC: 1.9 MG/DL (ref 1.6–2.4)
MCH RBC QN AUTO: 30.2 PG (ref 26.6–33)
MCHC RBC AUTO-ENTMCNC: 30.7 G/DL (ref 31.5–35.7)
MCV RBC AUTO: 98.6 FL (ref 79–97)
METHGB BLD QL: 0 % (ref 0–1.5)
METHGB BLD QL: 0.2 % (ref 0–1.5)
MODALITY: ABNORMAL
MODALITY: ABNORMAL
MONOCYTES # BLD AUTO: 0.77 10*3/MM3 (ref 0.1–0.9)
MONOCYTES NFR BLD AUTO: 4.7 % (ref 5–12)
NEUTROPHILS NFR BLD AUTO: 14.32 10*3/MM3 (ref 1.7–7)
NEUTROPHILS NFR BLD AUTO: 88.3 % (ref 42.7–76)
NOTE: ABNORMAL
NOTE: ABNORMAL
NRBC BLD AUTO-RTO: 0 /100 WBC (ref 0–0.2)
OXYHGB MFR BLDV: 85.3 % (ref 94–99)
OXYHGB MFR BLDV: 91.4 % (ref 94–99)
PCO2 BLDA: 36.9 MM HG (ref 35–45)
PCO2 BLDA: 37.2 MM HG (ref 35–45)
PH BLDA: 7.35 PH UNITS (ref 7.35–7.45)
PH BLDA: 7.36 PH UNITS (ref 7.35–7.45)
PHOSPHATE SERPL-MCNC: 3.4 MG/DL (ref 2.5–4.5)
PLATELET # BLD AUTO: 184 10*3/MM3 (ref 140–450)
PMV BLD AUTO: 10.4 FL (ref 6–12)
PO2 BLD: 170 MM[HG] (ref 0–500)
PO2 BLD: 207 MM[HG] (ref 0–500)
PO2 BLDA: 47.6 MM HG (ref 80–100)
PO2 BLDA: 62.1 MM HG (ref 80–100)
POTASSIUM BLDA-SCNC: 3.58 MMOL/L (ref 3.5–5)
POTASSIUM BLDA-SCNC: 3.72 MMOL/L (ref 3.5–5)
POTASSIUM SERPL-SCNC: 4 MMOL/L (ref 3.5–5.2)
RBC # BLD AUTO: 4.17 10*6/MM3 (ref 3.77–5.28)
SAO2 % BLDCOA: 85.6 % (ref 95–99)
SAO2 % BLDCOA: 91.8 % (ref 95–99)
SODIUM BLDA-SCNC: 140.8 MMOL/L (ref 136–146)
SODIUM BLDA-SCNC: 142.5 MMOL/L (ref 136–146)
SODIUM SERPL-SCNC: 142 MMOL/L (ref 136–145)
VANCOMYCIN SERPL-MCNC: 8.4 MCG/ML (ref 5–40)
WBC NRBC COR # BLD: 16.23 10*3/MM3 (ref 3.4–10.8)

## 2023-10-17 PROCEDURE — 99291 CRITICAL CARE FIRST HOUR: CPT | Performed by: INTERNAL MEDICINE

## 2023-10-17 PROCEDURE — 82330 ASSAY OF CALCIUM: CPT | Performed by: INTERNAL MEDICINE

## 2023-10-17 PROCEDURE — 87449 NOS EACH ORGANISM AG IA: CPT | Performed by: NURSE PRACTITIONER

## 2023-10-17 PROCEDURE — 94799 UNLISTED PULMONARY SVC/PX: CPT

## 2023-10-17 PROCEDURE — 83605 ASSAY OF LACTIC ACID: CPT | Performed by: INTERNAL MEDICINE

## 2023-10-17 PROCEDURE — 82805 BLOOD GASES W/O2 SATURATION: CPT | Performed by: NURSE PRACTITIONER

## 2023-10-17 PROCEDURE — 93306 TTE W/DOPPLER COMPLETE: CPT

## 2023-10-17 PROCEDURE — 25010000002 PIPERACILLIN SOD-TAZOBACTAM PER 1 G: Performed by: INTERNAL MEDICINE

## 2023-10-17 PROCEDURE — 80048 BASIC METABOLIC PNL TOTAL CA: CPT | Performed by: INTERNAL MEDICINE

## 2023-10-17 PROCEDURE — 25010000002 MAGNESIUM SULFATE 2 GM/50ML SOLUTION: Performed by: NURSE PRACTITIONER

## 2023-10-17 PROCEDURE — 94660 CPAP INITIATION&MGMT: CPT

## 2023-10-17 PROCEDURE — 93306 TTE W/DOPPLER COMPLETE: CPT | Performed by: INTERNAL MEDICINE

## 2023-10-17 PROCEDURE — 74018 RADEX ABDOMEN 1 VIEW: CPT

## 2023-10-17 PROCEDURE — 83735 ASSAY OF MAGNESIUM: CPT | Performed by: INTERNAL MEDICINE

## 2023-10-17 PROCEDURE — 85730 THROMBOPLASTIN TIME PARTIAL: CPT | Performed by: INTERNAL MEDICINE

## 2023-10-17 PROCEDURE — 85025 COMPLETE CBC W/AUTO DIFF WBC: CPT | Performed by: INTERNAL MEDICINE

## 2023-10-17 PROCEDURE — 84100 ASSAY OF PHOSPHORUS: CPT | Performed by: INTERNAL MEDICINE

## 2023-10-17 PROCEDURE — 36600 WITHDRAWAL OF ARTERIAL BLOOD: CPT | Performed by: NURSE PRACTITIONER

## 2023-10-17 PROCEDURE — 25010000002 VORICONAZOLE PER 10 MG: Performed by: INTERNAL MEDICINE

## 2023-10-17 PROCEDURE — 25010000002 PROPOFOL 10 MG/ML EMULSION: Performed by: INTERNAL MEDICINE

## 2023-10-17 PROCEDURE — 82948 REAGENT STRIP/BLOOD GLUCOSE: CPT

## 2023-10-17 PROCEDURE — 83036 HEMOGLOBIN GLYCOSYLATED A1C: CPT | Performed by: NURSE PRACTITIONER

## 2023-10-17 PROCEDURE — 94003 VENT MGMT INPAT SUBQ DAY: CPT

## 2023-10-17 PROCEDURE — 25810000003 SODIUM CHLORIDE 0.9 % SOLUTION: Performed by: INTERNAL MEDICINE

## 2023-10-17 PROCEDURE — 25010000002 VANCOMYCIN 5 G RECONSTITUTED SOLUTION: Performed by: INTERNAL MEDICINE

## 2023-10-17 PROCEDURE — 80202 ASSAY OF VANCOMYCIN: CPT | Performed by: INTERNAL MEDICINE

## 2023-10-17 PROCEDURE — 83050 HGB METHEMOGLOBIN QUAN: CPT | Performed by: NURSE PRACTITIONER

## 2023-10-17 PROCEDURE — 82375 ASSAY CARBOXYHB QUANT: CPT | Performed by: NURSE PRACTITIONER

## 2023-10-17 PROCEDURE — 25010000002 CALCIUM GLUCONATE-NACL 1-0.675 GM/50ML-% SOLUTION: Performed by: NURSE PRACTITIONER

## 2023-10-17 PROCEDURE — 87305 ASPERGILLUS AG IA: CPT | Performed by: NURSE PRACTITIONER

## 2023-10-17 PROCEDURE — 25010000002 HEPARIN (PORCINE) 25000-0.45 UT/250ML-% SOLUTION: Performed by: INTERNAL MEDICINE

## 2023-10-17 RX ORDER — MIDODRINE HYDROCHLORIDE 10 MG/1
10 TABLET ORAL EVERY 8 HOURS
Status: DISCONTINUED | OUTPATIENT
Start: 2023-10-17 | End: 2023-10-21

## 2023-10-17 RX ORDER — MAGNESIUM SULFATE HEPTAHYDRATE 40 MG/ML
2 INJECTION, SOLUTION INTRAVENOUS ONCE
Status: COMPLETED | OUTPATIENT
Start: 2023-10-17 | End: 2023-10-17

## 2023-10-17 RX ORDER — CALCIUM GLUCONATE 20 MG/ML
1000 INJECTION, SOLUTION INTRAVENOUS ONCE
Status: COMPLETED | OUTPATIENT
Start: 2023-10-17 | End: 2023-10-17

## 2023-10-17 RX ADMIN — VANCOMYCIN HYDROCHLORIDE 1250 MG: 5 INJECTION, POWDER, LYOPHILIZED, FOR SOLUTION INTRAVENOUS at 08:47

## 2023-10-17 RX ADMIN — WHITE PETROLATUM 57.7 %-MINERAL OIL 31.9 % EYE OINTMENT 1 APPLICATION: at 08:47

## 2023-10-17 RX ADMIN — WHITE PETROLATUM 57.7 %-MINERAL OIL 31.9 % EYE OINTMENT 1 APPLICATION: at 10:53

## 2023-10-17 RX ADMIN — WHITE PETROLATUM 57.7 %-MINERAL OIL 31.9 % EYE OINTMENT 1 APPLICATION: at 06:01

## 2023-10-17 RX ADMIN — CHLORHEXIDINE GLUCONATE 15 ML: 1.2 RINSE ORAL at 08:48

## 2023-10-17 RX ADMIN — WHITE PETROLATUM 57.7 %-MINERAL OIL 31.9 % EYE OINTMENT 1 APPLICATION: at 04:01

## 2023-10-17 RX ADMIN — PROPOFOL 20 MCG/KG/MIN: 10 INJECTION, EMULSION INTRAVENOUS at 03:03

## 2023-10-17 RX ADMIN — CALCIUM GLUCONATE 1000 MG: 20 INJECTION, SOLUTION INTRAVENOUS at 10:53

## 2023-10-17 RX ADMIN — MAGNESIUM SULFATE HEPTAHYDRATE 2 G: 40 INJECTION, SOLUTION INTRAVENOUS at 10:53

## 2023-10-17 RX ADMIN — FAMOTIDINE 20 MG: 10 INJECTION INTRAVENOUS at 08:48

## 2023-10-17 RX ADMIN — MIDODRINE HYDROCHLORIDE 10 MG: 10 TABLET ORAL at 12:51

## 2023-10-17 RX ADMIN — WHITE PETROLATUM 57.7 %-MINERAL OIL 31.9 % EYE OINTMENT 1 APPLICATION: at 02:25

## 2023-10-17 RX ADMIN — WHITE PETROLATUM 57.7 %-MINERAL OIL 31.9 % EYE OINTMENT 1 APPLICATION: at 01:00

## 2023-10-17 RX ADMIN — Medication 10 ML: at 20:10

## 2023-10-17 RX ADMIN — PIPERACILLIN AND TAZOBACTAM 3.38 G: 3; .375 INJECTION, POWDER, LYOPHILIZED, FOR SOLUTION INTRAVENOUS at 04:01

## 2023-10-17 RX ADMIN — PIPERACILLIN AND TAZOBACTAM 3.38 G: 3; .375 INJECTION, POWDER, LYOPHILIZED, FOR SOLUTION INTRAVENOUS at 20:09

## 2023-10-17 RX ADMIN — HEPARIN SODIUM 14 UNITS/KG/HR: 10000 INJECTION, SOLUTION INTRAVENOUS at 04:22

## 2023-10-17 RX ADMIN — Medication 10 ML: at 08:47

## 2023-10-17 RX ADMIN — DOCUSATE SODIUM 50MG AND SENNOSIDES 8.6MG 2 TABLET: 8.6; 5 TABLET, FILM COATED ORAL at 08:48

## 2023-10-17 RX ADMIN — VORICONAZOLE 430 MG: 200 INJECTION, POWDER, LYOPHILIZED, FOR SOLUTION INTRAVENOUS at 12:51

## 2023-10-17 RX ADMIN — PIPERACILLIN AND TAZOBACTAM 3.38 G: 3; .375 INJECTION, POWDER, LYOPHILIZED, FOR SOLUTION INTRAVENOUS at 11:46

## 2023-10-17 RX ADMIN — WHITE PETROLATUM 57.7 %-MINERAL OIL 31.9 % EYE OINTMENT 1 APPLICATION: at 11:46

## 2023-10-17 RX ADMIN — HEPARIN SODIUM 14 UNITS/KG/HR: 10000 INJECTION, SOLUTION INTRAVENOUS at 20:54

## 2023-10-17 RX ADMIN — CHLORHEXIDINE GLUCONATE 15 ML: 1.2 RINSE ORAL at 20:09

## 2023-10-17 RX ADMIN — DOCUSATE SODIUM 50MG AND SENNOSIDES 8.6MG 2 TABLET: 8.6; 5 TABLET, FILM COATED ORAL at 20:09

## 2023-10-17 RX ADMIN — MIDODRINE HYDROCHLORIDE 10 MG: 10 TABLET ORAL at 20:09

## 2023-10-17 NOTE — PLAN OF CARE
Goal Outcome Evaluation:  Patient has been on ACVC throughout the night, was able to wean FIO2 from 50% to 30%. Will assess for spontaneous breathing trial in the a.m.

## 2023-10-17 NOTE — PROGRESS NOTES
[facility]   Hospitalist Progress Note  Date: 10/17/2023  Patient Name: Chula Adkins  : 1940  MRN: [id number]  Date of admission: 10/16/2023      Subjective   Subjective     Chief Complaint: Unresponsive.  Hypoxemia.    Summary:   Chula Adkins is a 83 y.o. female with past medical history of diet-controlled diabetes mellitus and hypertension, restless leg syndrome, chronic pain syndrome on Percocet by pain management, hyperlipidemia and hypothyroidism.  History is obtained by patient and  at bedside as patient is intubated and unresponsive.  Patient was last seen well around 2:30 AM In Her Kitchen Talking to her cat.  No complaints of chest pain, fever, cough, shortness of air, nausea, vomiting, diarrhea or abdominal pain and no urinary complaints per .  Around 6 AM she was found unresponsive by her .  EMS found her to be hypoxic with O2 sat of 80%.  Work-up in the ED showed 64% saturation patient was hypothermic.  Patient was intubated in ED with no gag reflex.  There was Chapstick found in her throat.  Afterwards she dropped her blood pressure to 77/59 requiring 2 L of fluid and IV Levophed.  Her temp was 91.7 started on warming blanket.  ABG showed pH of 7.19, PCO2 55, PO2 of 233, bicarb of 20, 99% saturation on 100% FiO2.  Troponin T is 15.  Creatinine is 1.2 baseline not available.  BUN of 54.  Lactic acid of 5.9.  TSH free T4 within normal range.  Hemoglobin of 9.8.  UA is negative.  EKG showed normal sinus rhythm.  CT scan of C-spine did not show any fractures it did show gas in bilateral subclavian and brachiocephalic vein.  CT head negative.  CT of the chest showed left upper lobe segmental and left lower lobe subsegmental PE with bilateral lower lobe consolidations more on the righ possibly aspiration.  Gas in the band is improving.  Her sputum and blood culture is pending.  Hospitalist has been called to admit her for further evaluation.  Per   patient has not been sedentary and her last surgical procedure sounded like a repair of rectal prolapse about 2 months ago.  When patient got to the floor she started to get more agitated.  She was restrained and started on heparin drip.  Extubated on October 17 to nasal cannula.    Interval Followup:   Blood pressure soft.  Off Levophed.  Patient on nasal cannula with 93% saturation.  Patient responding appropriately to vocal commands  Tolerating tube feed well.  Out of restraints  Not talking much.  Good urine output.  Positive fluid balance.    Review of Systems   All systems were reviewed and negative except for: Summary and interval follow-up    Objective   Objective     Vitals:   Temp:  [100 °F (37.8 °C)-100.6 °F (38.1 °C)] 100.6 °F (38.1 °C)  Heart Rate:  [] 85  Resp:  [15-18] 17  BP: ()/(45-90) 105/65  Flow (L/min):  [2] 2  FiO2 (%):  [30 %-50 %] 30 %  Physical Exam    Constitutional: Lethargic, no acute distress   Eyes: Pupils equal, sclerae anicteric, no conjunctival injection   HENT: NCAT, mucous membranes dry   Neck: Supple, no thyromegaly, no lymphadenopathy, trachea midline   Respiratory: Occasional wheezing, decreased to auscultation bilaterally, nonlabored respirations    Cardiovascular: RRR, no murmurs, rubs, or gallops, palpable pedal pulses bilaterally   Gastrointestinal: Positive  core track, positive bowel sounds, soft, nontender, nondistended   Musculoskeletal: No bilateral ankle edema, no clubbing or cyanosis to extremities   Psychiatric: Appropriate affect, cooperative   Neurologic: Oriented x 0, neuro not done as patient appears to be confused.  Speech not clear yet skin: No rashes     Result Review    Result Review:  I have personally reviewed the results for the past 24 hours and agree with these findings:  [x]  Laboratory  [x]  Microbiology  [x]  Radiology  [x]  EKG/Telemetry   []  Cardiology/Vascular   []  Pathology  [x]  Old records  [x]  Other: Medications    Assessment &  Plan   Assessment / Plan     Assessment:  Severe sepsis with shock.  Improved   Acute hypoxemic and hypercapnic respiratory failure.No home oxygen use.Due to pneumonia and mucous plugging.  Extubated October 17.  Right more than left basilar pneumonia likely aspiration.MSSA.S/p bronchoscopy.  Left upper lobe segmental and left lower lobe subsegmental acute PE.  Unprovoked first episode.    Sepsis.  Lactic acidosis, hypothermia and hypotension.  Improving  Renal insufficiency.  Baseline not known.  Gas in bilateral subclavian and brachiocephalic vein.  Likely from intravenous intervention in the ED or by EMS.  Improving  Chronic pain syndrome on Percocet.  Although it is listed as an allergy.  Restless leg syndrome.  Diabetes mellitus diet controlled.  Hypertension diet controlled.  Hypothyroidism.  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     Plan:   IV fluid  Off Levophed.  Midodrine  ENT consulted  .           Started on IV itraconazole by ICU  Discussed with pulmonary appreciate input.  S/p bronchoscopy  IV  Zosyn.  procalcitonin.  Negative  Check MRSA PCR.  Negative.  Vanco DC'd  Await culture data.  Bronch PCR positive for Staph aureus  Await old records.  IV heparin to continue.  If no procedure planned we will switch to Eliquis versus Lovenox  Off warming blanket.  Resume appropriate home medication.  Bronchopulmonary hygiene and bronchodilator protocol.  Discussed with vascular no intervention needed for gas in her subclavian and brachiocephalic veins.  It is already improving on second CT scan.  IV Pepcid  Check 2D echo.  MRI of the brain noted with left maxillary polyp versus mycetoma.  EEG no epileptiform discharges.  Diffuse encephalopathy.   As needed fentanyl.  NG.  Tube feed as per dietitian  Speech evaluation  PT OT  Continue Eng catheter  Continue CCU care    Discussed plan with RN and lots of family member at bedside.    DVT prophylaxis:  Medical and  mechanical DVT prophylaxis orders are present.    CODE STATUS:   Code Status (Patient has no pulse and is not breathing): CPR (Attempt to Resuscitate)  Medical Interventions (Patient has pulse or is breathing): Full Support      Part of this note may be an electronic transcription/translation of spoken language to printed text using the Dragon Dictation System.   Critical care time spent 31 minutes.    Electronically signed by Husam Rey MD, 10/17/23, 4:17 PM EDT.

## 2023-10-17 NOTE — CONSULTS
"Nutrition Services    Patient Name: Chula Adkins  YOB: 1940  MRN: [id number]  Admission date: 10/16/2023      CLINICAL NUTRITION ASSESSMENT      Reason for Assessment  Physician consult, EN     H&P:    History reviewed. No pertinent past medical history.     Current Problems:   Active Hospital Problems    Diagnosis     **Acute respiratory failure with hypoxia     Acute respiratory failure with hypoxia and hypercapnia     [place] coma scale total score 3-8     Lactic acidosis         Nutrition/Diet History         Narrative     83-year-old female admitted with acute respiratory failure with hypoxia and sepsis.  Patient intubated to preserve airway.  Patient to be extubated this day.  Concern for long-term aspiration precipitating this event.  Physician has ordered core track NG feedings until speech therapy can assess and work with patient related to swallowing.  Nursing notes skin intact with a Yariel score = 14.  No BM documented this admission.  Patient has orders for Sandra-Colace, MiraLAX, Dulcolax suppository.  Patient is n.p.o. until speech can evaluate.  BMI is elevated for age.  Consistent with class II obesity.  Patient is 176% of ideal body weight 120 pounds 9 ounces.  No weight history available to assess pattern of weight loss.  Recommend the following enteral nutrition prescription:  Fibersource HN @ 65 ml/hr, via Cortrak NG tube, per continuous pump. Flush tube with 120 ml free water every 3 hours.       Anthropometrics        Current Height, Weight Height: 162.6 cm (64\")  Weight: 96.4 kg (212 lb 8.4 oz)   Current BMI Body mass index is 36.48 kg/m².       Weight Hx  Wt Readings from Last 30 Encounters:   10/17/23 0600 96.4 kg (212 lb 8.4 oz)   10/16/23 0751 99 kg (218 lb 4.1 oz)            Wt Change Observation Unable to assess due to lack of information     Estimated/Assessed Needs       Energy Requirements Stop any nutrient needs calculated using adjusted body weight 157 pounds " "/ 71 kg   EST Needs (kcal/day) 25 kcals per kilogram = 1775 [place]       Protein Requirements    EST Daily Needs (g/day) 1.0 g/kg = 71 g of protein       Fluid Requirements     Estimated Needs (mL/day) 30 mL/kg = 2130 mL (8 to 9 cups)     Labs/Medications         Pertinent Labs Reviewed.  Received 1000 mg intravenous calcium gluconate this day.   Results from last 7 days   Lab Units 10/17/23  1012 10/17/23  0045 10/16/23  0807   SODIUM mmol/L  --  142 138   SODIUM, ARTERIAL mmol/L 142.5  --   --    POTASSIUM mmol/L  --  4.0 4.8   CHLORIDE mmol/L  --  112* 107   CO2 mmol/L  --  20.3* 18.4*   BUN mg/dL  --  50* 54*   CREATININE mg/dL  --  1.31* 1.20*   CALCIUM mg/dL  --  8.4* 8.0*   BILIRUBIN mg/dL  --   --  0.2   ALK PHOS U/L  --   --  60   ALT (SGPT) U/L  --   --  11   AST (SGOT) U/L  --   --  10   GLUCOSE mg/dL  --  90 121*   GLUCOSE, ARTERIAL mg/dL 103*  --   --      Results from last 7 days   Lab Units 10/17/23  0045 10/16/23  1355 10/16/23  0807   MAGNESIUM mg/dL 1.7 1.9 1.8  1.6   PHOSPHORUS mg/dL 3.4  --  4.1   HEMOGLOBIN g/dL 12.6  --  9.8*   HEMATOCRIT % 41.1  --  32.0*     No results found for: \"COVID19\"  No results found for: \"HGBA1C\"      Pertinent Medications Reviewed.     Current Nutrition Orders & Evaluation of Intake       Oral Nutrition     Current PO Diet NPO Diet NPO Type: Strict NPO   Supplement Orders Placed This Encounter      Place Feeding Tube Per Trigence System       Malnutrition Severity Assessment                Nutrition Diagnosis         Nutrition Dx Problem 1 Swallowing difficulty related to decreased ability to consume sufficient energy as evidenced by NPO       Nutrition Intervention         Fibersource HN @ 65 ml/hr, via Cortrak NG tube, per continuous pump. Flush tube with 120 ml free water every 3 hours.    Provides 1716 calories, 77 grams protein, 2118 ml free water     Medical Nutrition Therapy/Nutrition Education          Learner     Readiness Patient  Education not appropriate at " this time     Method     Response N/A  N/A     Monitor/Evaluation        Monitor Per protocol, Pertinent labs, EN delivery/tolerance, GI status, POC/GOC, Swallow function, RFS, Diet advancement       Nutrition Discharge Plan         To be determined       Electronically signed by:  Taylor Canchola RD  10/17/23 11:12 EDT

## 2023-10-17 NOTE — PROGRESS NOTES
Pulmonary / Critical Care Progress Note      Patient Name: Chula Adkins  : 1940  MRN: [id number]  Attending:  Husam Rey MD   Date of admission: 10/16/2023    Subjective   Subjective   Patient critically ill with respiratory failure, pneumonia, pulmonary embolism    Over past 24 hours: Patient admitted to the intensive care unit on mechanical ventilator, found to have pulmonary embolism, aspiration pneumonia, on heparin drip, had bedside bronchoscopy, MRI of brain, required low-dose Levophed    No acute events overnight    This morning  Patient is sleepy however does follow some commands  On levo at 0.01 g/kg/min  Tolerating pressure support 10/5 on 30% FiO2  Sedation is currently on standby  Bronchoscopy pneumonia panel shows MSSA  Sputum culture with gram-positive and gram-negative bacteria  Calcium low    Review of Systems  Unable to obtain patient on mechanical ventilator      Objective   Objective     Vitals:   Vital signs for last 24 hours:  Temp:  [91.9 °F (33.3 °C)-100.6 °F (38.1 °C)] 100.6 °F (38.1 °C)  Heart Rate:  [] 88  Resp:  [16-22] 18  BP: ()/(45-95) 99/63  FiO2 (%):  [30 %-50 %] 30 %    Intake/Output last 3 shifts:  I/O last 3 completed shifts:  In: 3198.3 [I.V.:2198.3; IV Piggyback:1000]  Out: 1700 [Urine:1700]  Intake/Output this shift:  No intake/output data recorded.    Vent settings for last 24 hours:  FiO2 (%):  [30 %-50 %] 30 %  S RR:  [20] 20  PEEP/CPAP (cm H2O):  [5 cm H20] 5 cm H20  IL SUP:  [10 cm H20] 10 cm H20  MAP (cm H2O):  [7.7-11] 8.8    Physical Exam   Vital Signs Reviewed   Critically ill, on invasive mechanical ventilator  HEENT:  PERRL, EOMI. ET tube and OG tube in place  Chest: Bilateral coarse mechanical ventilator breath sounds, no work of breathing noted, synchronous with ventilator   CV: RRR, no MGR, pulses 2+, equal.  Abd:  Soft, NT, ND, + BS, no HSM  EXT:  no clubbing, no cyanosis, no edema  Neuro:  A&Ox1, patient follows simple commands,  moves all 4 extremities, on mechanical vent  Skin: No rashes or lesions noted        Result Review    Result Review:  I have personally reviewed the results from the time of this admission to 10/17/2023 10:32 EDT and agree with these findings:  [x]  Laboratory  [x]  Microbiology  [x]  Radiology  []  EKG/Telemetry   [x]  Cardiology/Vascular   []  Pathology  []  Old records  []  Other:  Most notable findings include:   MRI Brain Without Contrast    Result Date: 10/17/2023    1. No acute brain abnormality is seen. No acute infarct. No acute intracranial hemorrhage. 2. There may be mild-to-moderate chronic small vessel ischemia/infarction. 3. Slight motion artifact obscures detail on some of the sequences. 4. There is an abnormal MRI appearance of the left maxillary antrum and the left nasal cavity, as discussed.  One differential consideration for the findings would be an antrochoanal polyp (ACP) and an associated fungus ball (mycetoma).  5. The other findings are as detailed above.    Please note that portions of this note were completed with a voice recognition program.  JACKSON AMOR JR, MD       Electronically Signed and Approved By: JACKSON AMOR JR, MD on 10/17/2023 at 5:30              CT Chest With Contrast Diagnostic    Result Date: 10/16/2023    1. Worsening consolidation in the lower lungs. 2. Suggested pulmonary embolus in subsegmental left lower lobe pulmonary arterial branch and probable segmental left upper lobe branch. 3. Previously noted air within the venous vascular structures in the upper chest area is resolving. 4. Coronary artery calcification.     FRAN PATRICIO MD       Electronically Signed and Approved By: FRAN PATRICIO MD on 10/16/2023 at 11:59             CT Chest Without Contrast Diagnostic    Result Date: 10/16/2023     1. Dependent airspace opacities in the upper and lower lobes that likely represents dependent atelectasis.  Aspiration could appear similar  2. Gastric tube in the distal  esophagus.  Recommend advancing the tube 10 cm to ensure that the tip and side port are in the stomach      SENAIT BLAS MD       Electronically Signed and Approved By: SENAIT BLAS MD on 10/16/2023 at 9:29             CT Cervical Spine Without Contrast    Result Date: 10/16/2023     1. No evidence of cervical spine fracture  2. Intravenous gas in the bilateral subclavian and brachiocephalic veins     SENAIT BLAS MD       Electronically Signed and Approved By: SENAIT BLAS MD on 10/16/2023 at 9:19             CT Head Without Contrast    Result Date: 10/16/2023     1. No intracranial hemorrhage  2. No CT changes of major vascular territory brain ischemia or global anoxia at this time  3. 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 Nasogastric tube tip is around the GE junction and needs to be advanced. 2. Prominence of markings within the interstitium of both lungs which is nonspecific but may be due to inflammatory infectious process.  There is some atelectasis or scarring in the left mid chest. 3. There is some nonspecific gaseous distention of bowel in what is visualized of the abdomen.       FRAN PATRICIO MD       Electronically Signed and Approved By: FRAN PATRICIO MD on 10/16/2023 at 8:22                Lab 10/17/23  1012 10/17/23  0045 10/16/23  0807   WBC  --  16.23* 5.38   HEMOGLOBIN  --  12.6 9.8*   HEMATOCRIT  --  41.1 32.0*   PLATELETS  --  184 154   SODIUM  --  142 138   SODIUM, ARTERIAL 142.5  --   --    POTASSIUM  --  4.0 4.8   CHLORIDE  --  112* 107   CO2  --  20.3* 18.4*   BUN  --  50* 54*   CREATININE  --  1.31* 1.20*   GLUCOSE  --  90 121*   GLUCOSE, ARTERIAL 103*  --   --    CALCIUM  --  8.4* 8.0*   PHOSPHORUS  --  3.4 4.1   TOTAL PROTEIN  --   --  4.8*   ALBUMIN  --   --  3.1*   GLOBULIN  --   --  1.7          Assessment & Plan   Assessment / Plan     Active Hospital Problems:  Active Hospital Problems    Diagnosis     **Acute respiratory failure with hypoxia     Acute respiratory failure with hypoxia and hypercapnia     Charmaine coma scale total score 3-8     Lactic acidosis          Impression:  Acute hypoxic and hypercapnic respiratory failure  Altered mental status, toxic metabolic encephalopathy  MSSA pneumonia  Concern for aspiration  Pulmonary embolism  History of hypothyroidism  Restless leg syndrome  Chronic pain syndrome    Plan:  -Patient tolerating pressure support  -Check ABG, if meets criteria okay to extubate to nasal cannula  -Concern of aspiration, will have speech therapy evaluate patient  -Place core track, initiate tube feeds per dietitian   -Continue heparin drip for pulmonary embolism  -Echocardiogram pending  -Discontinue vancomycin, continue Zosyn day 2  -Follow BAL culture  -MRI of brain showed an area of concern of the left maxillary antrum differential polyp versus fungus ball.  We will have ENT evaluate patient.  Check beta D glucan and Aspergillus antigen  -Start fluconazole  -EEG showed encephalopathy 10/16  -Continue Levophed to maintain MAP at 65  -Okay to discontinue IV fluids  -Trend renal function, replace magnesium and calcium IV today  -Can likely discontinue Eng catheter tomorrow  -Peptic ulcer prophylaxis Pepcid  -Bowel regimen in place    DVT prophylaxis: Heparin drip  Medical and mechanical DVT prophylaxis orders are present.    CODE STATUS:      Rebekah JACOBSON APRN, spent 15 minutes critical care time in accordance to split shared billing.    Patient is critically ill in ICU with acute hypoxic and hypercapnic respiratory failure, possible pneumonia, aspiration, altered mental status, pulmonary embolism.  We spent 33 minutes of critical care time, excluding any procedure notes, in review, analysis, obtaining history and physical, formulating care plan, and I led  multi-disciplinary critical care rounds with bedside nurse, respiratory therapist, clinical pharmacist and other allied services. I have discussed the case with primary service and other consultants as well. I, Dr Francis, spent 18 mins of critical care time according to split shared critical care billing guidelines.     Electronically signed by Chong Francis MD, 10/17/23, 2:04 PM EDT.

## 2023-10-17 NOTE — PLAN OF CARE
Goal Outcome Evaluation:  Plan of Care Reviewed With: patient        Progress: no change  Outcome Evaluation: Pt was extubated to 2L nasal cannula this morning. Pt still sleepy, put pt on bipap 12/8 30% and seems to be tolerating well. Will continue to monitor.

## 2023-10-17 NOTE — NURSING NOTE
Visited patient, her  and niece in the ICU. She remains on the vent. PS. Plans to extubate today. She follows commands (blinks her eyes, moves feet, squeezes hand, nods head. Bilat soft wrist restrains remain on at this time to prevent pulling of lines tubes and drains.    tells me he remains hopeful, his wife will make a full recovery. He does not want to change code status at this time.   Patients daughters to visit today  Palliative care will continue to follow and support as needed    Shikha MINOR RN, BSN  Palliative Care

## 2023-10-17 NOTE — PLAN OF CARE
Goal Outcome Evaluation:      Goals of care discussed with: Patient and family    Outcome: Pt has had purposeful movement all night. NSR 90's, Pt was switched to PS 10/5 this am around 0600. Propofol was turned off @0500. Pt is replying to questions with head shakes, opening eyes, and has continued purposeful movement. BP have improved throughout the night. Levo @ 0.08. Heparin @ 14. NS @100. MRI was obtained last night.

## 2023-10-18 PROBLEM — J96.02 ACUTE RESPIRATORY FAILURE WITH HYPOXIA AND HYPERCAPNIA: Status: RESOLVED | Noted: 2023-10-16 | Resolved: 2023-10-18

## 2023-10-18 PROBLEM — E87.20 LACTIC ACIDOSIS: Status: RESOLVED | Noted: 2023-10-16 | Resolved: 2023-10-18

## 2023-10-18 PROBLEM — J96.01 ACUTE RESPIRATORY FAILURE WITH HYPOXIA AND HYPERCAPNIA: Status: RESOLVED | Noted: 2023-10-16 | Resolved: 2023-10-18

## 2023-10-18 PROBLEM — R40.2430 GLASGOW COMA SCALE TOTAL SCORE 3-8: Status: RESOLVED | Noted: 2023-10-16 | Resolved: 2023-10-18

## 2023-10-18 LAB
ANION GAP SERPL CALCULATED.3IONS-SCNC: 8.7 MMOL/L (ref 5–15)
APTT PPP: 109.8 SECONDS (ref 78–95.9)
ARTERIAL PATENCY WRIST A: ABNORMAL
BASE EXCESS BLDA CALC-SCNC: -5.9 MMOL/L (ref -2–2)
BASOPHILS # BLD AUTO: 0.06 10*3/MM3 (ref 0–0.2)
BASOPHILS NFR BLD AUTO: 0.6 % (ref 0–1.5)
BDY SITE: ABNORMAL
BUN SERPL-MCNC: 32 MG/DL (ref 8–23)
BUN/CREAT SERPL: 30.8 (ref 7–25)
BURR CELLS BLD QL SMEAR: NORMAL
CA-I BLDA-SCNC: 1.25 MMOL/L (ref 1.13–1.32)
CALCIUM SPEC-SCNC: 8.9 MG/DL (ref 8.6–10.5)
CHLORIDE BLDA-SCNC: 111 MMOL/L (ref 98–106)
CHLORIDE SERPL-SCNC: 114 MMOL/L (ref 98–107)
CO2 SERPL-SCNC: 20.3 MMOL/L (ref 22–29)
COHGB MFR BLD: 0.3 % (ref 0–1.5)
CREAT SERPL-MCNC: 1.04 MG/DL (ref 0.57–1)
D-LACTATE SERPL-SCNC: 1.7 MMOL/L (ref 0.5–2)
DEPRECATED RDW RBC AUTO: 45.4 FL (ref 37–54)
EGFRCR SERPLBLD CKD-EPI 2021: 54.1 ML/MIN/1.73
EOSINOPHIL # BLD AUTO: 0.45 10*3/MM3 (ref 0–0.4)
EOSINOPHIL NFR BLD AUTO: 4.6 % (ref 0.3–6.2)
ERYTHROCYTE [DISTWIDTH] IN BLOOD BY AUTOMATED COUNT: 13 % (ref 12.3–15.4)
FHHB: 9.6 % (ref 0–5)
GAS FLOW AIRWAY: ABNORMAL L/MIN
GLUCOSE BLDA-MCNC: 140 MG/DL (ref 65–99)
GLUCOSE BLDC GLUCOMTR-MCNC: 121 MG/DL (ref 70–99)
GLUCOSE SERPL-MCNC: 133 MG/DL (ref 65–99)
HCO3 BLDA-SCNC: 18.7 MMOL/L (ref 22–26)
HCT VFR BLD AUTO: 35.2 % (ref 34–46.6)
HGB BLD-MCNC: 10.9 G/DL (ref 12–15.9)
HGB BLDA-MCNC: 11.6 G/DL (ref 11.7–14.6)
IMM GRANULOCYTES # BLD AUTO: 0.02 10*3/MM3 (ref 0–0.05)
IMM GRANULOCYTES NFR BLD AUTO: 0.2 % (ref 0–0.5)
INHALED O2 CONCENTRATION: 21 %
LACTATE BLDA-SCNC: 0.99 MMOL/L (ref 0.5–2)
LYMPHOCYTES # BLD AUTO: 0.81 10*3/MM3 (ref 0.7–3.1)
LYMPHOCYTES NFR BLD AUTO: 8.2 % (ref 19.6–45.3)
MAGNESIUM SERPL-MCNC: 2.2 MG/DL (ref 1.6–2.4)
MCH RBC QN AUTO: 29.6 PG (ref 26.6–33)
MCHC RBC AUTO-ENTMCNC: 31 G/DL (ref 31.5–35.7)
MCV RBC AUTO: 95.7 FL (ref 79–97)
METHGB BLD QL: 0 % (ref 0–1.5)
MODALITY: ABNORMAL
MONOCYTES # BLD AUTO: 0.62 10*3/MM3 (ref 0.1–0.9)
MONOCYTES NFR BLD AUTO: 6.3 % (ref 5–12)
NEUTROPHILS NFR BLD AUTO: 7.87 10*3/MM3 (ref 1.7–7)
NEUTROPHILS NFR BLD AUTO: 80.1 % (ref 42.7–76)
NOTE: ABNORMAL
NRBC BLD AUTO-RTO: 0 /100 WBC (ref 0–0.2)
OXYHGB MFR BLDV: 90.1 % (ref 94–99)
PCO2 BLDA: 33.8 MM HG (ref 35–45)
PH BLDA: 7.36 PH UNITS (ref 7.35–7.45)
PHOSPHATE SERPL-MCNC: 2 MG/DL (ref 2.5–4.5)
PLAT MORPH BLD: NORMAL
PLATELET # BLD AUTO: 133 10*3/MM3 (ref 140–450)
PMV BLD AUTO: 10.9 FL (ref 6–12)
PO2 BLD: 280 MM[HG] (ref 0–500)
PO2 BLDA: 58.7 MM HG (ref 80–100)
POTASSIUM BLDA-SCNC: 3.55 MMOL/L (ref 3.5–5)
POTASSIUM SERPL-SCNC: 3.7 MMOL/L (ref 3.5–5.2)
RBC # BLD AUTO: 3.68 10*6/MM3 (ref 3.77–5.28)
SAO2 % BLDCOA: 90.4 % (ref 95–99)
SODIUM BLDA-SCNC: 141.5 MMOL/L (ref 136–146)
SODIUM SERPL-SCNC: 143 MMOL/L (ref 136–145)
WBC MORPH BLD: NORMAL
WBC NRBC COR # BLD: 9.83 10*3/MM3 (ref 3.4–10.8)

## 2023-10-18 PROCEDURE — 99291 CRITICAL CARE FIRST HOUR: CPT | Performed by: INTERNAL MEDICINE

## 2023-10-18 PROCEDURE — 25010000002 PIPERACILLIN SOD-TAZOBACTAM PER 1 G: Performed by: INTERNAL MEDICINE

## 2023-10-18 PROCEDURE — 25010000002 CEFAZOLIN IN DEXTROSE 2-4 GM/100ML-% SOLUTION: Performed by: INTERNAL MEDICINE

## 2023-10-18 PROCEDURE — 36600 WITHDRAWAL OF ARTERIAL BLOOD: CPT | Performed by: INTERNAL MEDICINE

## 2023-10-18 PROCEDURE — 80048 BASIC METABOLIC PNL TOTAL CA: CPT | Performed by: INTERNAL MEDICINE

## 2023-10-18 PROCEDURE — 92610 EVALUATE SWALLOWING FUNCTION: CPT

## 2023-10-18 PROCEDURE — 97161 PT EVAL LOW COMPLEX 20 MIN: CPT

## 2023-10-18 PROCEDURE — 25010000002 CEFAZOLIN 1-4 GM/50ML-% SOLUTION: Performed by: INTERNAL MEDICINE

## 2023-10-18 PROCEDURE — 85730 THROMBOPLASTIN TIME PARTIAL: CPT | Performed by: INTERNAL MEDICINE

## 2023-10-18 PROCEDURE — 84100 ASSAY OF PHOSPHORUS: CPT | Performed by: INTERNAL MEDICINE

## 2023-10-18 PROCEDURE — 85007 BL SMEAR W/DIFF WBC COUNT: CPT | Performed by: INTERNAL MEDICINE

## 2023-10-18 PROCEDURE — 83735 ASSAY OF MAGNESIUM: CPT | Performed by: INTERNAL MEDICINE

## 2023-10-18 PROCEDURE — 85025 COMPLETE CBC W/AUTO DIFF WBC: CPT | Performed by: INTERNAL MEDICINE

## 2023-10-18 PROCEDURE — 25010000002 ENOXAPARIN PER 10 MG: Performed by: INTERNAL MEDICINE

## 2023-10-18 PROCEDURE — 83050 HGB METHEMOGLOBIN QUAN: CPT | Performed by: INTERNAL MEDICINE

## 2023-10-18 PROCEDURE — 94799 UNLISTED PULMONARY SVC/PX: CPT

## 2023-10-18 PROCEDURE — 97165 OT EVAL LOW COMPLEX 30 MIN: CPT

## 2023-10-18 PROCEDURE — 82805 BLOOD GASES W/O2 SATURATION: CPT | Performed by: INTERNAL MEDICINE

## 2023-10-18 PROCEDURE — 25010000002 VORICONAZOLE PER 10 MG: Performed by: INTERNAL MEDICINE

## 2023-10-18 PROCEDURE — 82948 REAGENT STRIP/BLOOD GLUCOSE: CPT

## 2023-10-18 PROCEDURE — 94660 CPAP INITIATION&MGMT: CPT

## 2023-10-18 PROCEDURE — 25810000003 SODIUM CHLORIDE 0.9 % SOLUTION: Performed by: PHYSICIAN ASSISTANT

## 2023-10-18 PROCEDURE — 83605 ASSAY OF LACTIC ACID: CPT | Performed by: INTERNAL MEDICINE

## 2023-10-18 PROCEDURE — 82375 ASSAY CARBOXYHB QUANT: CPT | Performed by: INTERNAL MEDICINE

## 2023-10-18 RX ORDER — FAMOTIDINE 20 MG/1
20 TABLET, FILM COATED ORAL NIGHTLY
Status: DISCONTINUED | OUTPATIENT
Start: 2023-10-18 | End: 2023-10-21

## 2023-10-18 RX ORDER — CEFAZOLIN SODIUM 2 G/100ML
2000 INJECTION, SOLUTION INTRAVENOUS EVERY 8 HOURS SCHEDULED
Status: DISCONTINUED | OUTPATIENT
Start: 2023-10-18 | End: 2023-10-22

## 2023-10-18 RX ORDER — ROPINIROLE 1 MG/1
3 TABLET, FILM COATED ORAL NIGHTLY
Status: DISCONTINUED | OUTPATIENT
Start: 2023-10-18 | End: 2023-10-19

## 2023-10-18 RX ORDER — ENOXAPARIN SODIUM 100 MG/ML
1 INJECTION SUBCUTANEOUS EVERY 12 HOURS
Status: DISCONTINUED | OUTPATIENT
Start: 2023-10-18 | End: 2023-10-22

## 2023-10-18 RX ORDER — CEFAZOLIN SODIUM 1 G/50ML
1000 INJECTION, SOLUTION INTRAVENOUS EVERY 8 HOURS
Status: DISCONTINUED | OUTPATIENT
Start: 2023-10-18 | End: 2023-10-18

## 2023-10-18 RX ORDER — FENTANYL/ROPIVACAINE/NS/PF 2-625MCG/1
15 PLASTIC BAG, INJECTION (ML) EPIDURAL ONCE
Status: COMPLETED | OUTPATIENT
Start: 2023-10-18 | End: 2023-10-18

## 2023-10-18 RX ADMIN — CEFAZOLIN SODIUM 2000 MG: 2 INJECTION, SOLUTION INTRAVENOUS at 23:34

## 2023-10-18 RX ADMIN — Medication 10 ML: at 22:57

## 2023-10-18 RX ADMIN — Medication 10 ML: at 08:41

## 2023-10-18 RX ADMIN — MIDODRINE HYDROCHLORIDE 10 MG: 10 TABLET ORAL at 04:48

## 2023-10-18 RX ADMIN — CEFAZOLIN SODIUM 1000 MG: 1 INJECTION, SOLUTION INTRAVENOUS at 12:03

## 2023-10-18 RX ADMIN — VORICONAZOLE 430 MG: 200 INJECTION, POWDER, LYOPHILIZED, FOR SOLUTION INTRAVENOUS at 00:27

## 2023-10-18 RX ADMIN — ROPINIROLE HYDROCHLORIDE 3 MG: 1 TABLET, FILM COATED ORAL at 22:56

## 2023-10-18 RX ADMIN — ENOXAPARIN SODIUM 100 MG: 100 INJECTION SUBCUTANEOUS at 12:04

## 2023-10-18 RX ADMIN — CEFAZOLIN SODIUM 2000 MG: 2 INJECTION, SOLUTION INTRAVENOUS at 14:40

## 2023-10-18 RX ADMIN — FAMOTIDINE 20 MG: 20 TABLET, FILM COATED ORAL at 22:56

## 2023-10-18 RX ADMIN — DOCUSATE SODIUM 50MG AND SENNOSIDES 8.6MG 2 TABLET: 8.6; 5 TABLET, FILM COATED ORAL at 23:00

## 2023-10-18 RX ADMIN — DOCUSATE SODIUM 50MG AND SENNOSIDES 8.6MG 2 TABLET: 8.6; 5 TABLET, FILM COATED ORAL at 08:41

## 2023-10-18 RX ADMIN — FAMOTIDINE 20 MG: 10 INJECTION INTRAVENOUS at 08:41

## 2023-10-18 RX ADMIN — PIPERACILLIN AND TAZOBACTAM 3.38 G: 3; .375 INJECTION, POWDER, LYOPHILIZED, FOR SOLUTION INTRAVENOUS at 04:48

## 2023-10-18 RX ADMIN — ENOXAPARIN SODIUM 100 MG: 100 INJECTION SUBCUTANEOUS at 23:25

## 2023-10-18 RX ADMIN — MIDODRINE HYDROCHLORIDE 10 MG: 10 TABLET ORAL at 12:04

## 2023-10-18 RX ADMIN — CHLORHEXIDINE GLUCONATE 15 ML: 1.2 RINSE ORAL at 08:42

## 2023-10-18 RX ADMIN — POTASSIUM PHOSPHATE, MONOBASIC POTASSIUM PHOSPHATE, DIBASIC 15 MMOL: 224; 236 INJECTION, SOLUTION, CONCENTRATE INTRAVENOUS at 08:41

## 2023-10-18 RX ADMIN — MIDODRINE HYDROCHLORIDE 10 MG: 10 TABLET ORAL at 23:34

## 2023-10-18 NOTE — SIGNIFICANT NOTE
ST consult noted.  SLP attempted to assess x 2 today and patient not able to sustain adequate level of alertness.   Currently has Cortrak in place.     SLP will re-attempt again later today.

## 2023-10-18 NOTE — NURSING NOTE
Collaborated with RN.  Patients  and 2 daughters at bedside. Patient is alert and oriented. She falls to sleep easily. She has 02 via NC. Has cortrak for artificial nutrition and f/c for accurate I & O. SLP has attempt to evaluate for diet but she was too lethargic at the time.   I discuss code status and the lack of a living will with the  and both daughter. They all agree they are not ready to make any changes. She will remain a full code with all interventions. They have said they will discuss completing a living will tomorrow  Palliative care to monitor and support as needed    Shikha MINOR RN, BSN  Palliative Care

## 2023-10-18 NOTE — PLAN OF CARE
Goal Outcome Evaluation:         Goals of care discussed with: Patient and family    Outcome: Pt is A&Ox4, NSR, NC 2L. Pt was very lethargic on bipap at the beginning of my shift but able to follow commands. Around 0300, pt was more alert. Bipap was removed to hear what patient was saying, she was communicating well, ABG was obtained and pt was placed on 2L NC. Pt able to answer all of my questions and has been communicating with her .

## 2023-10-18 NOTE — THERAPY EVALUATION
Acute Care - Speech Language Pathology   Swallow Initial Evaluation  Erika     Patient Name: Chula Adkins  : 1940  MRN: [id number]  Today's Date: 10/18/2023               Admit Date: 10/16/2023    Visit Dx:     ICD-10-CM ICD-9-CM   1. Altered mental status, unspecified altered mental status type  R41.82 780.97     Patient Active Problem List   Diagnosis    Acute respiratory failure with hypoxia     History reviewed. No pertinent past medical history.  History reviewed. No pertinent surgical history.      Inpatient Speech Pathology Dysphagia Evaluation        PAIN SCALE: None noted    PRECAUTIONS/CONTRAINDICATIONS: None noted    SUSPECTED ABUSE/NEGLECT/EXPLOITATION: None noted    SOCIAL/PSYCHOLOGICAL NEEDS/BARRIERS: None noted    PAST SOCIAL HISTORY: Lives at home    PRIOR FUNCTION: Independent    PATIENT GOALS/EXPECTATIONS: Did not report    HISTORY: This patient is an 83-year-old female admitted with the above diagnosis.  Patient was intubated from 10/16/2023 to 10/17/2023.  Patient currently on 2 L nasal cannula.  Patient with fluctuating level of alertness throughout the day.  Currently still drowsy but will awaken with stimulation and verbally respond and answer questions appropriately.    CURRENT DIET LEVEL: N.p.o., core track    OBJECTIVE:    TEST ADMINISTERED: Clinical dysphagia evaluation    COGNITION/SAFETY AWARENESS: Reduced orientation     BEHAVIORAL OBSERVATIONS: Cooperative    ORAL MOTOR EXAM: Generalized oral motor weakness is noted    VOICE QUALITY: Breathy voicing, weak cough, stronger when cued    REFLEX EXAM: Deferred    POSTURE: 90 degrees upright    FEEDING/SWALLOWING FUNCTION: Assessed with ice chips, spoonfed sips of ice water, nectar thick liquids and puréed consistencies.    CLINICAL OBSERVATIONS: Oral stage is characterized by slow bolus preparation, reduced bolus control noted at times.  Difficulty managing ice chips.  Clinical signs of aspiration noted intermittently  with thin liquids and ice chips.  Patient required mod to max cueing throughout due to diminished level of alertness.  Tolerated spoonfed trials of nectar thick liquids without clinical sign or symptom of aspiration.  Tolerated purée consistencies without clinical sign or symptom of aspiration.  Appears to have adequate laryngeal elevation per palpation.    DYSPHAGIA CRITERIA: Risk of aspiration    FUNCTIONAL ASSESSMENT INSTRUMENT: Patient currently scored a level 3  of 7 on Functional Communication Measures for swallowing indicating a 60-79% limitation in function.    ASSESSMENT/ PLAN OF CARE:  Pt presents with limitations, noted below, that impede her ability to swallow safely. The skills of a therapist will be required to safely and effectively implement the following treatment plan to restore maximal level of function.    PROBLEMS:  1.  Dysphagia   LTG 1: (30 days) patient will increase ability to tolerate least restrictive diet and improve functional communication measure for swallowing to a 5 of 7   STG 1a: (14 days) patient will tolerate nectar thick liquids without clinical sign or symptom of aspiration with 8 of 10 trials.   STG 1b: (14 days) patient will tolerate therapeutic trials of thin, purée and soft solids without clinical sign or symptom of aspiration with 8 of 10 trials.   STG 1c: (14 days) patient/family teaching regarding diet recommendations, safe swallow strategies and signs and symptoms of aspiration.      TREATMENT: Dysphagia therapy to ensure diet tolerance, advance to least restrictive diet and analyze aspiration risk    FREQUENCY/DURATION: Twice daily 5 times a week    REHAB POTENTIAL:  Pt has good rehab potential.  The following limitations may influence improvement/ length of tx medical status.    RECOMMENDATIONS:   1.   DIET: Continue core track as primary source of nutrition/hydration.  If patient is fully awake and alert and requesting, may have spoonfed sips of nectar thick clear  liquids if seated 90 degrees upright.     Speech therapy will follow-up in the a.m. and reassess readiness to advance diet.    Pt/responsible party agrees with plan of care and has been informed of all alternatives, risks and benefits.      EDUCATION  The patient has been educated in the following areas:   Dysphagia (Swallowing Impairment).       Cesilia Courtney, SLP  10/18/2023

## 2023-10-18 NOTE — THERAPY EVALUATION
Acute Care - Physical Therapy Initial Evaluation   Erika     Patient Name: Chula Adkins  : 1940  MRN: [id number]  Today's Date: 10/18/2023      Visit Dx:     ICD-10-CM ICD-9-CM   1. Altered mental status, unspecified altered mental status type  R41.82 780.97   2. Difficulty walking  R26.2 719.7     Patient Active Problem List   Diagnosis    Acute respiratory failure with hypoxia     History reviewed. No pertinent past medical history.  History reviewed. No pertinent surgical history.  PT Assessment (last 12 hours)       PT Evaluation and Treatment       Row Name 10/18/23 1300          Physical Therapy Time and Intention    Document Type evaluation  -AV     Mode of Treatment individual therapy;physical therapy  -AV       Row Name 10/18/23 1300          General Information    Patient Profile Reviewed yes  -AV     Prior Level of Function --  Ambulated without an assistive device. No home O2. Family at bedside assisting with prior level as patient lethargic during evaluation.  -AV     Equipment Currently Used at Home none  -AV     Existing Precautions/Restrictions fall  -AV       Row Name 10/18/23 1300          Living Environment    Current Living Arrangements home  -AV     Home Accessibility stairs to enter home;stairs within home  -AV     People in Home spouse  -AV       Row Name 10/18/23 1300          Home Main Entrance    Number of Stairs, Main Entrance --  Elongated steps on sidewalk leading up to porch  -AV       Row Name 10/18/23 1300          Stairs Within Home, Primary    Stairs, Within Home, Primary Flight to basement where laundry is located  -AV       Row Name 10/18/23 1300          Cognition    Orientation Status (Cognition) oriented x 3  -AV       Row Name 10/18/23 1300          Range of Motion (ROM)    Range of Motion bilateral lower extremities;ROM is WFL  -AV       Row Name 10/18/23 1300          Strength (Manual Muscle Testing)    Strength (Manual Muscle Testing) right lower  extremity strength;left lower extremity strength  -AV     Left Lower Extremity Strength hip;knee;ankle  -AV     Hip, Left (Strength) 2+/5  -AV     Knee, Left (Strength) 3+/5  -AV     Ankle, Left (Strength) 3+/5  -AV     Right Lower Extremity Strength hip;knee;ankle  -AV     Hip, Right (Strength) 3-/5  -AV     Knee, Right (Strength) 3+/5  -AV     Ankle, Right (Strength) 3+/5  -AV       Row Name 10/18/23 1300          Bed Mobility    Bed Mobility bed mobility (all) activities  -AV     All Activities, [place] (Bed Mobility) maximum assist (25% patient effort);dependent (less than 25% patient effort)  -AV       Row Name 10/18/23 1300          Safety Issues, Functional Mobility    Impairments Affecting Function (Mobility) balance;endurance/activity tolerance;strength;shortness of breath;postural/trunk control  -AV       Row Name 10/18/23 1300          Plan of Care Review    Plan of Care Reviewed With patient;family  -AV     Progress no change  -AV     Outcome Evaluation Patient presents with deficits in balance, strength, transfers, and ambulation. Patient will benefit from skilled PT services to address these mobility deficits and decrease risk of falls.  -AV       Row Name 10/18/23 1300          Therapy Assessment/Plan (PT)    Rehab Potential (PT) good, to achieve stated therapy goals  -AV     Criteria for Skilled Interventions Met (PT) yes;meets criteria  -AV     Therapy Frequency (PT) daily  -AV     Predicted Duration of Therapy Intervention (PT) 10 days  -AV     Problem List (PT) problems related to;balance;mobility;strength  -AV     Activity Limitations Related to Problem List (PT) unable to transfer safely;unable to ambulate safely  -AV       Row Name 10/18/23 1300          PT Evaluation Complexity    History, PT Evaluation Complexity no personal factors and/or comorbidities  -AV     Examination of Body Systems (PT Eval Complexity) total of 4 or more elements  -AV     Clinical Presentation (PT Evaluation  Complexity) stable  -AV     Clinical Decision Making (PT Evaluation Complexity) low complexity  -AV     Overall Complexity (PT Evaluation Complexity) low complexity  -AV       Row Name 10/18/23 1300          Therapy Plan Review/Discharge Plan (PT)    Therapy Plan Review (PT) evaluation/treatment results reviewed;patient  -AV       Row Name 10/18/23 1300          Physical Therapy Goals    Bed Mobility Goal Selection (PT) bed mobility, PT goal 1  -AV     Transfer Goal Selection (PT) transfer, PT goal 1  -AV     Gait Training Goal Selection (PT) gait training, PT goal 1  -AV       Row Name 10/18/23 1300          Bed Mobility Goal 1 (PT)    Activity/Assistive Device (Bed Mobility Goal 1, PT) sit to supine/supine to sit  -AV     [place] Level/Cues Needed (Bed Mobility Goal 1, PT) minimum assist (75% or more patient effort)  -AV     Time Frame (Bed Mobility Goal 1, PT) 10 days  -AV       Row Name 10/18/23 1300          Transfer Goal 1 (PT)    Activity/Assistive Device (Transfer Goal 1, PT) sit-to-stand/stand-to-sit;bed-to-chair/chair-to-bed;walker, rolling  -AV     [place] Level/Cues Needed (Transfer Goal 1, PT) minimum assist (75% or more patient effort)  -AV     Time Frame (Transfer Goal 1, PT) 10 days  -AV       Row Name 10/18/23 1300          Gait Training Goal 1 (PT)    Activity/Assistive Device (Gait Training Goal 1, PT) gait (walking locomotion);assistive device use;walker, rolling  -AV     [place] Level (Gait Training Goal 1, PT) minimum assist (75% or more patient effort)  -AV     Distance (Gait Training Goal 1, PT) 30  -AV     Time Frame (Gait Training Goal 1, PT) 10 days  -AV               User Key  (r) = Recorded By, (t) = Taken By, (c) = Cosigned By      Initials Name Provider Type    AV Terrell Mcelroy, PT Physical Therapist                    Physical Therapy Education       Title: PT OT SLP Therapies (In Progress)       Topic: Physical Therapy (In Progress)       Point: Mobility training  (Done)       Learning Progress Summary             Patient Acceptance, E,TB, VU by AV at 10/18/2023 1329                         Point: Home exercise program (Not Started)       Learner Progress:  Not documented in this visit.              Point: Body mechanics (Done)       Learning Progress Summary             Patient Acceptance, E,TB, VU by AV at 10/18/2023 1329                         Point: Precautions (Done)       Learning Progress Summary             Patient Acceptance, E,TB, VU by AV at 10/18/2023 1329                                         User Key       Initials Effective Dates Name Provider Type Discipline    AV 06/11/21 -  Terrell Mcelroy, PT Physical Therapist PT                  PT Recommendation and Plan  Anticipated Discharge Disposition (PT): sub acute care setting  Planned Therapy Interventions (PT): balance training, bed mobility training, gait training, home exercise program, neuromuscular re-education, strengthening, transfer training  Therapy Frequency (PT): daily  Plan of Care Reviewed With: patient, family  Progress: no change  Outcome Evaluation: Patient presents with deficits in balance, strength, transfers, and ambulation. Patient will benefit from skilled PT services to address these mobility deficits and decrease risk of falls.   Outcome Measures       Row Name 10/18/23 1300             How much help from another person do you currently need...    Turning from your back to your side while in flat bed without using bedrails? 1  -AV      Moving from lying on back to sitting on the side of a flat bed without bedrails? 1  -AV      Moving to and from a bed to a chair (including a wheelchair)? 1  -AV      Standing up from a chair using your arms (e.g., wheelchair, bedside chair)? 1  -AV      Climbing 3-5 steps with a railing? 1  -AV      To walk in hospital room? 1  -AV      AM-PAC 6 Clicks Score (PT) 6  -AV      Highest level of mobility 2 --> Bed activities/dependent transfer  -AV          Functional Assessment    Outcome Measure Options AM-PAC 6 Clicks Basic Mobility (PT)  -AV                User Key  (r) = Recorded By, (t) = Taken By, (c) = Cosigned By      Initials Name Provider Type    Terrell Byrd, ABDULLAHI Physical Therapist                     Time Calculation:    PT Charges       Row Name 10/18/23 1328             Time Calculation    PT Received On 10/18/23  -AV      PT Goal Re-Cert Due Date 10/27/23  -AV         Untimed Charges    PT Eval/Re-eval Minutes 32  -AV         Total Minutes    Untimed Charges Total Minutes 32  -AV       Total Minutes 32  -AV                User Key  (r) = Recorded By, (t) = Taken By, (c) = Cosigned By      Initials Name Provider Type    Terrell Byrd, ABDULLAHI Physical Therapist                  Therapy Charges for Today       Code Description Service Date Service Provider Modifiers Qty    [id number] HC PT EVAL LOW COMPLEXITY 3 10/18/2023 Terrell Mcelroy, PT GP 1            PT G-Codes  Outcome Measure Options: AM-PAC 6 Clicks Basic Mobility (PT)  AM-PAC 6 Clicks Score (PT): 6  AM-PAC 6 Clicks Score (OT): 6    Terrell Mcelroy PT  10/18/2023

## 2023-10-18 NOTE — CONSULTS
"Nutrition Services    Patient Name: Chula Adkins  YOB: 1940  MRN: [id number]  Admission date: 10/16/2023      CLINICAL NUTRITION ASSESSMENT      Reason for Assessment  Physician consult, EN     H&P:    History reviewed. No pertinent past medical history.     Current Problems:   Active Hospital Problems    Diagnosis     **Acute respiratory failure with hypoxia     Acute respiratory failure with hypoxia and hypercapnia     Charmaine coma scale total score 3-8     Lactic acidosis         Nutrition/Diet History         Narrative     83-year-old female admitted with acute respiratory failure with hypoxia and sepsis.  Patient intubated to preserve airway.  Patient extubated 10/17/2023.  Concern for long-term aspiration precipitating this event.      Nursing notes skin intact with a Yariel score = 14.    No BM documented this admission.  Patient has orders for Sandra-Colace, MiraLAX, Dulcolax suppository.    Patient is n.p.o. until speech can evaluate.  Pt has not been alert enough to complete the evaluation.  Pt currently at goal rate.  BMI is elevated for age.  Consistent with class II obesity.  Patient is 176% of ideal body weight 120 pounds 9 ounces.No weight history available to assess pattern of weight loss.    Receiving:  Fibersource HN @ 65 ml/hr, via Cortrak NG tube, per continuous pump. Flush tube with 120 ml free water every 3 hours.       Anthropometrics        Current Height, Weight Height: 162.6 cm (64\")  Weight: 99.2 kg (218 lb 11.1 oz)   Current BMI Body mass index is 37.54 kg/m².       Weight Hx  Wt Readings from Last 30 Encounters:   10/18/23 0600 99.2 kg (218 lb 11.1 oz)   10/17/23 0600 96.4 kg (212 lb 8.4 oz)   10/16/23 0751 99 kg (218 lb 4.1 oz)            Wt Change Observation Unable to assess due to lack of information     Estimated/Assessed Needs       Energy Requirements Estimated nutrient needs calculated using adjusted body weight 157 pounds / 71 kg   EST Needs (kcal/day) 25 " "kcals per kilogram = 1775 [place]       Protein Requirements    EST Daily Needs (g/day) 1.0 g/kg = 71 g of protein       Fluid Requirements     Estimated Needs (mL/day) 30 mL/kg = 2130 mL (8 to 9 cups)     Labs/Medications         Pertinent Labs Reviewed.  Decline in Phosphorus however, magnesium remains normal.  Pt at goal rate, monitor for another 24 hours.     Results from last 7 days   Lab Units 10/18/23  0329 10/18/23  0242 10/17/23  1410 10/17/23  1012 10/17/23  0045 10/16/23  0807   SODIUM mmol/L 143  --   --   --  142 138   SODIUM, ARTERIAL mmol/L  --  141.5 140.8   < >  --   --    POTASSIUM mmol/L 3.7  --   --   --  4.0 4.8   CHLORIDE mmol/L 114*  --   --   --  112* 107   CO2 mmol/L 20.3*  --   --   --  20.3* 18.4*   BUN mg/dL 32*  --   --   --  50* 54*   CREATININE mg/dL 1.04*  --   --   --  1.31* 1.20*   CALCIUM mg/dL 8.9  --   --   --  8.4* 8.0*   BILIRUBIN mg/dL  --   --   --   --   --  0.2   ALK PHOS U/L  --   --   --   --   --  60   ALT (SGPT) U/L  --   --   --   --   --  11   AST (SGOT) U/L  --   --   --   --   --  10   GLUCOSE mg/dL 133*  --   --   --  90 121*   GLUCOSE, ARTERIAL mg/dL  --  140* 98   < >  --   --     < > = values in this interval not displayed.     Results from last 7 days   Lab Units 10/18/23  0329 10/17/23  1325 10/17/23  0045   MAGNESIUM mg/dL 2.2 1.9 1.7   PHOSPHORUS mg/dL 2.0*  --  3.4   HEMOGLOBIN g/dL 10.9*  --  12.6   HEMATOCRIT % 35.2  --  41.1     No results found for: \"COVID19\"  Lab Results   Component Value Date    HGBA1C 5.70 (H) 10/17/2023         Pertinent Medications Reviewed.     Current Nutrition Orders & Evaluation of Intake       Oral Nutrition     Current PO Diet NPO Diet NPO Type: Strict NPO   Supplement Orders Placed This Encounter      Place Feeding Tube Per SIZESEEKER System      Diet, Tube Feeding Tube Feeding Formula: Fibersource HN; Tube Feeding Type: Continuous; Continuous Tube Feeding Start Rate (mL/hr): 25; Then Advance Rate By (mL/hr): 25; Every __ Hours: 4; " To Goal Rate of (mL/hr): Other; Other: 65; Total Daily Vol...       Malnutrition Severity Assessment                Nutrition Diagnosis         Nutrition Dx Problem 1 Swallowing difficulty related to decreased ability to consume sufficient energy as evidenced by NPO       Nutrition Intervention         Fibersource HN @ 65 ml/hr, via Cortrak NG tube, per continuous pump. Flush tube with 120 ml free water every 3 hours.    Provides 1716 calories, 77 grams protein, 2118 ml free water     Medical Nutrition Therapy/Nutrition Education          Learner     Readiness Patient  Education not appropriate at this time     Method     Response N/A  N/A     Monitor/Evaluation        Monitor Per protocol, Pertinent labs, EN delivery/tolerance, GI status, POC/GOC, Swallow function, RFS, Diet advancement       Nutrition Discharge Plan         To be determined       Electronically signed by:  Taylor Canchola RD  10/18/23 10:56 EDT

## 2023-10-18 NOTE — PLAN OF CARE
Goal Outcome Evaluation:  Plan of Care Reviewed With: patient        Progress: no change  Outcome Evaluation: Patient presents with limitations affecting strength, activity tolerance, and balance impacting patient's ability to return home safely and independently.  The skills of a therapist will be required to safely and effectively implement the following treatment plan to restore maximal level of function      Anticipated Discharge Disposition (OT): skilled nursing facility, inpatient rehabilitation facility

## 2023-10-18 NOTE — PROGRESS NOTES
[facility]   Hospitalist Progress Note  Date: 10/18/2023  Patient Name: Chula Adkins  : 1940  MRN: [id number]  Date of admission: 10/16/2023      Subjective   Subjective     Chief Complaint: Unresponsive.  Hypoxemia.    Summary:   Chula Adkins is a 83 y.o. female with past medical history of diet-controlled diabetes mellitus and hypertension, restless leg syndrome, chronic pain syndrome on Percocet by pain management, hyperlipidemia and hypothyroidism.  History is obtained by patient and  at bedside as patient is intubated and unresponsive.  Patient was last seen well around 2:30 AM In Her Kitchen Talking to her cat.  No complaints of chest pain, fever, cough, shortness of air, nausea, vomiting, diarrhea or abdominal pain and no urinary complaints per .  Around 6 AM she was found unresponsive by her .  EMS found her to be hypoxic with O2 sat of 80%.  Work-up in the ED showed 64% saturation patient was hypothermic.  Patient was intubated in ED with no gag reflex.  There was Chapstick found in her throat.  Afterwards she dropped her blood pressure to 77/59 requiring 2 L of fluid and IV Levophed.  Her temp was 91.7 started on warming blanket.  ABG showed pH of 7.19, PCO2 55, PO2 of 233, bicarb of 20, 99% saturation on 100% FiO2.  Troponin T is 15.  Creatinine is 1.2 baseline not available.  BUN of 54.  Lactic acid of 5.9.  TSH free T4 within normal range.  Hemoglobin of 9.8.  UA is negative.  EKG showed normal sinus rhythm.  CT scan of C-spine did not show any fractures it did show gas in bilateral subclavian and brachiocephalic vein.  CT head negative.  CT of the chest showed left upper lobe segmental and left lower lobe subsegmental PE with bilateral lower lobe consolidations more on the righ possibly aspiration.  Gas in the band is improving.  Her sputum and blood culture is pending.  Hospitalist has been called to admit her for further evaluation.  Per   patient has not been sedentary and her last surgical procedure sounded like a repair of rectal prolapse about 2 months ago.  When patient got to the floor she started to get more agitated.  She was restrained and started on heparin drip.  Extubated on October 17 to nasal cannula.    Interval Followup:   Blood pressure soft.  Off Levophed.  Patient on nasal cannula with 93% saturation.  During the night patient was very lethargic and was put on BiPAP.  Repeat ABG with pH of 7.36 PCO2 of 33.  PO2 of 58.  90% saturation on room air.  BiPAP DC'd  Patient responding appropriately to vocal commands  Oriented x3 although lethargic  Tolerating tube feed well.  Not cleared by speech therapy yet  Out of restraints  Denies any shortness of air or chest pain  Good urine output +2 L    Review of Systems   All systems were reviewed and negative except for: Summary and interval follow-up    Objective   Objective     Vitals:   Temp:  [99 °F (37.2 °C)-100 °F (37.8 °C)] 100 °F (37.8 °C)  Heart Rate:  [76-91] 91  Resp:  [16-22] 22  BP: ()/(55-89) 131/87  Flow (L/min):  [2-3] 3  Physical Exam    Constitutional:lethargic, no acute distress   Eyes: Pupils equal, sclerae anicteric, no conjunctival injection   HENT: NCAT, mucous membranes dry   Neck: Supple, no thyromegaly, no lymphadenopathy, trachea midline   Respiratory: Occasional wheezing, decreased to auscultation bilaterally, nonlabored respirations    Cardiovascular: RRR, no murmurs, rubs, or gallops, palpable pedal pulses bilaterally   Gastrointestinal: Positive  core track, positive bowel sounds, soft, nontender, nondistended   Musculoskeletal: No bilateral ankle edema, no clubbing or cyanosis to extremities   Psychiatric: Appropriate affect, cooperative   Neurologic: Oriented x 3, nonfocal .speech not fully clear yet   skin: No rashes     Result Review    Result Review:  I have personally reviewed the results for the past 24 hours and agree with these findings:  [x]   Laboratory  [x]  Microbiology  [x]  Radiology  [x]  EKG/Telemetry   []  Cardiology/Vascular   []  Pathology  [x]  Old records  [x]  Other: Medications    Assessment & Plan   Assessment / Plan     Assessment:  Severe sepsis with shock.  Improved   Acute hypoxemic and hypercapnic respiratory failure.No home oxygen use.Due to pneumonia and mucous plugging.  Extubated October 17.  Improving  Right more than left basilar pneumonia likely aspiration.MSSA.S/p bronchoscopy.  Left upper lobe segmental and left lower lobe subsegmental acute PE.  Unprovoked first episode.     hypothermia.  Renal insufficiency.  Baseline not known.  Gas in bilateral subclavian and brachiocephalic vein.  Likely from intravenous intervention in the ED or by EMS.  Improving.  No intervention as per vascular.  Chronic pain syndrome on Percocet.  Although it is listed as an allergy.  Restless leg syndrome.  History of diabetes mellitus diet controlled.  Hemoglobin A1c of 5.7%  Hypertension diet controlled.  Hypothyroidism.  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  Mild thrombocytopenia likely from sepsis.    Plan:   DC IV fluid  Off Levophed.  Continue midodrine  ENT consulted.  Appreciate input.  Will evaluate patient in October 28 with a scope.  Per ENT hold off CT scan of sinuses.  ENT okay with stopping IV itraconazole.  Doubt she has mycetoma..            Discussed with pulmonary appreciate input.  S/p bronchoscopy  IV Ancef.  DC Zosyn  procalcitonin noted   MRSA PCR.  Negative.   Await culture data.  Bronch culture and PCR positive for Staph aureus.  Await old records.  Change IV heparin to therapeutic Lovenox  Off warming blanket.  Resume appropriate home medication.  Bronchopulmonary hygiene and bronchodilator protocol.  Discussed with vascular no intervention needed for gas in her subclavian and brachiocephalic veins.  It is already improving on second CT scan.  Pepcid   2D echo.  Noted with EF of  58%  MRI of the brain noted with left maxillary polyp versus mycetoma.  EEG no epileptiform discharges.  Diffuse encephalopathy.   As needed fentanyl.  NG.  Tube feed as per dietitian  Speech evaluation.  Advance diet as per speech therapy  PT OT  Off restraints  Continue Eng catheter  Transfer out of the unit    Discussed plan with RN .    DVT prophylaxis:  Medical and mechanical DVT prophylaxis orders are present.    CODE STATUS:   Code Status (Patient has no pulse and is not breathing): CPR (Attempt to Resuscitate)  Medical Interventions (Patient has pulse or is breathing): Full Support      Part of this note may be an electronic transcription/translation of spoken language to printed text using the Dragon Dictation System.   Critical care time spent 31 minutes.      Electronically signed by Husam Rey MD, 10/18/23, 4:20 PM EDT.

## 2023-10-18 NOTE — PLAN OF CARE
Goal Outcome Evaluation:   Pt was on BiPAP continuously until 0235 and wanted it off. We marshal gases and nurse placed pt on nc at this time.

## 2023-10-18 NOTE — THERAPY EVALUATION
Patient Name: Chula Adkins  : 1940    MRN: [id number]                              Today's Date: 10/18/2023       Admit Date: 10/16/2023    Visit Dx:     ICD-10-CM ICD-9-CM   1. Altered mental status, unspecified altered mental status type  R41.82 780.97     Patient Active Problem List   Diagnosis    Acute respiratory failure with hypoxia     History reviewed. No pertinent past medical history.  History reviewed. No pertinent surgical history.   General Information       Row Name 10/18/23 1226          OT Time and Intention    Document Type evaluation  -PG     Mode of Treatment individual therapy;occupational therapy  -PG       Row Name 10/18/23 1226          General Information    Patient Profile Reviewed yes  According to spouse, patient independent with all self-care and functional transfers at home using no assistive device.  Patient has been active at home  -PG     Prior Level of Function independent:;transfer;ADL's  -PG     Existing Precautions/Restrictions fall  -PG     Barriers to Rehab none identified  -PG       Row Name 10/18/23 1226          Occupational Profile    Reason for Services/Referral (Occupational Profile) Patient is an 83-year-old female admitted for pneumonia, PE and respiratory failure.  No previous OT services identified.  Patient is being evaluated by Occupational Therapy due to recent decline in ADL function.  -PG       Row Name 10/18/23 1226          Living Environment    People in Home spouse  -PG       Row Name 10/18/23 1226          Cognition    Orientation Status (Cognition) oriented x 3  -PG       Row Name 10/18/23 1226          Safety Issues, Functional Mobility    Impairments Affecting Function (Mobility) balance;endurance/activity tolerance;strength;shortness of breath  -PG               User Key  (r) = Recorded By, (t) = Taken By, (c) = Cosigned By      Initials Name Provider Type    PG Uziel Lara, OT Occupational Therapist                     Mobility/ADL's        Row Name 10/18/23 1228          Bed Mobility    Bed Mobility bed mobility (all) activities  -     All Activities, [place] (Bed Mobility) dependent (less than 25% patient effort)  -PG       Row Name 10/18/23 1228          Activities of Daily Living    BADL Assessment/Intervention bathing;upper body dressing;lower body dressing;grooming;feeding;toileting  -       Row Name 10/18/23 1228          Bathing Assessment/Intervention    [place] Level (Bathing) bathing skills;dependent (less than 25% patient effort)  -PG       Row Name 10/18/23 1228          Upper Body Dressing Assessment/Training    [place] Level (Upper Body Dressing) upper body dressing skills;dependent (less than 25% patient effort)  -       Row Name 10/18/23 1228          Lower Body Dressing Assessment/Training    [place] Level (Lower Body Dressing) lower body dressing skills;dependent (less than 25% patient effort)  -       Row Name 10/18/23 1228          Grooming Assessment/Training    [place] Level (Grooming) grooming skills;dependent (less than 25% patient effort)  -       Row Name 10/18/23 1228          Self-Feeding Assessment/Training    [place] Level (Feeding) feeding skills;dependent (less than 25% patient effort)  -       Row Name 10/18/23 1228          Toileting Assessment/Training    [place] Level (Toileting) toileting skills;dependent (less than 25% patient effort)  -PG               User Key  (r) = Recorded By, (t) = Taken By, (c) = Cosigned By      Initials Name Provider Type    PG Uziel Lara, OT Occupational Therapist                   Obj/Interventions       Row Name 10/18/23 1229          Sensory Assessment (Somatosensory)    Sensory Assessment (Somatosensory) unable/difficult to assess  -PG       Row Name 10/18/23 1229          Vision Assessment/Intervention    Visual Impairment/Limitations unable/difficult to assess  -       Row Name 10/18/23 1229          Range of Motion  Comprehensive    General Range of Motion upper extremity range of motion deficits identified  -PG     Comment, General Range of Motion Decreased right shoulder flexion, left upper extremity within functional limits  -PG       Row Name 10/18/23 1229          Strength Comprehensive (MMT)    Comment, General Manual Muscle Testing (MMT) Assessment Deferred at this time  -PG       Row Name 10/18/23 1229          Motor Skills    Motor Skills coordination;functional endurance  -PG     Coordination fine motor deficit;minimal impairment  -PG     Functional Endurance Poor  -PG               User Key  (r) = Recorded By, (t) = Taken By, (c) = Cosigned By      Initials Name Provider Type    PG Uziel Lara, OT Occupational Therapist                   Goals/Plan       Row Name 10/18/23 1231          Transfer Goal 1 (OT)    Activity/Assistive Device (Transfer Goal 1, OT) transfers, all  -PG     [place] Level/Cues Needed (Transfer Goal 1, OT) modified independence  -PG     Time Frame (Transfer Goal 1, OT) long term goal (LTG);10 days  -PG       Row Name 10/18/23 1231          Bathing Goal 1 (OT)    Activity/Device (Bathing Goal 1, OT) bathing skills, all  -PG     [place] Level/Cues Needed (Bathing Goal 1, OT) modified independence  -PG     Time Frame (Bathing Goal 1, OT) long term goal (LTG);10 days  -PG       Row Name 10/18/23 1231          Dressing Goal 1 (OT)    Activity/Device (Dressing Goal 1, OT) dressing skills, all  -PG     [place]/Cues Needed (Dressing Goal 1, OT) modified independence  -PG     Time Frame (Dressing Goal 1, OT) long term goal (LTG);10 days  -PG       Row Name 10/18/23 1231          Toileting Goal 1 (OT)    Activity/Device (Toileting Goal 1, OT) toileting skills, all  -PG     [place] Level/Cues Needed (Toileting Goal 1, OT) modified independence  -PG     Time Frame (Toileting Goal 1, OT) long term goal (LTG);10 days  -PG       Row Name 10/18/23 1231          Grooming Goal 1 (OT)     Activity/Device (Grooming Goal 1, OT) grooming skills, all  -PG     [place] (Grooming Goal 1, OT) modified independence  -PG     Time Frame (Grooming Goal 1, OT) long term goal (LTG);10 days  -PG       Row Name 10/18/23 1231          Problem Specific Goal 1 (OT)    Problem Specific Goal 1 (OT) Patient will improve activity tolerance to fair to support independence and engagement with ADL activities  -PG     Time Frame (Problem Specific Goal 1, OT) long term goal (LTG);10 days  -PG       Row Name 10/18/23 1231          Therapy Assessment/Plan (OT)    Planned Therapy Interventions (OT) activity tolerance training;BADL retraining;strengthening exercise;transfer/mobility retraining;patient/caregiver education/training;occupation/activity based interventions  -PG               User Key  (r) = Recorded By, (t) = Taken By, (c) = Cosigned By      Initials Name Provider Type    PG Uziel Lara, OT Occupational Therapist                   Clinical Impression       Row Name 10/18/23 1230          Pain Assessment    Pretreatment Pain Rating 0/10 - no pain  -PG     Posttreatment Pain Rating 0/10 - no pain  -PG       Row Name 10/18/23 1230          Plan of Care Review    Plan of Care Reviewed With patient  -PG     Progress no change  -PG     Outcome Evaluation Patient presents with limitations affecting strength, activity tolerance, and balance impacting patient's ability to return home safely and independently.  The skills of a therapist will be required to safely and effectively implement the following treatment plan to restore maximal level of function  -PG       Row Name 10/18/23 1230          Therapy Assessment/Plan (OT)    Patient/Family Therapy Goal Statement (OT) Return home independently  -PG     Rehab Potential (OT) good, to achieve stated therapy goals  -PG     Criteria for Skilled Therapeutic Interventions Met (OT) yes;meets criteria;skilled treatment is necessary  -PG     Therapy Frequency (OT) 5 times/wk  -PG        Row Name 10/18/23 1230          Therapy Plan Review/Discharge Plan (OT)    Anticipated Discharge Disposition (OT) skilled nursing facility;inpatient rehabilitation facility  -PG               User Key  (r) = Recorded By, (t) = Taken By, (c) = Cosigned By      Initials Name Provider Type    PG Uziel Lara OT Occupational Therapist                   Outcome Measures       Row Name 10/18/23 1232          How much help from another is currently needed...    Putting on and taking off regular lower body clothing? 1  -PG     Bathing (including washing, rinsing, and drying) 1  -PG     Toileting (which includes using toilet bed pan or urinal) 1  -PG     Putting on and taking off regular upper body clothing 1  -PG     Taking care of personal grooming (such as brushing teeth) 1  -PG     Eating meals 1  -PG     AM-PAC 6 Clicks Score (OT) 6  -PG       Row Name 10/18/23 1232          Functional Assessment    Outcome Measure Options AM-PAC 6 Clicks Daily Activity (OT);Optimal Instrument  -PG       Row Name 10/18/23 1232          Optimal Instrument    Optimal Instrument Optimal - 3  -PG     Bending/Stooping 5  -PG     Standing 5  -PG     Reaching 5  -PG     From the list, choose the 3 activities you would most like to be able to do without any difficulty Bending/stooping;Standing;Reaching  -PG     Total Score Optimal - 3 15  -PG               User Key  (r) = Recorded By, (t) = Taken By, (c) = Cosigned By      Initials Name Provider Type    Uziel Morel OT Occupational Therapist                    Occupational Therapy Education       Title: PT OT SLP Therapies (In Progress)       Topic: Occupational Therapy (In Progress)       Point: ADL training (In Progress)       Description:   Instruct learner(s) on proper safety adaptation and remediation techniques during self care or transfers.   Instruct in proper use of assistive devices.                  Learning Progress Summary             Patient Acceptance, E,D, NR by PG  at 10/18/2023 1233                         Point: Home exercise program (In Progress)       Description:   Instruct learner(s) on appropriate technique for monitoring, assisting and/or progressing therapeutic exercises/activities.                  Learning Progress Summary             Patient Acceptance, E,D, NR by PG at 10/18/2023 1233                         Point: Precautions (In Progress)       Description:   Instruct learner(s) on prescribed precautions during self-care and functional transfers.                  Learning Progress Summary             Patient Acceptance, E,D, NR by PG at 10/18/2023 1233                         Point: Body mechanics (In Progress)       Description:   Instruct learner(s) on proper positioning and spine alignment during self-care, functional mobility activities and/or exercises.                  Learning Progress Summary             Patient Acceptance, E,D, NR by PG at 10/18/2023 1233                                         User Key       Initials Effective Dates Name Provider Type Discipline    PG 06/16/21 -  Uziel Lara OT Occupational Therapist OT                  OT Recommendation and Plan  Planned Therapy Interventions (OT): activity tolerance training, BADL retraining, strengthening exercise, transfer/mobility retraining, patient/caregiver education/training, occupation/activity based interventions  Therapy Frequency (OT): 5 times/wk  Plan of Care Review  Plan of Care Reviewed With: patient  Progress: no change  Outcome Evaluation: Patient presents with limitations affecting strength, activity tolerance, and balance impacting patient's ability to return home safely and independently.  The skills of a therapist will be required to safely and effectively implement the following treatment plan to restore maximal level of function     Time Calculation:   Evaluation Complexity (OT)  Review Occupational Profile/Medical/Therapy History Complexity: brief/low complexity  Assessment,  Occupational Performance/Identification of Deficit Complexity: 3-5 performance deficits  Clinical Decision Making Complexity (OT): problem focused assessment/low complexity  Overall Complexity of Evaluation (OT): low complexity     Time Calculation- OT       Row Name 10/18/23 1234             Time Calculation- OT    OT Received On 10/18/23  -PG      OT Goal Re-Cert Due Date 10/27/23  -PG         Untimed Charges    OT Eval/Re-eval Minutes 35  -PG         Total Minutes    Untimed Charges Total Minutes 35  -PG       Total Minutes 35  -PG                User Key  (r) = Recorded By, (t) = Taken By, (c) = Cosigned By      Initials Name Provider Type    PG Uziel Lara OT Occupational Therapist                  Therapy Charges for Today       Code Description Service Date Service Provider Modifiers Qty    [id number] HC OT EVAL LOW COMPLEXITY 3 10/18/2023 Uziel Lara OT GO 1                 Uziel Lara OT  10/18/2023

## 2023-10-18 NOTE — PLAN OF CARE
Goal Outcome Evaluation:  Plan of Care Reviewed With: patient, family        Progress: no change  Outcome Evaluation: Patient presents with deficits in balance, strength, transfers, and ambulation. Patient will benefit from skilled PT services to address these mobility deficits and decrease risk of falls.      Anticipated Discharge Disposition (PT): sub acute care setting

## 2023-10-18 NOTE — PROGRESS NOTES
Pulmonary / Critical Care Progress Note      Patient Name: Chula Adkins  : 1940  MRN: [id number]  Attending:  Husam Rey MD   Date of admission: 10/16/2023    Subjective   Subjective   Patient critically ill with respiratory failure, pneumonia, pulmonary embolism    Interim summary: Patient admitted to the intensive care unit on mechanical ventilator, found to have pulmonary embolism, aspiration pneumonia, on heparin drip, had bedside bronchoscopy, MRI of brain, required low-dose Levophed    No acute events overnight    Over the past 24H  Patient is sleepy however does follow some commands  Stopped Levo  Extubated   Bronchoscopy pneumonia panel shows MSSA  Sputum culture with gram-positive and gram-negative bacteria  Calcium low      Today  Very sleepy; ABG this a.m. reviewed.  pH 7.36/PCO2 33  Tolerating tube feeds via Cortrak  Tolerated BiPAP overnight, now on 2LNC  1L of UOP x 24H  BAL and respiratory culture with Staph aureus  Aspergillus galactomannan in process      Review of Systems  Unable to obtain patient is lethargic this a.m.      Objective   Objective     Vitals:   Vital signs for last 24 hours:  Temp:  [99 °F (37.2 °C)-99.5 °F (37.5 °C)] 99.4 °F (37.4 °C)  Heart Rate:  [76-92] 79  Resp:  [15-18] 18  BP: ()/(55-84) 107/65    Intake/Output last 3 shifts:  I/O last 3 completed shifts:  In: 4648.7 [I.V.:3521.7; Other:360; NG/GT:767]  Out: 1550 [Urine:1550]  Intake/Output this shift:  No intake/output data recorded.    Vent settings for last 24 hours:       Physical Exam     Vital Signs Reviewed  General: WDWN, sleepy this a.m., NAD  HEENT:  PERRL, EOMI.  OP and nares clear, MMM; Cortrak in place  Chest:  good aeration, clear to auscultation bilaterally, no increased work of breathing, normal symmetric chest wall rise bilaterally  CV: RRR, no MGR, pulses 2+, equal.  Abd:  Soft, NT, ND, + BS, no HSM  Extremities:  no clubbing, no cyanosis, no edema, no joint tenderness  Neuro:   A&Ox1, lethargic this a.m., opened eyes to verbal/tactile stimulation, no obvious focal deficits  Skin: No rashes or lesions noted        Result Review    Result Review:  I have personally reviewed the results from the time of this admission to 10/18/2023 11:10 EDT and agree with these findings:  [x]  Laboratory  [x]  Microbiology  [x]  Radiology  []  EKG/Telemetry   [x]  Cardiology/Vascular   []  Pathology  []  Old records  []  Other:  Most notable findings include:         Lab 10/18/23  0329 10/18/23  0242 10/17/23  1410 10/17/23  1012 10/17/23  0045 10/16/23  0807   WBC 9.83  --   --   --  16.23* 5.38   HEMOGLOBIN 10.9*  --   --   --  12.6 9.8*   HEMATOCRIT 35.2  --   --   --  41.1 32.0*   PLATELETS 133*  --   --   --  184 154   SODIUM 143  --   --   --  142 138   SODIUM, ARTERIAL  --  141.5 140.8 142.5  --   --    POTASSIUM 3.7  --   --   --  4.0 4.8   CHLORIDE 114*  --   --   --  112* 107   CO2 20.3*  --   --   --  20.3* 18.4*   BUN 32*  --   --   --  50* 54*   CREATININE 1.04*  --   --   --  1.31* 1.20*   GLUCOSE 133*  --   --   --  90 121*   GLUCOSE, ARTERIAL  --  140* 98 103*  --   --    CALCIUM 8.9  --   --   --  8.4* 8.0*   PHOSPHORUS 2.0*  --   --   --  3.4 4.1   TOTAL PROTEIN  --   --   --   --   --  4.8*   ALBUMIN  --   --   --   --   --  3.1*   GLOBULIN  --   --   --   --   --  1.7     MRI brain with abnormal appearance of left maxillary antrum and left nasal cavity, 1 differential consideration would be antrochoanal polyp or an associated fungus ball    Assessment & Plan   Assessment / Plan     Active Hospital Problems:  Active Hospital Problems    Diagnosis     **Acute respiratory failure with hypoxia     Acute respiratory failure with hypoxia and hypercapnia     [place] coma scale total score 3-8     Lactic acidosis          Impression:  Acute hypoxic and hypercapnic respiratory failure  Altered mental status, toxic metabolic encephalopathy  MSSA pneumonia  Concern for aspiration  Pulmonary  embolism  History of hypothyroidism  Restless leg syndrome  Chronic pain syndrome    Plan:    -Continue BiPAP as needed during the day and nightly.  Okay to transition to nasal cannula during the day.  Titrate to maintain SPO2 90% or greater  -Concern of aspiration, will have speech therapy evaluate patient  -Dietitian on board.  Cortrak in place.  Tube feeds and free water per them  -Continue heparin drip for pulmonary embolism  -Echocardiogram with EF of 58%, normal diastolic dysfunction and mildly elevated RVSP at 35-45  -Continue Zosyn. Follow BAL culture  -MRI of brain showed an area of concern of the left maxillary antrum differential polyp versus fungus ball.  ENT consulted evaluate patient.  D glucan and Aspergillus antigen in process  -On voriconazole  -EEG showed encephalopathy 10/16  -Has not required Levophed since yesterday afternoon.  Will DC  -Trend renal function and electrolytes.  Replace as needed  -Speech/OT/PT on board  -Peptic ulcer prophylaxis Pepcid  -Bowel regimen in place    DVT prophylaxis: Heparin drip  Medical and mechanical DVT prophylaxis orders are present.    CODE STATUS:   Code Status (Patient has no pulse and is not breathing): CPR (Attempt to Resuscitate)  Medical Interventions (Patient has pulse or is breathing): Full Support    ILing PA-C, spent 15 minutes critical care time in accordance to split shared billing.  Electronically signed by BLAIR High, 10/18/23, 11:39 AM EDT.      Patient is critically ill in ICU with acute hypoxic and hypercapnic respiratory failure, possible pneumonia, aspiration, altered mental status, pulmonary embolism.  We spent 33 minutes of critical care time, excluding any procedure notes, in review, analysis, obtaining history and physical, formulating care plan, and I led multi-disciplinary critical care rounds with bedside nurse, respiratory therapist, clinical pharmacist and other allied services. I have discussed the case with primary  service and other consultants as well. I, Dr Francis, spent 18 mins of critical care time according to split shared critical care billing guidelines.     Electronically signed by Chong Francis MD, 10/18/23, 12:59 PM EDT.

## 2023-10-18 NOTE — PLAN OF CARE
Goal Outcome Evaluation:      FUNCTIONAL ASSESSMENT INSTRUMENT: Patient currently scored a level 3  of 7 on Functional Communication Measures for swallowing indicating a 60-79% limitation in function.     ASSESSMENT/ PLAN OF CARE:  Pt presents with limitations, noted below, that impede her ability to swallow safely. The skills of a therapist will be required to safely and effectively implement the following treatment plan to restore maximal level of function.     PROBLEMS:  1.  Dysphagia              LTG 1: (30 days) patient will increase ability to tolerate least restrictive diet and improve functional communication measure for swallowing to a 5 of 7              STG 1a: (14 days) patient will tolerate nectar thick liquids without clinical sign or symptom of aspiration with 8 of 10 trials.              STG 1b: (14 days) patient will tolerate therapeutic trials of thin, purée and soft solids without clinical sign or symptom of aspiration with 8 of 10 trials.              STG 1c: (14 days) patient/family teaching regarding diet recommendations, safe swallow strategies and signs and symptoms of aspiration.                            TREATMENT: Dysphagia therapy to ensure diet tolerance, advance to least restrictive diet and analyze aspiration risk     FREQUENCY/DURATION: Twice daily 5 times a week     REHAB POTENTIAL:  Pt has good rehab potential.  The following limitations may influence improvement/ length of tx medical status.     RECOMMENDATIONS:   1.   DIET: Continue core track as primary source of nutrition/hydration.  If patient is fully awake and alert and requesting, may have spoonfed sips of nectar thick clear liquids if seated 90 degrees upright.      Speech therapy will follow-up in the a.m. and reassess readiness to advance diet.     Pt/responsible party agrees with plan of care and has been informed of all alternatives, risks and benefits.

## 2023-10-18 NOTE — SIGNIFICANT NOTE
10/18/23 1030   Coping/Psychosocial   Observed Emotional State calm;cooperative   Verbalized Emotional State relief;hopefulness   Trust Relationship/Rapport empathic listening provided   Family/Support Persons spouse;daughter;family   Involvement in Care interacting with patient   Additional Documentation Spiritual Care (Group)   Spiritual Care   Use of Spiritual Resources non-Yarsanism use of spiritual care   Spiritual Care Source  initiative   Spiritual Care Follow-Up follow-up, none required as presently assessed   Response to Spiritual Care receptive of support;engaged in conversation;thanks expressed   Spiritual Care Interventions supportive conversation provided   Spiritual Care Visit Type initial   Receptivity to Spiritual Care visit welcomed

## 2023-10-19 LAB
1,3 BETA GLUCAN SER-MCNC: <31 PG/ML
ALBUMIN SERPL-MCNC: 3.1 G/DL (ref 3.5–5.2)
ALBUMIN/GLOB SERPL: 1.2 G/DL
ALP SERPL-CCNC: 94 U/L (ref 39–117)
ALT SERPL W P-5'-P-CCNC: 10 U/L (ref 1–33)
ANION GAP SERPL CALCULATED.3IONS-SCNC: 8 MMOL/L (ref 5–15)
AST SERPL-CCNC: 12 U/L (ref 1–32)
BACTERIA SPEC AEROBE CULT: ABNORMAL
BACTERIA SPEC AEROBE CULT: ABNORMAL
BACTERIA SPEC RESP CULT: ABNORMAL
BACTERIA SPEC RESP CULT: ABNORMAL
BASOPHILS # BLD AUTO: 0.07 10*3/MM3 (ref 0–0.2)
BASOPHILS NFR BLD AUTO: 0.7 % (ref 0–1.5)
BILIRUB SERPL-MCNC: 0.7 MG/DL (ref 0–1.2)
BUN SERPL-MCNC: 18 MG/DL (ref 8–23)
BUN/CREAT SERPL: 23.7 (ref 7–25)
CALCIUM SPEC-SCNC: 9.3 MG/DL (ref 8.6–10.5)
CHLORIDE SERPL-SCNC: 108 MMOL/L (ref 98–107)
CO2 SERPL-SCNC: 24 MMOL/L (ref 22–29)
CREAT SERPL-MCNC: 0.76 MG/DL (ref 0.57–1)
DEPRECATED RDW RBC AUTO: 44.3 FL (ref 37–54)
EGFRCR SERPLBLD CKD-EPI 2021: 78.8 ML/MIN/1.73
EOSINOPHIL # BLD AUTO: 0.52 10*3/MM3 (ref 0–0.4)
EOSINOPHIL NFR BLD AUTO: 5.3 % (ref 0.3–6.2)
ERYTHROCYTE [DISTWIDTH] IN BLOOD BY AUTOMATED COUNT: 13 % (ref 12.3–15.4)
GLOBULIN UR ELPH-MCNC: 2.5 GM/DL
GLUCOSE SERPL-MCNC: 113 MG/DL (ref 65–99)
GRAM STN SPEC: ABNORMAL
HCT VFR BLD AUTO: 33.8 % (ref 34–46.6)
HGB BLD-MCNC: 10.9 G/DL (ref 12–15.9)
IMM GRANULOCYTES # BLD AUTO: 0.07 10*3/MM3 (ref 0–0.05)
IMM GRANULOCYTES NFR BLD AUTO: 0.7 % (ref 0–0.5)
LYMPHOCYTES # BLD AUTO: 0.97 10*3/MM3 (ref 0.7–3.1)
LYMPHOCYTES NFR BLD AUTO: 9.9 % (ref 19.6–45.3)
MAGNESIUM SERPL-MCNC: 1.8 MG/DL (ref 1.6–2.4)
MCH RBC QN AUTO: 29.8 PG (ref 26.6–33)
MCHC RBC AUTO-ENTMCNC: 32.2 G/DL (ref 31.5–35.7)
MCV RBC AUTO: 92.3 FL (ref 79–97)
MONOCYTES # BLD AUTO: 0.76 10*3/MM3 (ref 0.1–0.9)
MONOCYTES NFR BLD AUTO: 7.7 % (ref 5–12)
NEUTROPHILS NFR BLD AUTO: 7.42 10*3/MM3 (ref 1.7–7)
NEUTROPHILS NFR BLD AUTO: 75.7 % (ref 42.7–76)
NRBC BLD AUTO-RTO: 0 /100 WBC (ref 0–0.2)
PHOSPHATE SERPL-MCNC: 1.7 MG/DL (ref 2.5–4.5)
PLATELET # BLD AUTO: 173 10*3/MM3 (ref 140–450)
PMV BLD AUTO: 10.6 FL (ref 6–12)
POTASSIUM SERPL-SCNC: 3.9 MMOL/L (ref 3.5–5.2)
PROT SERPL-MCNC: 5.6 G/DL (ref 6–8.5)
QT INTERVAL: 439 MS
QTC INTERVAL: 516 MS
RBC # BLD AUTO: 3.66 10*6/MM3 (ref 3.77–5.28)
SODIUM SERPL-SCNC: 140 MMOL/L (ref 136–145)
WBC NRBC COR # BLD: 9.81 10*3/MM3 (ref 3.4–10.8)

## 2023-10-19 PROCEDURE — 80053 COMPREHEN METABOLIC PANEL: CPT | Performed by: INTERNAL MEDICINE

## 2023-10-19 PROCEDURE — 25010000002 CEFAZOLIN IN DEXTROSE 2-4 GM/100ML-% SOLUTION: Performed by: INTERNAL MEDICINE

## 2023-10-19 PROCEDURE — 83735 ASSAY OF MAGNESIUM: CPT | Performed by: INTERNAL MEDICINE

## 2023-10-19 PROCEDURE — 97116 GAIT TRAINING THERAPY: CPT

## 2023-10-19 PROCEDURE — 25010000002 ENOXAPARIN PER 10 MG: Performed by: INTERNAL MEDICINE

## 2023-10-19 PROCEDURE — 94660 CPAP INITIATION&MGMT: CPT

## 2023-10-19 PROCEDURE — 92526 ORAL FUNCTION THERAPY: CPT

## 2023-10-19 PROCEDURE — 94799 UNLISTED PULMONARY SVC/PX: CPT

## 2023-10-19 PROCEDURE — 99233 SBSQ HOSP IP/OBS HIGH 50: CPT | Performed by: INTERNAL MEDICINE

## 2023-10-19 PROCEDURE — 85025 COMPLETE CBC W/AUTO DIFF WBC: CPT | Performed by: INTERNAL MEDICINE

## 2023-10-19 PROCEDURE — 99233 SBSQ HOSP IP/OBS HIGH 50: CPT | Performed by: STUDENT IN AN ORGANIZED HEALTH CARE EDUCATION/TRAINING PROGRAM

## 2023-10-19 PROCEDURE — 84100 ASSAY OF PHOSPHORUS: CPT | Performed by: INTERNAL MEDICINE

## 2023-10-19 RX ORDER — ROPINIROLE 1 MG/1
3 TABLET, FILM COATED ORAL NIGHTLY
Status: DISCONTINUED | OUTPATIENT
Start: 2023-10-19 | End: 2023-10-21

## 2023-10-19 RX ADMIN — CHLORHEXIDINE GLUCONATE 15 ML: 1.2 RINSE ORAL at 22:00

## 2023-10-19 RX ADMIN — DOCUSATE SODIUM 50MG AND SENNOSIDES 8.6MG 2 TABLET: 8.6; 5 TABLET, FILM COATED ORAL at 22:00

## 2023-10-19 RX ADMIN — Medication 10 ML: at 22:35

## 2023-10-19 RX ADMIN — ACETAMINOPHEN 650 MG: 325 TABLET ORAL at 22:08

## 2023-10-19 RX ADMIN — ENOXAPARIN SODIUM 100 MG: 100 INJECTION SUBCUTANEOUS at 11:53

## 2023-10-19 RX ADMIN — Medication 10 ML: at 08:09

## 2023-10-19 RX ADMIN — DOCUSATE SODIUM 50MG AND SENNOSIDES 8.6MG 2 TABLET: 8.6; 5 TABLET, FILM COATED ORAL at 08:09

## 2023-10-19 RX ADMIN — CEFAZOLIN SODIUM 2000 MG: 2 INJECTION, SOLUTION INTRAVENOUS at 06:08

## 2023-10-19 RX ADMIN — ROPINIROLE HYDROCHLORIDE 3 MG: 1 TABLET, FILM COATED ORAL at 22:00

## 2023-10-19 RX ADMIN — CEFAZOLIN SODIUM 2000 MG: 2 INJECTION, SOLUTION INTRAVENOUS at 22:08

## 2023-10-19 RX ADMIN — CEFAZOLIN SODIUM 2000 MG: 2 INJECTION, SOLUTION INTRAVENOUS at 16:57

## 2023-10-19 RX ADMIN — MIDODRINE HYDROCHLORIDE 10 MG: 10 TABLET ORAL at 11:54

## 2023-10-19 RX ADMIN — FAMOTIDINE 20 MG: 20 TABLET, FILM COATED ORAL at 22:00

## 2023-10-19 RX ADMIN — MIDODRINE HYDROCHLORIDE 10 MG: 10 TABLET ORAL at 04:12

## 2023-10-19 NOTE — THERAPY TREATMENT NOTE
Acute Care - Speech Language Pathology   Swallow Treatment Note ABILIO James     Patient Name: Chula Adkins  : 1940  MRN: [id number]  Today's Date: 10/19/2023               Admit Date: 10/16/2023    Visit Dx:     ICD-10-CM ICD-9-CM   1. Altered mental status, unspecified altered mental status type  R41.82 780.97   2. Difficulty walking  R26.2 719.7     Patient Active Problem List   Diagnosis    Acute respiratory failure with hypoxia     History reviewed. No pertinent past medical history.  History reviewed. No pertinent surgical history.  SPEECH PATHOLOGY DYSPHAGIA TREATMENT    Subjective/Behavioral Observations: Patient seen for dysphagia therapy      Current Diet: Tube feeds      Treatment received: Patient much more awake and alert.  Still with diminished speech intelligibility.  Cued for slow rate of articulate.  Therapeutic trials of thin liquids, nectar thick liquids, purée and soft solids completed.  Reduced bolus control noted with thin liquids with anterior loss.  Swallow completed with thin liquids with intermittent wet throat clearing and wet voicing noted.  Appears to tolerate nectar thick liquid trials without clinical sign or symptom of aspiration.  Tolerates purée and soft solids without clinical sign or symptom of aspiration.  Denies globus sensation after completion of swallow    Results of treatment: As stated    Progress toward goals: Progress noted    Barriers to Achieving goals: Medical status    Plan of care:/changes in plan: Initiate mechanical soft diet-nectar thick liquids  90 degrees upright for all intake  Slow rate, small bites/drinks  No straws  Oral meds whole in applesauce  Feed only when awake and alert  Monitor for signs of fatigue and discontinue if noted.      EDUCATION  The patient has been educated in the following areas:   Dysphagia (Swallowing Impairment).          Cesilia Courtney, SLP  10/19/2023

## 2023-10-19 NOTE — NURSING NOTE
"Visited patient in room on 4 MTU. She was asleep but chucky up when spoken to. She is alert and oriented. She c/o feeling tired and right shoulder pain.  She has 02 @ 3 L via NC. Cortrack intact for artificial nutrition. F/c for I & O.   Discussion r/t her code status and the course of treatment during her hospitalization. She wants to remain full code with all interventions. Her  is in agreement and does not want to \"think about her dying\".   No other questions or concerns at this time.  Palliative care to continue to follow and support as needed    Shikha MINOR RN, BSN  Palliative Care  "

## 2023-10-19 NOTE — PLAN OF CARE
Goal Outcome Evaluation:  Plan of Care Reviewed With: patient        Progress: no change  Outcome Evaluation: Patient wore BiPAP for a couple of hours tonight then refused to wear any longer.

## 2023-10-19 NOTE — PROGRESS NOTES
Nutrition Services    Patient Name: Chula Adkins  YOB: 1940  MRN: [id number]  Admission date: 10/16/2023    PROGRESS NOTE      Encounter Information: EN follow up       PO Diet: NPO Diet NPO Type: Strict NPO   PO Supplements: N/A   PO Intake:  N/A       Current nutrition support: Fibersource HN @ 65 ml/hr x 22 hrs  Free water flushes 120 ml q3h (960 ml)  Total Provided: 1716 kcal, 77g pro, 2118 ml fluid     Nutrition support review: No intolerances or residuals noted.       Labs (reviewed below): Phos low 1.7, phos replacement ordered prn.       GI Function:  Last BM noted on 10/19/23.       Nutrition Intervention Updates: SLP evaluated this morning, recommending mechanical soft foods with nectar thickened liquids. RD reached out to MD, RD to d/c Cortrak and tube feeds. Will place diet order with texture modifications. Feed only when awake and alert.     Results from last 7 days   Lab Units 10/19/23  0618 10/18/23  0329 10/18/23  0242 10/17/23  1012 10/17/23  0045 10/16/23  0807   SODIUM mmol/L 140 143  --   --  142 138   SODIUM, ARTERIAL mmol/L  --   --  141.5   < >  --   --    POTASSIUM mmol/L 3.9 3.7  --   --  4.0 4.8   CHLORIDE mmol/L 108* 114*  --   --  112* 107   CO2 mmol/L 24.0 20.3*  --   --  20.3* 18.4*   BUN mg/dL 18 32*  --   --  50* 54*   CREATININE mg/dL 0.76 1.04*  --   --  1.31* 1.20*   CALCIUM mg/dL 9.3 8.9  --   --  8.4* 8.0*   BILIRUBIN mg/dL 0.7  --   --   --   --  0.2   ALK PHOS U/L 94  --   --   --   --  60   ALT (SGPT) U/L 10  --   --   --   --  11   AST (SGOT) U/L 12  --   --   --   --  10   GLUCOSE mg/dL 113* 133*  --   --  90 121*   GLUCOSE, ARTERIAL mg/dL  --   --  140*   < >  --   --     < > = values in this interval not displayed.     Results from last 7 days   Lab Units 10/19/23  0618 10/18/23  0329 10/17/23  1325   MAGNESIUM mg/dL 1.8 2.2 1.9   PHOSPHORUS mg/dL 1.7* 2.0*  --    HEMOGLOBIN g/dL 10.9* 10.9*  --    HEMATOCRIT % 33.8* 35.2  --      No results found  "for: \"COVID19\"  Lab Results   Component Value Date    HGBA1C 5.70 (H) 10/17/2023       RD to follow up per protocol.    Electronically signed by:  Shikha Murdock RD  10/19/23 13:21 EDT    "

## 2023-10-19 NOTE — PROGRESS NOTES
Pulmonary / Critical Care Progress Note      Patient Name: Chula Adkins  : 1940  MRN: [id number]  Attending:  Prasad Roger DO   Date of admission: 10/16/2023    Subjective   Subjective   Follow-up for respiratory failure, pneumonia, pulmonary embolism    No acute events overnight    This morning,  Lying in bed on 3 L nasal cannula  Drowsy but arousable  Intermittently confused  Reports cough with sputum  Core track in place  Remains weak and fatigued  Afebrile    Review of Systems  General: Fatigue, otherwise denied complaints  HEENT: Denied complaints  Respiratory: Cough, otherwise denied complaints  Cardiovascular: Denied complaints  GI: Denied complaints  : Denied complaints  MSK: Weakness, otherwise denied complaints      Objective   Objective     Vitals:   Vital signs for last 24 hours:  Temp:  [97.3 °F (36.3 °C)-100 °F (37.8 °C)] 97.9 °F (36.6 °C)  Heart Rate:  [83-97] 83  Resp:  [18-22] 22  BP: (124-138)/(66-87) 126/66    Physical Exam   Vital Signs Reviewed   General:  Alert, NAD.  Chronically ill-appearing female, lying in bed   HEENT:  PERRL, EOMI. NG tube  Neck:  No JVD, no thyromegaly  Lymph: no axillary, cervical, supraclavicular lymphadenopathy noted bilaterally  Chest:  Clear to auscultation bilaterally, no work of breathing noted on 4L NC  CV: RRR, no M/G/R, pulses 2+  Abd:  Soft, NT, ND, +BS  EXT:  no clubbing, no cyanosis, no edema  Neuro:  A&Ox1-2, CN grossly intact, no focal deficits.  Skin: No rashes or lesions noted    Result Review    Result Review:  I have personally reviewed the results from the time of this admission to 10/19/2023 11:39 EDT and agree with these findings:  [x]  Laboratory  [x]  Microbiology  [x]  Radiology  []  EKG/Telemetry   [x]  Cardiology/Vascular   []  Pathology  []  Old records  []  Other:  Most notable findings include:     BAL and sputum culture positive for MSSA  Pneumonia panel positive for MSSA     CT chest - worsening consolidation in  lower lungs, PE noted in subsegmental left lower lobe pulmonary arterial branch and segmental left upper lobe branch    MRI brain with abnormal appearance of left maxillary antrum and left nasal cavity, 1 differential consideration would be antrochoanal polyp or an associated fungus ball        Lab 10/19/23  0618 10/18/23  0329 10/18/23  0242 10/17/23  1410 10/17/23  1012 10/17/23  0045 10/16/23  0807   WBC 9.81 9.83  --   --   --  16.23* 5.38   HEMOGLOBIN 10.9* 10.9*  --   --   --  12.6 9.8*   HEMATOCRIT 33.8* 35.2  --   --   --  41.1 32.0*   PLATELETS 173 133*  --   --   --  184 154   SODIUM 140 143  --   --   --  142 138   SODIUM, ARTERIAL  --   --  141.5 140.8 142.5  --   --    POTASSIUM 3.9 3.7  --   --   --  4.0 4.8   CHLORIDE 108* 114*  --   --   --  112* 107   CO2 24.0 20.3*  --   --   --  20.3* 18.4*   BUN 18 32*  --   --   --  50* 54*   CREATININE 0.76 1.04*  --   --   --  1.31* 1.20*   GLUCOSE 113* 133*  --   --   --  90 121*   GLUCOSE, ARTERIAL  --   --  140* 98 103*  --   --    CALCIUM 9.3 8.9  --   --   --  8.4* 8.0*   PHOSPHORUS 1.7* 2.0*  --   --   --  3.4 4.1   TOTAL PROTEIN 5.6*  --   --   --   --   --  4.8*   ALBUMIN 3.1*  --   --   --   --   --  3.1*   GLOBULIN 2.5  --   --   --   --   --  1.7     Assessment & Plan   Assessment / Plan     Active Hospital Problems:  Active Hospital Problems    Diagnosis     **Acute respiratory failure with hypoxia      Impression:  Acute hypoxic and hypercapnic respiratory failure  Altered mental status, toxic metabolic encephalopathy  MSSA pneumonia  Concern for aspiration  Pulmonary embolism  History of hypothyroidism  Restless leg syndrome  Chronic pain syndrome    Plan:  -Currently on 3 L nasal cannula.  Continue to wean to maintain SPO2 greater than 90%  -Continue NIPPV as needed  -7/16 CT chest - worsening consolidation in lower lungs, PE noted in subsegmental left lower lobe pulmonary arterial branch and segmental left upper lobe branch  -Continue  therapeutic Lovenox for pulmonary embolism  -Continue Ancef for MSSA pneumonia  -Continue bronchodilator and bronchopulmonary hygiene  -Continue as needed albuterol  -Echocardiogram with EF of 58%, normal diastolic dysfunction and mildly elevated RVSP at 35-45  -MRI of brain showed an area of concern of the left maxillary antrum differential polyp versus fungus ball.  ENT on board.  Appreciate assistance.  -EEG showed encephalopathy 10/16  -SLP on board.  Appreciate assistance.  Concern for aspiration.  -Dietitian on board.  Cortrak in place.  Tube feeds and free water per them  -Trend renal function and electrolytes.  Replace as needed  -PT/OT on board.  Appreciate assistance.  -Encourage mobilization as able.  Out of bed to chair.    DVT prophylaxis: Heparin drip  Medical and mechanical DVT prophylaxis orders are present.    CODE STATUS:   Code Status (Patient has no pulse and is not breathing): CPR (Attempt to Resuscitate)  Medical Interventions (Patient has pulse or is breathing): Full Support    Electronically signed by KATHY Lee, 10/19/23, 11:39 AM EDT.  This patient was seen by both a physician and a NP. IJavier MD, spent >50% of time in accordance with split shared billing. This included personally reviewing all pertinent labs, imaging, microbiology and documentation. Also discussing the case with the patient and any available family, the admitting physician and any available ancillary staff.   Electronically signed by Javier Nuñez MD, 10/19/23, 12:31 PM EDT.

## 2023-10-19 NOTE — PLAN OF CARE
Goal Outcome Evaluation:  Plan of Care Reviewed With: patient        Progress: no change  Outcome Evaluation: Patient on 3L, bipap on standby if needed.

## 2023-10-19 NOTE — PLAN OF CARE
Goal Outcome Evaluation:  Plan of Care Reviewed With: patient, family        Progress: no change  Outcome Evaluation: Patient received from CCU, tube feeding per Eleonora per orders infusing. Bed alert placed. Family at bedside. Pt able to assist with turning. Speech garbled, dentures at bedside. Pt has been lethargic and unable to keep them in. Pt needing reorientation/redirection. Call light within reach and pt states she understands how to use it. Continue with plan of care.

## 2023-10-19 NOTE — THERAPY TREATMENT NOTE
Acute Care - Physical Therapy Treatment Note   Erika     Patient Name: Chula Adkins  : 1940  MRN: [id number]  Today's Date: 10/19/2023      Visit Dx:     ICD-10-CM ICD-9-CM   1. Altered mental status, unspecified altered mental status type  R41.82 780.97   2. Difficulty walking  R26.2 719.7     Patient Active Problem List   Diagnosis    Acute respiratory failure with hypoxia     History reviewed. No pertinent past medical history.  History reviewed. No pertinent surgical history.  PT Assessment (last 12 hours)       PT Evaluation and Treatment       [facility] Name 10/19/23 [facility]          Physical Therapy Time and Intention    Subjective Information no complaints (P)   -AM     Document Type therapy note (daily note) (P)   -AM     Mode of Treatment individual therapy;physical therapy (P)   -AM     Patient Effort good (P)   -AM     Symptoms Noted During/After Treatment none (P)   -AM       [facility] Name 10/19/23 1415          General Information    Patient Observations alert;cooperative;agree to therapy (P)   -AM       [facility] Name 10/19/23 1415          Bed Mobility    Bed Mobility supine-sit;sit-supine (P)   -AM     Supine-Sit [place] (Bed Mobility) moderate assist (50% patient effort) (P)   -AM     Sit-Supine [place] (Bed Mobility) moderate assist (50% patient effort) (P)   -AM       [facility] Name 10/19/23 1415          Transfers    Transfers sit-stand transfer;stand-sit transfer (P)   -AM       Row Name 10/19/23 1415          Sit-Stand Transfer    Sit-Stand [place] (Transfers) contact guard (P)   -AM     Assistive Device (Sit-Stand Transfers) walker, front-wheeled (P)   -AM       Row Name 10/19/23 1415          Stand-Sit Transfer    Stand-Sit [place] (Transfers) contact guard (P)   -AM     Assistive Device (Stand-Sit Transfers) walker, front-wheeled (P)   -AM       Row Name 10/19/23 1415          Gait/Stairs (Locomotion)    Gait/Stairs Locomotion gait/ambulation assistive device (P)   -AM      [place] Level (Gait) contact guard (P)   -AM     Assistive Device (Gait) walker, front-wheeled (P)   -AM     Patient was able to Ambulate yes (P)   -AM     Distance in Feet (Gait) 25 (P)   -AM     Pattern (Gait) step-through (P)   -AM     Deviations/Abnormal Patterns (Gait) gait speed decreased;stride length decreased (P)   -AM       Row Name 10/19/23 1415          Balance    Balance Assessment standing dynamic balance (P)   -AM     Dynamic Standing Balance contact guard (P)   -AM       Row Name 10/19/23 1415          Progress Summary (PT)    Progress Toward Functional Goals (PT) progress toward functional goals is good (P)   -AM               User Key  (r) = Recorded By, (t) = Taken By, (c) = Cosigned By      Initials Name Provider Type    AM Maggy Singer PT Student PT Student                    Physical Therapy Education       Title: PT OT SLP Therapies (Done)       Topic: Physical Therapy (Done)       Point: Mobility training (Done)       Learning Progress Summary             Patient Acceptance, E,TB, VU by  at 10/19/2023 1057    Acceptance, E,TB, VU by  at 10/18/2023 1329                         Point: Home exercise program (Done)       Learning Progress Summary             Patient Acceptance, E,TB, VU by  at 10/19/2023 1057                         Point: Body mechanics (Done)       Learning Progress Summary             Patient Acceptance, E,TB, VU by  at 10/19/2023 1057    Acceptance, E,TB, VU by  at 10/18/2023 1329                         Point: Precautions (Done)       Learning Progress Summary             Patient Acceptance, E,TB, VU by  at 10/19/2023 1057    Acceptance, E,TB, VU by  at 10/18/2023 1329                                         User Key       Initials Effective Dates Name Provider Type Discipline     06/11/21 -  Terrell Mcelroy, PT Physical Therapist PT     09/12/22 -  Kim Longoria, RN Registered Nurse Nurse                  PT Recommendation and Plan     Progress  Summary (PT)  Progress Toward Functional Goals (PT): (P) progress toward functional goals is good   Outcome Measures       Row Name 10/19/23 1400 10/18/23 1300          How much help from another person do you currently need...    Turning from your back to your side while in flat bed without using bedrails? 3 (P)   -AM 1  -AV     Moving from lying on back to sitting on the side of a flat bed without bedrails? 3 (P)   -AM 1  -AV     Moving to and from a bed to a chair (including a wheelchair)? 3 (P)   -AM 1  -AV     Standing up from a chair using your arms (e.g., wheelchair, bedside chair)? 3 (P)   -AM 1  -AV     Climbing 3-5 steps with a railing? 2 (P)   -AM 1  -AV     To walk in hospital room? 3 (P)   -AM 1  -AV     AM-PAC 6 Clicks Score (PT) 17 (P)   -AM 6  -AV     Highest level of mobility 5 --> Static standing (P)   -AM 2 --> Bed activities/dependent transfer  -AV        Functional Assessment    Outcome Measure Options AM-PAC 6 Clicks Basic Mobility (PT) (P)   -AM AM-PAC 6 Clicks Basic Mobility (PT)  -AV               User Key  (r) = Recorded By, (t) = Taken By, (c) = Cosigned By      Initials Name Provider Type    AV Terrell Mcelroy, PT Physical Therapist    Maggy Briceño, PT Student PT Student                     Time Calculation:    PT Charges       Row Name 10/19/23 1450             Timed Charges    77957 - Gait Training Minutes  10 (P)   -AM      98548 - PT Therapeutic Activity Minutes 5 (P)   -AM         Total Minutes    Timed Charges Total Minutes 15 (P)   -AM       Total Minutes 15 (P)   -AM                User Key  (r) = Recorded By, (t) = Taken By, (c) = Cosigned By      Initials Name Provider Type    AM Maggy Singer, PT Student PT Student                  Therapy Charges for Today       Code Description Service Date Service Provider Modifiers Qty    [id number] HC GAIT TRAINING EA 15 MIN 10/19/2023 Maggy Singer, PT Student GP 1            PT G-Codes  Outcome Measure Options: (P) AM-PAC  6 Clicks Basic Mobility (PT)  AM-[facility] 6 Clicks Score (PT): (P) 17  AM-[facility] 6 Clicks Score (OT): 6    Maggy Singer PT Student  10/19/2023

## 2023-10-19 NOTE — PLAN OF CARE
Goal Outcome Evaluation:  Plan of Care Reviewed With: patient   Patient is alert and oriented x 4. NSR. VSS. NG tube discontinued. Patient is on regular house diet - soft to chew- ground meat, nectar thick liquids. No complaints of pain or SOA noted. Family at bedside. Patient tolerated dinner well. Will continue plan of care.     Progress: improving

## 2023-10-19 NOTE — SIGNIFICANT NOTE
"   10/19/23 1438   Spiritual Care   Use of Spiritual Resources non-Sabianism use of spiritual care;spirituality for coping, indicated strong use of   Spiritual Care Source  initiative   Spiritual Care Follow-Up follow-up planned regularly for general support   Response to Spiritual Care receptive of support;thanks expressed;engaged in conversation;emotion expressed   Spiritual Care Interventions supportive conversation provided   Spiritual Care Visit Type follow-up   Spiritual Care Request coping/stress of illness support;family support;hospitality support;spiritual/moral support   Receptivity to Spiritual Care visit welcomed     Pt currently on 4MTU with family visiting at bedside. Pt in good spirits today and expressed she was feeling \"blessed\" today. No needs expressed at time of visit.   "

## 2023-10-19 NOTE — PROGRESS NOTES
[facility]   Progress Note    Patient Name: Chula Adkins  : 1940  MRN: [id number]  Primary Care Physician:  Owen Mathias MD  Date of admission: 10/16/2023    Subjective   Subjective     Chief Complaint: f/u     History of Present Illness  Patient Reports still feeling short of breath at times and overall weak        Objective   Objective     Vitals:   Temp:  [97.3 °F (36.3 °C)-99.5 °F (37.5 °C)] 98.4 °F (36.9 °C)  Heart Rate:  [83-97] 94  Resp:  [21-22] 22  BP: (124-144)/(66-87) 144/75  Flow (L/min):  [3-4] 3    Physical Exam  Vitals reviewed.   Cardiovascular:      Rate and Rhythm: Normal rate.   Pulmonary:      Effort: Respiratory distress present.      Breath sounds: Wheezing present.   Abdominal:      General: There is no distension.   Musculoskeletal:      Right lower leg: No edema.   Neurological:      Mental Status: She is alert. Mental status is at baseline.          Result Review    Result Review:  I have personally reviewed the results from the time of this admission to 10/19/2023 14:40 EDT and agree with these findings:  [x]  Laboratory list / accordion  [x]  Microbiology  [x]  Radiology  []  EKG/Telemetry   []  Cardiology/Vascular   []  Pathology  []  Old records  []  Other:        Assessment & Plan   Assessment / Plan     Active Hospital Problems:  Active Hospital Problems    Diagnosis     **Acute respiratory failure with hypoxia      Plan:     Severe sepsis with shock.  Improved   Acute hypoxemic and hypercapnic respiratory failure.No home oxygen use.Due to pneumonia and mucous plugging.  Extubated .  Improving  Right more than left basilar pneumonia likely aspiration.MSSA.S/p bronchoscopy.  Left upper lobe segmental and left lower lobe subsegmental acute PE.  Unprovoked first episode.     hypothermia.  Renal insufficiency.  Baseline not known.  Gas in bilateral subclavian and brachiocephalic vein.  Likely from intravenous intervention in the ED or by EMS.   Improving.  No intervention as per vascular.  Chronic pain syndrome on Percocet.  Although it is listed as an allergy.  Restless leg syndrome.  History of diabetes mellitus diet controlled.  Hemoglobin A1c of 5.7%  Hypertension diet controlled.  Hypothyroidism.  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  Mild thrombocytopenia likely from sepsis.  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 Appreciate input.  Will evaluate patient in October 28 with a scope.  Per ENT hold off CT scan of sinuses.  ENT okay with stopping IV itraconazole.  Doubt she has mycetoma..            Pulmonary following appreciate input.  S/p bronchoscopy  Cx growing MSSA. Continue IV Ancef.     MRSA PCR.  Negative.   Bronch culture and PCR positive for Staph aureus.  Continue therapeutic Lovenox and consider transition to PO NOAC soon if tolerating more feedings  Resume appropriate home medication.   vascular followed no intervention needed for gas in her subclavian and brachiocephalic veins.  improved on second CT scan.   2D echo.  Noted with EF of 58%  EEG no epileptiform discharges.  Diffuse encephalopathy.   As needed fentanyl.  ST cleared for diet, d/c NG tube and start feeding per ST recs. RD also following  PT OT ST  Continue Eng catheter    DVT prophylaxis:  Medical and mechanical DVT prophylaxis orders are present.    CODE STATUS:    Code Status (Patient has no pulse and is not breathing): CPR (Attempt to Resuscitate)  Medical Interventions (Patient has pulse or is breathing): Full Support    Disposition:  ultimately will likely need inpt rehab once more stable    Electronically signed by Prasad Roger DO, 10/19/23, 2:54 PM EDT.

## 2023-10-20 LAB
ALBUMIN SERPL-MCNC: 2.9 G/DL (ref 3.5–5.2)
ALBUMIN/GLOB SERPL: 1 G/DL
ALP SERPL-CCNC: 112 U/L (ref 39–117)
ALT SERPL W P-5'-P-CCNC: 8 U/L (ref 1–33)
ANION GAP SERPL CALCULATED.3IONS-SCNC: 6.4 MMOL/L (ref 5–15)
AST SERPL-CCNC: 12 U/L (ref 1–32)
BILIRUB SERPL-MCNC: 0.9 MG/DL (ref 0–1.2)
BUN SERPL-MCNC: 14 MG/DL (ref 8–23)
BUN/CREAT SERPL: 17.9 (ref 7–25)
CALCIUM SPEC-SCNC: 9.5 MG/DL (ref 8.6–10.5)
CHLORIDE SERPL-SCNC: 106 MMOL/L (ref 98–107)
CO2 SERPL-SCNC: 26.6 MMOL/L (ref 22–29)
CREAT SERPL-MCNC: 0.78 MG/DL (ref 0.57–1)
EGFRCR SERPLBLD CKD-EPI 2021: 76.4 ML/MIN/1.73
GALACTOMANNAN AG SPEC IA-ACNC: 0.04 INDEX (ref 0–0.49)
GLOBULIN UR ELPH-MCNC: 2.8 GM/DL
GLUCOSE SERPL-MCNC: 90 MG/DL (ref 65–99)
POTASSIUM SERPL-SCNC: 4.1 MMOL/L (ref 3.5–5.2)
PROT SERPL-MCNC: 5.7 G/DL (ref 6–8.5)
SODIUM SERPL-SCNC: 139 MMOL/L (ref 136–145)

## 2023-10-20 PROCEDURE — 99222 1ST HOSP IP/OBS MODERATE 55: CPT | Performed by: OTOLARYNGOLOGY

## 2023-10-20 PROCEDURE — 25010000002 ONDANSETRON PER 1 MG: Performed by: INTERNAL MEDICINE

## 2023-10-20 PROCEDURE — 94799 UNLISTED PULMONARY SVC/PX: CPT

## 2023-10-20 PROCEDURE — 99233 SBSQ HOSP IP/OBS HIGH 50: CPT | Performed by: STUDENT IN AN ORGANIZED HEALTH CARE EDUCATION/TRAINING PROGRAM

## 2023-10-20 PROCEDURE — 92526 ORAL FUNCTION THERAPY: CPT

## 2023-10-20 PROCEDURE — 99232 SBSQ HOSP IP/OBS MODERATE 35: CPT | Performed by: INTERNAL MEDICINE

## 2023-10-20 PROCEDURE — 97110 THERAPEUTIC EXERCISES: CPT

## 2023-10-20 PROCEDURE — 25010000002 CEFAZOLIN IN DEXTROSE 2-4 GM/100ML-% SOLUTION: Performed by: INTERNAL MEDICINE

## 2023-10-20 PROCEDURE — 80053 COMPREHEN METABOLIC PANEL: CPT | Performed by: INTERNAL MEDICINE

## 2023-10-20 PROCEDURE — 97116 GAIT TRAINING THERAPY: CPT

## 2023-10-20 PROCEDURE — 94640 AIRWAY INHALATION TREATMENT: CPT

## 2023-10-20 PROCEDURE — 25010000002 ENOXAPARIN PER 10 MG: Performed by: INTERNAL MEDICINE

## 2023-10-20 PROCEDURE — 94642 AEROSOL INHALATION TREATMENT: CPT

## 2023-10-20 PROCEDURE — 94761 N-INVAS EAR/PLS OXIMETRY MLT: CPT

## 2023-10-20 RX ORDER — GUAIFENESIN 200 MG/10ML
200 LIQUID ORAL EVERY 4 HOURS PRN
Status: DISCONTINUED | OUTPATIENT
Start: 2023-10-20 | End: 2023-10-21

## 2023-10-20 RX ORDER — ALBUTEROL SULFATE 2.5 MG/3ML
2.5 SOLUTION RESPIRATORY (INHALATION)
Status: DISCONTINUED | OUTPATIENT
Start: 2023-10-20 | End: 2023-10-25 | Stop reason: HOSPADM

## 2023-10-20 RX ADMIN — CEFAZOLIN SODIUM 2000 MG: 2 INJECTION, SOLUTION INTRAVENOUS at 06:13

## 2023-10-20 RX ADMIN — MIDODRINE HYDROCHLORIDE 10 MG: 10 TABLET ORAL at 14:29

## 2023-10-20 RX ADMIN — GUAIFENESIN 200 MG: 200 SOLUTION ORAL at 20:57

## 2023-10-20 RX ADMIN — CEFAZOLIN SODIUM 2000 MG: 2 INJECTION, SOLUTION INTRAVENOUS at 14:29

## 2023-10-20 RX ADMIN — MIDODRINE HYDROCHLORIDE 10 MG: 10 TABLET ORAL at 20:57

## 2023-10-20 RX ADMIN — ENOXAPARIN SODIUM 100 MG: 100 INJECTION SUBCUTANEOUS at 23:22

## 2023-10-20 RX ADMIN — ENOXAPARIN SODIUM 100 MG: 100 INJECTION SUBCUTANEOUS at 14:29

## 2023-10-20 RX ADMIN — ALBUTEROL SULFATE 2.5 MG: 2.5 SOLUTION RESPIRATORY (INHALATION) at 16:56

## 2023-10-20 RX ADMIN — Medication 10 ML: at 08:36

## 2023-10-20 RX ADMIN — ONDANSETRON 4 MG: 2 INJECTION INTRAMUSCULAR; INTRAVENOUS at 23:00

## 2023-10-20 RX ADMIN — FAMOTIDINE 20 MG: 20 TABLET, FILM COATED ORAL at 20:57

## 2023-10-20 RX ADMIN — ENOXAPARIN SODIUM 100 MG: 100 INJECTION SUBCUTANEOUS at 00:26

## 2023-10-20 RX ADMIN — Medication 10 ML: at 20:57

## 2023-10-20 RX ADMIN — DOCUSATE SODIUM 50MG AND SENNOSIDES 8.6MG 2 TABLET: 8.6; 5 TABLET, FILM COATED ORAL at 20:56

## 2023-10-20 RX ADMIN — ROPINIROLE HYDROCHLORIDE 3 MG: 1 TABLET, FILM COATED ORAL at 20:57

## 2023-10-20 RX ADMIN — CEFAZOLIN SODIUM 2000 MG: 2 INJECTION, SOLUTION INTRAVENOUS at 21:00

## 2023-10-20 NOTE — H&P
Patient Name: Chula Adkins   Visit Date: 10/16/2023   Patient ID: [id number]  Provider: Lasha Mota MD    Sex: female  Location: [facility]   YOB: 1940  Location Address: [address] [place] [address], [address] [place] [address],?[id number]    Primary Care Provider Owen Mathias MD  Location Phone: [phone]    Referring Provider: No ref. provider found        Chief Complaint  Loss of Consciousness    Subjective    History of Present Illness  Chula Adkins is a 83 y.o. female who presents to [facility] 4TH FLOOR MEDICAL TELEMETRY UNIT today as a consult from No ref. provider found.    She was evaluated at the bedside today for what appears to be Chronic Sinusitis and left maxillary sinus fungal ball as seen on CT head and MRI scans.  Patient notes no purulent drainage or facial pain or pressure.  Denies any known history of sinusitis.  Denies any foul smell in her nose.  Notes that she did have a small nosebleed this morning.  She has been on blood thinners recently.  Denies headaches.    CT scan and MRI results reviewed with patient and her .    HPI from hospitalist:    Chula Adkins is a 83 y.o. female with past medical history of diet-controlled diabetes mellitus and hypertension, restless leg syndrome, chronic pain syndrome on Percocet by pain management, hyperlipidemia and hypothyroidism.  History is obtained by patient and  at bedside as patient is intubated and unresponsive.  Patient was last seen well around 2:30 AM In Her Kitchen Talking to her cat.  No complaints of chest pain, fever, cough, shortness of air, nausea, vomiting, diarrhea or abdominal pain and no urinary complaints per .  Around 6 AM she was found unresponsive by her .  EMS found her to be hypoxic with O2 sat of 80%.  Work-up in the ED showed 64% saturation patient was hypothermic.  Patient was intubated in ED with no gag reflex.  There was  [place] found in her throat.  Afterwards she dropped her blood pressure to 77/59 requiring 2 L of fluid and IV Levophed.  Her temp was 91.7 started on warming blanket.  ABG showed pH of 7.19, PCO2 55, PO2 of 233, bicarb of 20, 99% saturation on 100% FiO2.  Troponin T is 15.  Creatinine is 1.2 baseline not available.  BUN of 54.  Lactic acid of 5.9.  TSH free T4 within normal range.  Hemoglobin of 9.8.  UA is negative.  EKG showed normal sinus rhythm.  CT scan of C-spine did not show any fractures it did show gas in bilateral subclavian and brachiocephalic vein.  CT head negative.  CT of the chest showed left upper lobe segmental and left lower lobe subsegmental PE with bilateral lower lobe consolidations more on the righ possibly aspiration.  Gas in the band is improving.  Her sputum and blood culture is pending.  Hospitalist has been called to admit her for further evaluation.  Per  patient has not been sedentary and her last surgical procedure sounded like a repair of rectal prolapse about 2 months ago.  When patient got to the floor she started to get more agitated.  She was restrained and started on heparin drip    History reviewed. No pertinent past medical history.    History reviewed. No pertinent surgical history.      Current Facility-Administered Medications:     acetaminophen (TYLENOL) tablet 650 mg, 650 mg, Nasogastric, Q4H PRN, 650 mg at 10/19/23 2208 **OR** acetaminophen (TYLENOL) 160 MG/5ML oral solution 650 mg, 650 mg, Nasogastric, Q4H PRN **OR** acetaminophen (TYLENOL) suppository 650 mg, 650 mg, Rectal, Q4H PRN, Husam Rey MD    albuterol (PROVENTIL) nebulizer solution 0.083% 2.5 mg/3mL, 2.5 mg, Nebulization, Q4H PRN, Husam Rey MD    sennosides-docusate (PERICOLACE) 8.6-50 MG per tablet 2 tablet, 2 tablet, Nasogastric, BID, 2 tablet at 10/19/23 2200 **AND** polyethylene glycol (MIRALAX) packet 17 g, 17 g, Nasogastric, Daily PRN **AND** [DISCONTINUED] bisacodyl (DULCOLAX) EC  "tablet 5 mg, 5 mg, Oral, Daily PRN **AND** bisacodyl (DULCOLAX) suppository 10 mg, 10 mg, Rectal, Daily PRN, Husam Rey MD    ceFAZolin in dextrose (ANCEF) IVPB solution 2,000 mg, 2,000 mg, Intravenous, Q8H, Husam Rey MD, 2,000 mg at 10/20/23 0613    chlorhexidine (PERIDEX) 0.12 % solution 15 mL, 15 mL, Mouth/Throat, Q12H, Husam Rey MD, 15 mL at 10/19/23 2200    Enoxaparin Sodium (LOVENOX) syringe 100 mg, 1 mg/kg, Subcutaneous, Q12H, Husam Rey MD, 100 mg at 10/20/23 0026    famotidine (PEPCID) tablet 20 mg, 20 mg, Nasogastric, Nightly, Husam Rey MD, 20 mg at 10/19/23 2200    Influenza Vac High-Dose Quad (FLUZONE HIGH DOSE) injection 0.7 mL, 0.7 mL, Intramuscular, During Hospitalization, Husam Rey MD    midodrine (PROAMATINE) tablet 10 mg, 10 mg, Nasogastric, Q8H, Husam Rey MD, 10 mg at 10/19/23 1154    nitroglycerin (NITROSTAT) SL tablet 0.4 mg, 0.4 mg, Sublingual, Q5 Min PRN, Husam Rey MD    ondansetron (ZOFRAN) injection 4 mg, 4 mg, Intravenous, Q6H PRN, Husam Rey MD    Phosphorus Replacement - Follow Nurse / BPA Driven Protocol, , Does not apply, PRN, Husam Rey MD    Potassium Replacement - Follow Nurse / BPA Driven Protocol, , Does not apply, Candido BURGOS Raza Ali, MD    rOPINIRole (REQUIP) tablet 3 mg, 3 mg, Nasogastric, Nightly, Prasad Roger DO, 3 mg at 10/19/23 2200    sodium chloride 0.9 % flush 10 mL, 10 mL, Intravenous, Q12H, Husam Rey MD, 10 mL at 10/20/23 0836    sodium chloride 0.9 % flush 10 mL, 10 mL, Intravenous, PRN, Husam Rey MD    sodium chloride 0.9 % infusion 40 mL, 40 mL, Intravenous, PRN, Husam Rey MD     No Active Allergies    History reviewed. No pertinent family history.     Social History     Social History Narrative    Not on file       Objective     Vital Signs:   /80 (BP Location: Left arm, Patient Position: Lying)   Pulse 75   Temp 97.3 °F (36.3 °C) (Oral)   Resp 20   Ht 162.6 cm (64\")  "  Wt 94.6 kg (208 lb 8.9 oz)   SpO2 99%   BMI 35.80 kg/m²       Physical Exam    Constitutional   Appearance  : well developed, well-nourished, alert and in no acute distress, voice clear and strong    Head  Inspection  : no deformities or lesions  Face  Inspection  : No facial lesions; House-Brackmann I/VI bilaterally  Palpation  : No TMJ crepitus nor  muscle tenderness bilaterally    Eyes  Vision  Visual Fields  : Extraocular movements are intact. No spontaneous or gaze-induced nystagmus.  Conjunctivae  : clear  Sclerae  : clear  Pupils and Irises  : pupils equal, round, and reactive to light.     Ears, Nose, Mouth and Throat    Ears    External Ears  : appearance within normal limits, no lesions present  Otoscopic Examination  : Tympanic membrane appearance within normal limits bilaterally without perforations, well-aerated middle ears  Hearing  : intact to conversational voice both ears  Tunning fork testing:     :    Nose    External Nose  : appearance normal  Intranasal Exam  : mucosa dry, small amount of blood clot on the left, no evidence of purulence, vestibules normal, no intranasal lesions present, septum midline, sinuses non tender to percussion  Oral Cavity    Oral Mucosa  : oral mucosa normal without pallor or cyanosis  Lips  : lip appearance normal  Teeth  : normal dentition for age  Gums  : gums pink, non-swollen, no bleeding present  Tongue  : tongue appearance normal; normal mobility  Palate  : hard palate normal, soft palate appearance normal with symmetric mobility    Throat    Oropharynx  : no inflammation or lesions present, tonsils within normal limits  Hypopharynx  : appearance within normal limits, superior epiglottis within normal limits  Larynx  : appearance within normal limits, vocal cords within normal limits, no lesions present    Neck  Inspection/Palpation  : normal appearance, no masses or tenderness, trachea midline; thyroid size normal, nontender, no nodules or masses  present on palpation    Respiratory  Respiratory Effort  : breathing unlabored  Inspection of Chest  : normal appearance, no retractions    Cardiovascular  Heart  : regular rate and rhythm    Lymphatic  Neck  : no lymphadenopathy present  Supraclavicular Nodes  : no lymphadenopathy present  Preauricular Nodes  : no lymphadenopathy present    Skin and Subcutaneous Tissue  General Inspection  : Regarding face and neck - there are no rashes present, no lesions present, and no areas of discoloration    Neurologic  Cranial Nerves  : cranial nerves II-XII are grossly intact bilaterally  Gait and Station  : normal gait, able to stand without diffculty    Psychiatric  Judgement and Insight  : judgment and insight intact  Mood and Affect  : mood normal, affect appropriate         Imaging:    CT head:    1. No intracranial hemorrhage     2. No CT changes of major vascular territory brain ischemia or global anoxia at this time     3. 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          No acute brain abnormality is seen. No acute infarct. No acute intracranial hemorrhage.   2.          There may be mild-to-moderate chronic small vessel ischemia/infarction.   3.          Slight motion artifact obscures detail on some of the sequences.   4.          There is an abnormal MRI appearance of the left maxillary antrum and the left nasal cavity, as discussed.  One differential consideration for the findings would be an antrochoanal polyp (ACP) and an associated fungus ball (mycetoma).       Assessment and Plan    Diagnoses and all orders for this visit:    1. Altered mental status, unspecified altered mental status type (Primary)    2. Difficulty walking    Other orders  -     etomidate (AMIDATE) injection 10 mg  -     succinylcholine (ANECTINE) injection 100 mg  -     Cancel: NPO Diet NPO Type: Strict NPO; Standing  -     Undress & Gown; Standing  -     Cancel: Cardiac Monitoring;  Standing  -     Cancel: Continuous Pulse Oximetry; Standing  -     Cancel: Oxygen Therapy- Nasal Cannula; Titrate 1-6 LPM Per SpO2; 90 - 95%; Standing  -     Vital Signs; Standing  -     Cancel: Orthostatic Blood Pressure; Standing  -     Cancel: Fall Precautions; Standing  -     XR Chest 1 View; Standing  -     ECG 12 Lead ED Triage Standing Order; Weak / Dizzy / AMS; Standing  -     Cancel: POC Glucose Once; Standing  -     Insert Peripheral IV; Standing  -     Discontinue: sodium chloride 0.9 % flush 10 mL  -     [place] Draw; Standing  -     CBC & Differential; Standing  -     Comprehensive Metabolic Panel; Standing  -     Single High Sensitivity Troponin T; Standing  -     Magnesium; Standing  -     Urinalysis With Microscopic If Indicated (No Culture) - Urine, Clean Catch; Standing  -     Cancel: NPO Diet NPO Type: Strict NPO  -     Undress & Gown  -     Cancel: Cardiac Monitoring  -     Cancel: Continuous Pulse Oximetry  -     Vital Signs  -     Cancel: Orthostatic Blood Pressure  -     Cancel: Fall Precautions  -     XR Chest 1 View  -     ECG 12 Lead ED Triage Standing Order; Weak / Dizzy / AMS  -     Cancel: POC Glucose Once  -     Insert Peripheral IV  -     [place] Draw  -     CBC & Differential  -     Comprehensive Metabolic Panel  -     Single High Sensitivity Troponin T  -     Magnesium  -     Urinalysis With Microscopic If Indicated (No Culture) - Urine, Clean Catch  -     Lactic Acid, Plasma; Standing  -     Lactic Acid, Plasma  -     CT Head Without Contrast; Standing  -     CT Cervical Spine Without Contrast; Standing  -     CT Head Without Contrast  -     CT Cervical Spine Without Contrast  -     CT Chest Without Contrast Diagnostic; Standing  -     CT Chest Without Contrast Diagnostic  -     Blood Gas, Arterial -With Co-Ox Panel: Yes; Standing  -     Blood Gas, Arterial -With Co-Ox Panel: Yes  -     Intubation; Standing  -     Intubation  -     Respiratory Culture - Sputum, ET Suction;  Standing  -     Respiratory Culture - Sputum, ET Suction  -     Blood Culture - Blood,; Standing  -     Blood Culture - Blood, Arm, Left  -     Cancel: IP General Consult (Use specialty-specific consult if known); Standing  -     Cancel: IP General Consult (Use specialty-specific consult if known)  -     Blood Culture - Blood,; Standing  -     Blood Culture - Blood, Arm, Left  -     sodium chloride 0.9 % bolus 1,000 mL  -     STAT Lactic Acid, Reflex; Standing  -     STAT Lactic Acid, Reflex  -     Cancel: NPO Diet NPO Type: Strict NPO; Standing  -     Cancel: SLP Consult: Eval & Treat RN Dysphagia Screen Failed; Standing  -     Cancel: Hospitalist (on-call MD unless specified); Standing  -     TSH; Standing  -     T4, Free; Standing  -     Cancel: NPO Diet NPO Type: Strict NPO  -     Cancel: SLP Consult: Eval & Treat RN Dysphagia Screen Failed  -     Cancel: Hospitalist (on-call MD unless specified)  -     TSH  -     T4, Free  -     Discontinue: norepinephrine (LEVOPHED) 8 mg in 250 mL NS infusion (premix)  -     CT Chest With Contrast Diagnostic; Standing  -     CT Chest With Contrast Diagnostic  -     vancomycin IVPB 2000 mg in 0.9% Sodium Chloride 500 mL  -     piperacillin-tazobactam (ZOSYN) 3.375 g/100 mL 0.9% NS IVPB (mbp)  -     Elevate HOB; Standing  -     chlorhexidine (PERIDEX) 0.12 % solution 15 mL  -     Cancel: Oral Care & Teeth Brushing - Intubated Patient; Standing  -     Cancel: Subglottic Suctioning Must Be Done Every 6 Hours; Standing  -     Cancel: Spontaneous Awakening Trial; Standing  -     Cancel: Spontaneous Breathing Trial; Standing  -     Cancel: Target Arousal Level RASS -1 to -2; Standing  -     Cancel: NPO Diet NPO Type: Strict NPO; Standing  -     RN May Place Order For ABG As Needed for Respiratory Distress; Standing  -     Inpatient Admission; Standing  -     Cancel: Vital Signs; Standing  -     Weigh Patient; Standing  -     Daily Weights; Standing  -     Oral Care; Standing  -      Inpatient Case Management  Consult; Standing  -     Insert Peripheral IV; Standing  -     Cancel: Saline Lock & Maintain IV Access; Standing  -     sodium chloride 0.9 % flush 10 mL  -     sodium chloride 0.9 % flush 10 mL  -     sodium chloride 0.9 % infusion 40 mL  -     acetaminophen (TYLENOL) tablet 650 mg  -     acetaminophen (TYLENOL) 160 MG/5ML oral solution 650 mg  -     acetaminophen (TYLENOL) suppository 650 mg  -     sennosides-docusate (PERICOLACE) 8.6-50 MG per tablet 2 tablet  -     polyethylene glycol (MIRALAX) packet 17 g  -     Discontinue: bisacodyl (DULCOLAX) EC tablet 5 mg  -     bisacodyl (DULCOLAX) suppository 10 mg  -     ondansetron (ZOFRAN) injection 4 mg  -     Place Sequential Compression Device; Standing  -     Maintain Sequential Compression Device; Standing  -     Cancel: Enoxaparin Sodium (LOVENOX) syringe 40 mg  -     Continuous Cardiac Monitoring; Standing  -     nitroglycerin (NITROSTAT) SL tablet 0.4 mg  -     Telemetry - Maintain IV Access; Standing  -     Telemetry - Place Orders & Notify Provider of Results When Patient Experiences Acute Chest Pain, Dysrhythmia or Respiratory Distress; Standing  -     Pulse Oximetry, Continuous; Standing  -     Turn Patient; Standing  -     Strict Intake & Output; Standing  -     Cancel: Ventilator - Vent Mode: AC/VC; Rate: 20; FiO2: Titrate Per SpO2; Titrate Oxygen for SpO2: 90 - 95%; PEEP: 5; Tidal Volume: mL; TV: 400; Standing  -     Cancel: Weaning Parameters; Standing  -     Discontinue: famotidine (PEPCID) injection 20 mg  -     Inpatient Pulmonology Consult; Standing  -     Inpatient Palliative Care Team Consult; Standing  -     Blood Gas, Arterial -With Co-Ox Panel: Yes; Standing  -     BNP; Standing  -     CK; Standing  -     Magnesium; Standing  -     Phosphorus; Standing  -     Procalcitonin; Standing  -     Cancel: Basic Metabolic Panel; Standing  -     CBC & Differential; Standing  -     Magnesium; Standing  -      Phosphorus; Standing  -     D-dimer, Quantitative; Standing  -     Cancel: Protime-INR; Standing  -     Cancel: aPTT; Standing  -     Adult Transthoracic Echo Complete w/ Color, Spectral and Contrast if necessary per protocol; Standing  -     Discontinue: sodium chloride 0.9 % infusion  -     Potassium Replacement - Follow Nurse / BPA Driven Protocol  -     Discontinue: Magnesium Standard Dose Replacement - Follow Nurse / BPA Driven Protocol  -     Phosphorus Replacement - Follow Nurse / BPA Driven Protocol  -     Discontinue: Calcium Replacement - Follow Nurse / BPA Driven Protocol  -     albuterol (PROVENTIL) nebulizer solution 0.083% 2.5 mg/3mL  -     Tube Maintenance; Standing  -     Continue Indwelling Urinary Catheter; Standing  -     Cancel: Assess Need for Indwelling Urinary Catheter - Follow Removal Protocol; Standing  -     Cancel: Urinary Catheter Care; Standing  -     Discontinue: Pharmacy to dose vancomycin  -     Discontinue: Pharmacy to Dose Zosyn  -     Discontinue: fentaNYL citrate (PF) (SUBLIMAZE) injection 25 mcg  -     MRI Brain Without Contrast; Standing  -     EEG; Standing  -     Cancel: [place] Coma Scale; Standing  -     Cancel: Vital Signs; Standing  -     Cancel: NPO Diet NPO Type: Strict NPO; Standing  -     High Sensitivity Troponin T; Standing  -     Cancel: CK Total & CKMB; Standing  -     Cancel: Blood Gas, Arterial -With Co-Ox Panel: Yes; Standing  -     Protime-INR; Standing  -     Cancel: Blood Gas, Arterial -With Co-Ox Panel: Yes; Standing  -     Cancel: [place] Coma Scale; Standing  -     Cancel: Skin Assessment; Standing  -     Cancel: Strict Intake & Output; Standing  -     Cancel: Place Sequential Compression Device; Standing  -     Cancel: Maintain Sequential Compression Device; Standing  -     Cancel: norepinephrine (LEVOPHED) 8 mg in 250 mL NS infusion (premix)  -     Discontinue: artificial tears ophthalmic ointment 1 application   -     Discontinue: Potassium Replacement  - Follow Nurse / BPA Driven Protocol  -     Apply Warming [place]; Standing  -     Cancel: Cerebral Performance Category Scale Initiation of TTM; Standing  -     Cancel: Continuous Core Temperature Monitoring; Standing  -     Cancel: Initiate Targeted Temperature Management Protocol Goal Temperature (C): 37; Standing  -     Cancel: Initiate Cooling With Target Temperature Device; Standing  -     Cancel: Notify Provider (Induction Phase); Standing  -     Cancel: Cooling Phase Vital Signs; Standing  -     Cancel: Record Core Temperature; Standing  -     Cancel: Document Target Temperature Device Water Temperature; Standing  -     Cancel: Do NOT Bathe Patient During Cooling Phase to Prevent Temperature Fluctuation; Standing  -     Cancel: No Active Warming of Patient During Cooling Phase; Standing  -     Cancel: If Blood Glucose Greater Than 180, Place Glucommander Insulin Infusion Target 140-180 Hyperglycemia Order Set; Standing  -     Cancel: If Patient Begins to Awaken, Start Sedation & Notify Provider, Avoid Paralytics; Standing  -     Cancel: No Sedation Vacation / Awakening Trial During Maintenance Phase; Standing  -     Cancel: ECG 12 Lead Other; Targeted Temperature Management Maintenance; Standing  -     Cancel: Transport of Therapeutic Hypothermia Patient; Standing  -     Cancel: Discontinue Paralytics, Wean Sedation & Analgesia Once Normothermia Achieved; Standing  -     Cancel: Normothermia Phase- If Target Temperature Device still available, may continue use for up to 72 hours with the goal of hyperthermia prevention.; Standing  -     Cancel: Normothermia Phase - Consider Neurology Consult For Prognosis / Seizure Activity; Standing  -     PT Consult: Eval & Treat; Standing  -     OT Consult: Eval & Treat; Standing  -     SLP Consult: Eval & Treat Other; Post Target Temperature Management (Cognitive-Communication Disorder); Standing  -     Cancel: Comprehensive Metabolic Panel; Standing  -     Cancel:  Lactic Acid, Plasma; Standing  -     Cancel: CBC & Differential; Standing  -     Cancel: aPTT; Standing  -     Cancel: Calcium, Ionized; Standing  -     Cancel: Magnesium; Standing  -     Cancel: Phosphorus; Standing  -     Inpatient Admission  -     Discontinue: midodrine (PROAMATINE) tablet 10 mg  -     iopamidol (ISOVUE-370) 76 % injection 100 mL  -     MRSA Screen, PCR (Inpatient) - Swab, Nares; Standing  -     Cancel: Initiate & Follow Heparin Protocol; Standing  -     Cancel: Notify Provider Platelet Count Less Than 20250; Standing  -     Cancel: Notify Provider if PTT Not in Therapeutic Range After 24 Hours; Standing  -     Cancel: Stop Infusion & Notify Provider if Bleeding Occurs; Standing  -     Protime-INR; Standing  -     aPTT; Standing  -     Cancel: CBC & Differential; Standing  -     Cancel: aPTT; Standing  -     Cancel: RN To Release aPTT Order 6 Hours After Heparin Bolus & 6 Hours After Any Heparin Rate Change; Standing  -     Cancel: After 2 Consecutive Therapeutic aPTTs, Obtain aPTT Daily.  If a Rate Adjustment is Necessary, Resume Every 6 Hour aPTT Draws; Standing  -     heparin bolus from bag 7,900 Units  -     Discontinue: heparin 16862 units/250 mL (100 units/mL) in 0.45 % NaCl infusion  -     Discontinue: heparin bolus from bag 5,000 Units  -     Discontinue: heparin bolus from bag 2,500 Units  -     Cancel: Inpatient Nutrition Consult; Standing  -     Cancel: Inpatient Nutrition Consult  -     Elevate HOB  -     Cancel: Oral Care & Teeth Brushing - Intubated Patient  -     Cancel: Oral Care & Teeth Brushing - Intubated Patient  -     Cancel: Oral Care & Teeth Brushing - Intubated Patient  -     Cancel: Subglottic Suctioning Must Be Done Every 6 Hours  -     Cancel: Target Arousal Level RASS -1 to -2  -     RN May Place Order For ABG As Needed for Respiratory Distress  -     Cancel: Vital Signs  -     Cancel: Vital Signs  -     Cancel: Vital Signs  -     Cancel: Vital Signs  -     Cancel:  Vital Signs  -     Cancel: Vital Signs  -     Cancel: Vital Signs  -     Cancel: Vital Signs  -     Cancel: Vital Signs  -     Cancel: Vital Signs  -     Cancel: Vital Signs  -     Weigh Patient  -     Daily Weights  -     Oral Care  -     Inpatient Case Management  Consult  -     Insert Peripheral IV  -     Cancel: Saline Lock & Maintain IV Access  -     Place Sequential Compression Device  -     Maintain Sequential Compression Device  -     Continuous Cardiac Monitoring  -     Telemetry - Maintain IV Access  -     Telemetry - Place Orders & Notify Provider of Results When Patient Experiences Acute Chest Pain, Dysrhythmia or Respiratory Distress  -     Pulse Oximetry, Continuous  -     Turn Patient  -     Strict Intake & Output  -     Strict Intake & Output  -     Strict Intake & Output  -     Strict Intake & Output  -     Strict Intake & Output  -     Strict Intake & Output  -     Strict Intake & Output  -     Strict Intake & Output  -     Strict Intake & Output  -     Strict Intake & Output  -     Strict Intake & Output  -     Cancel: Ventilator - Vent Mode: AC/VC; Rate: 20; FiO2: Titrate Per SpO2; Titrate Oxygen for SpO2: 90 - 95%; PEEP: 5; Tidal Volume: mL; TV: 400  -     Cancel: Weaning Parameters  -     Inpatient Pulmonology Consult  -     Inpatient Palliative Care Team Consult  -     Blood Gas, Arterial -With Co-Ox Panel: Yes  -     BNP  -     CK  -     Magnesium  -     Phosphorus  -     Procalcitonin  -     D-dimer, Quantitative  -     Cancel: aPTT  -     Adult Transthoracic Echo Complete w/ Color, Spectral and Contrast if necessary per protocol  -     Tube Maintenance  -     Continue Indwelling Urinary Catheter  -     Cancel: Assess Need for Indwelling Urinary Catheter - Follow Removal Protocol  -     Cancel: Urinary Catheter Care  -     MRI Brain Without Contrast  -     EEG  -     Cancel: [place] Coma Scale  -     Cancel: Vital Signs  -     Cancel: Vital Signs  -     Cancel: Vital Signs  -      Cancel: Vital Signs  -     Cancel: Vital Signs  -     Cancel: Vital Signs  -     Cancel: Vital Signs  -     Cancel: Vital Signs  -     Cancel: Vital Signs  -     Cancel: Vital Signs  -     Cancel: Vital Signs  -     High Sensitivity Troponin T  -     Protime-INR  -     Cancel: [place] Coma Scale  -     Cancel: [place] Coma Scale  -     Cancel: [place] Coma Scale  -     Cancel: Skin Assessment  -     Cancel: Skin Assessment  -     Cancel: Skin Assessment  -     Cancel: Strict Intake & Output  -     Cancel: Strict Intake & Output  -     Cancel: Strict Intake & Output  -     Cancel: Strict Intake & Output  -     Cancel: Strict Intake & Output  -     Cancel: Strict Intake & Output  -     Cancel: Strict Intake & Output  -     Cancel: Strict Intake & Output  -     Cancel: Strict Intake & Output  -     Cancel: Strict Intake & Output  -     Cancel: Strict Intake & Output  -     Apply Warming Codorus  -     Cancel: Continuous Core Temperature Monitoring  -     Cancel: Initiate Targeted Temperature Management Protocol Goal Temperature (C): 37  -     Cancel: Record Core Temperature  -     Cancel: Record Core Temperature  -     Cancel: Record Core Temperature  -     Cancel: Record Core Temperature  -     Cancel: Record Core Temperature  -     Cancel: Record Core Temperature  -     Cancel: Record Core Temperature  -     Cancel: Record Core Temperature  -     Cancel: Record Core Temperature  -     Cancel: Record Core Temperature  -     Cancel: Record Core Temperature  -     Cancel: Record Core Temperature  -     Cancel: Record Core Temperature  -     Cancel: Record Core Temperature  -     Cancel: Record Core Temperature  -     Cancel: Record Core Temperature  -     Cancel: Record Core Temperature  -     Cancel: Record Core Temperature  -     Cancel: Record Core Temperature  -     Cancel: Record Core Temperature  -     Cancel: Record Core Temperature  -     Cancel: Record Core Temperature  -     Cancel: Record Core  Temperature  -     Cancel: Record Core Temperature  -     Cancel: If Blood Glucose Greater Than 180, Place Glucommander Insulin Infusion Target 140-180 Hyperglycemia Order Set  -     Cancel: No Sedation Vacation / Awakening Trial During Maintenance Phase  -     PT Consult: Eval & Treat  -     OT Consult: Eval & Treat  -     SLP Consult: Eval & Treat Other; Post Target Temperature Management (Cognitive-Communication Disorder)  -     Lactic Acid, Plasma  -     Calcium, Ionized  -     Magnesium  -     MRSA Screen, PCR (Inpatient) - Swab, Nares  -     Cancel: Initiate & Follow Heparin Protocol  -     Cancel: Notify Provider Platelet Count Less Than 63472  -     Cancel: Notify Provider if PTT Not in Therapeutic Range After 24 Hours  -     Cancel: Stop Infusion & Notify Provider if Bleeding Occurs  -     Protime-INR  -     aPTT  -     Discontinue: propofol (DIPRIVAN) infusion 10 mg/mL 100 mL  -     Discontinue: piperacillin-tazobactam (ZOSYN) 3.375 g/100 mL 0.9% NS IVPB (mbp)  -     Vancomycin, Random; Standing  -     S. Pneumo Ag Urine or CSF - Urine, Urine, Clean Catch; Standing  -     Legionella Antigen, Urine - Urine, Urine, Clean Catch; Standing  -     S. Pneumo Ag Urine or CSF - Urine, Urine, Clean Catch  -     Legionella Antigen, Urine - Urine, Urine, Clean Catch  -     High Sensitivity Troponin T 2Hr; Standing  -     High Sensitivity Troponin T 2Hr  -     Cancel: Restraints Non-Violent or Non-Self Destructive; Standing  -     Cancel: Restraints Non-Violent or Non-Self Destructive  -     Bronch Wash/BAL Differential - Bronchial Wash, Bronchus; Standing  -     Pneumonia Panel - Lavage, Lung, Right Lower Lobe; Standing  -     BAL Culture, Quantitative - Lavage, Bronchus; Standing  -     Bronch Wash/BAL Differential - Bronchial Wash, Bronchus  -     Pneumonia Panel - Lavage, Lung, Right Lower Lobe  -     BAL Culture, Quantitative - Lavage, Bronchus  -     aPTT; Standing  -     aPTT  -     Influenza Vac High-Dose Quad  (FLUZONE HIGH DOSE) injection 0.7 mL  -     Lactic Acid, Plasma  -     Calcium, Ionized  -     Magnesium  -     XR Abdomen KUB; Standing  -     XR Abdomen KUB  -     Discontinue: fentaNYL citrate (PF) (SUBLIMAZE) injection 100 mcg  -     Basic Metabolic Panel  -     CBC & Differential  -     Cancel: Magnesium  -     Phosphorus  -     Cancel: CBC & Differential  -     Vancomycin, Random  -     aPTT; Standing  -     aPTT  -     Cancel: Oral Care & Teeth Brushing - Intubated Patient  -     Cancel: Oral Care & Teeth Brushing - Intubated Patient  -     Cancel: Oral Care & Teeth Brushing - Intubated Patient  -     Cancel: Oral Care & Teeth Brushing - Intubated Patient  -     Cancel: Oral Care & Teeth Brushing - Intubated Patient  -     Cancel: Oral Care & Teeth Brushing - Intubated Patient  -     Cancel: Vital Signs  -     Cancel: Vital Signs  -     Cancel: Vital Signs  -     Cancel: Vital Signs  -     Cancel: Vital Signs  -     Cancel: Vital Signs  -     Cancel: Vital Signs  -     Cancel: Vital Signs  -     Cancel: Vital Signs  -     Cancel: Vital Signs  -     Cancel: Vital Signs  -     Cancel: Vital Signs  -     Cancel: Vital Signs  -     Cancel: Vital Signs  -     Cancel: Vital Signs  -     Cancel: Vital Signs  -     Cancel: Vital Signs  -     Cancel: Vital Signs  -     Cancel: Vital Signs  -     Cancel: Vital Signs  -     Cancel: Vital Signs  -     Cancel: Vital Signs  -     Cancel: Vital Signs  -     Cancel: Vital Signs  -     Daily Weights  -     Oral Care  -     Oral Care  -     Strict Intake & Output  -     Strict Intake & Output  -     Strict Intake & Output  -     Strict Intake & Output  -     Strict Intake & Output  -     Strict Intake & Output  -     Strict Intake & Output  -     Strict Intake & Output  -     Strict Intake & Output  -     Strict Intake & Output  -     Strict Intake & Output  -     Strict Intake & Output  -     Strict Intake & Output  -     Strict Intake & Output  -     Strict Intake &  Output  -     Strict Intake & Output  -     Strict Intake & Output  -     Strict Intake & Output  -     Strict Intake & Output  -     Strict Intake & Output  -     Strict Intake & Output  -     Strict Intake & Output  -     Strict Intake & Output  -     Strict Intake & Output  -     Cancel: Urinary Catheter Care  -     Cancel: Urinary Catheter Care  -     Cancel: Vital Signs  -     Cancel: Vital Signs  -     Cancel: Vital Signs  -     Cancel: Vital Signs  -     Cancel: Vital Signs  -     Cancel: Vital Signs  -     Cancel: Vital Signs  -     Cancel: Vital Signs  -     Cancel: Vital Signs  -     Cancel: Vital Signs  -     Cancel: Vital Signs  -     Cancel: Vital Signs  -     Cancel: Vital Signs  -     Cancel: Vital Signs  -     Cancel: Vital Signs  -     Cancel: Vital Signs  -     Cancel: Vital Signs  -     Cancel: Vital Signs  -     Cancel: Vital Signs  -     Cancel: Vital Signs  -     Cancel: Vital Signs  -     Cancel: Vital Signs  -     Cancel: Vital Signs  -     Cancel: Vital Signs  -     Cancel: [place] Coma Scale  -     Cancel: [place] Coma Scale  -     Cancel: [place] Coma Scale  -     Cancel: [place] Coma Scale  -     Cancel: [place] Coma Scale  -     Cancel: [place] Coma Scale  -     Cancel: Skin Assessment  -     Cancel: Skin Assessment  -     Cancel: Skin Assessment  -     Cancel: Skin Assessment  -     Cancel: Skin Assessment  -     Cancel: Skin Assessment  -     Cancel: Strict Intake & Output  -     Cancel: Strict Intake & Output  -     Cancel: Strict Intake & Output  -     Cancel: Strict Intake & Output  -     Cancel: Strict Intake & Output  -     Cancel: Strict Intake & Output  -     Cancel: Strict Intake & Output  -     Cancel: Strict Intake & Output  -     Cancel: Strict Intake & Output  -     Cancel: Strict Intake & Output  -     Cancel: Strict Intake & Output  -     Cancel: Strict Intake & Output  -     Cancel: Strict Intake & Output  -     Cancel: Strict Intake & Output  -     Cancel: Strict Intake  & Output  -     Cancel: Strict Intake & Output  -     Cancel: Strict Intake & Output  -     Cancel: Strict Intake & Output  -     Cancel: Strict Intake & Output  -     Cancel: Strict Intake & Output  -     Cancel: Strict Intake & Output  -     Cancel: Strict Intake & Output  -     Cancel: Strict Intake & Output  -     Cancel: Strict Intake & Output  -     aPTT; Standing  -     aPTT  -     aPTT; Standing  -     aPTT  -     Lactic Acid, Plasma  -     Calcium, Ionized  -     Magnesium  -     Discontinue: vancomycin 1250 mg/250 mL 0.9% NS IVPB ([facility])  -     Cancel: Weaning Parameters  -     Cancel: Restraints Non-Violent or Non-Self Destructive; Standing  -     calcium gluconate 1000 Mg/50ml 0.675% NaCl IV SOLN  -     magnesium sulfate 2g/50 mL (PREMIX) infusion  -     Cancel: Restraints Non-Violent or Non-Self Destructive  -     Cancel: Place Feeding Tube Per Cortrak System; Standing  -     ABG with Co-Ox and Electrolytes; Standing  -     Cancel: Place Feeding Tube Per Cortrak System  -     ABG with Co-Ox and Electrolytes  -     Inpatient Nutrition Consult; Standing  -     Inpatient Nutrition Consult  -     Fungitell B-D Glucan; Standing  -     Fungitell B-D Glucan  -     Aspergillus Galactomannan Antigen - Blood, Blood, Venous Line; Standing  -     Aspergillus Galactomannan Antigen - Blood, Hand, Right  -     XR Abdomen KUB; Standing  -     XR Abdomen KUB  -     aPTT; Standing  -     aPTT  -     midodrine (PROAMATINE) tablet 10 mg  -     Code Status and Medical Interventions:; Standing  -     Code Status and Medical Interventions:  -     RT to Initiate Bronchopulmonary Hygiene Protocol; Standing  -     RT to Initiate Bronchodilator Protocol; Standing  -     Inpatient ENT Consult; Standing  -     Inpatient ENT Consult  -     voriconazole (VFEND) 430 mg in dextrose (D5W) 5 % 100 mL IVPB  -     Discontinue: voriconazole (VFEND) 290 mg in dextrose (D5W) 5 % 100 mL IVPB  -     Hemoglobin A1c; Standing  -     Hemoglobin  A1c  -     Extubation; Standing  -     Extubation  -     Sharan and Document Tube Depth (in cm); Standing  -     Verify Tube Placement Upon Insertion & As Needed; Standing  -     Elevate Head Of Bed 30-45 Degrees; Standing  -     Write Hang Time on Enteral Feeding Bag; Standing  -     Notify Provider - Abdominal Discomfort, Pain or Distention, No Bowel Movement in 3 Days; Standing  -     Residual Volume Assessment - Gastric Feedings; Standing  -     Cancel: NPO Diet NPO Type: Strict NPO; Standing  -     Cancel: Diet, Tube Feeding Tube Feeding Formula: Fibersource HN; Tube Feeding Type: Continuous; Continuous Tube Feeding Start Rate (mL/hr): 25; Then Advance Rate By (mL/hr): 25; Every __ Hours: 4; To Goal Rate of (mL/hr): Other; Other: 65; Total Daily Vol...; Standing  -     Elevate Head Of Bed 30-45 Degrees  -     Write Hang Time on Enteral Feeding Bag  -     Notify Provider - Abdominal Discomfort, Pain or Distention, No Bowel Movement in 3 Days  -     Residual Volume Assessment - Gastric Feedings  -     Cancel: NPO Diet NPO Type: Strict NPO  -     Cancel: Diet, Tube Feeding Tube Feeding Formula: Fibersource HN; Tube Feeding Type: Continuous; Continuous Tube Feeding Start Rate (mL/hr): 25; Then Advance Rate By (mL/hr): 25; Every __ Hours: 4; To Goal Rate of (mL/hr): Other; Other: 65; Total Daily Vol...  -     ABG with Co-Ox and Electrolytes; Standing  -     ABG with Co-Ox and Electrolytes  -     POC Glucose Once; Standing  -     POC Glucose Once  -     Cancel: NIPPV (CPAP or BIPAP); Standing  -     Cancel: NIPPV (CPAP or BIPAP)  -     NIPPV (CPAP or BIPAP); Standing  -     NIPPV (CPAP or BIPAP)  -     POC Glucose Once; Standing  -     POC Glucose Once  -     Lactic Acid, Plasma  -     aPTT; Standing  -     Basic Metabolic Panel  -     CBC & Differential  -     Magnesium  -     Phosphorus  -     aPTT  -     Cancel: Oral Care & Teeth Brushing - Intubated Patient  -     Cancel: Oral Care & Teeth Brushing - Intubated  Patient  -     Cancel: Oral Care & Teeth Brushing - Intubated Patient  -     Cancel: Oral Care & Teeth Brushing - Intubated Patient  -     Cancel: Oral Care & Teeth Brushing - Intubated Patient  -     Cancel: Oral Care & Teeth Brushing - Intubated Patient  -     Cancel: Vital Signs  -     Cancel: Vital Signs  -     Cancel: Vital Signs  -     Cancel: Vital Signs  -     Cancel: Vital Signs  -     Cancel: Vital Signs  -     Cancel: Vital Signs  -     Cancel: Vital Signs  -     Cancel: Vital Signs  -     Cancel: Vital Signs  -     Cancel: Vital Signs  -     Cancel: Vital Signs  -     Cancel: Vital Signs  -     Cancel: Vital Signs  -     Cancel: Vital Signs  -     Cancel: Vital Signs  -     Cancel: Vital Signs  -     Cancel: Vital Signs  -     Cancel: Vital Signs  -     Cancel: Vital Signs  -     Cancel: Vital Signs  -     Cancel: Vital Signs  -     Cancel: Vital Signs  -     Cancel: Vital Signs  -     Daily Weights  -     Oral Care  -     Oral Care  -     Strict Intake & Output  -     Strict Intake & Output  -     Strict Intake & Output  -     Strict Intake & Output  -     Strict Intake & Output  -     Strict Intake & Output  -     Strict Intake & Output  -     Strict Intake & Output  -     Strict Intake & Output  -     Strict Intake & Output  -     Strict Intake & Output  -     Strict Intake & Output  -     Strict Intake & Output  -     Strict Intake & Output  -     Strict Intake & Output  -     Strict Intake & Output  -     Strict Intake & Output  -     Strict Intake & Output  -     Strict Intake & Output  -     Strict Intake & Output  -     Strict Intake & Output  -     Strict Intake & Output  -     Strict Intake & Output  -     Strict Intake & Output  -     Cancel: Urinary Catheter Care  -     Urinary Catheter Care  -     ABG with Co-Ox and Electrolytes; Standing  -     ABG with Co-Ox and Electrolytes  -     Cancel: aPTT; Standing  -     Cancel: Lactic Acid, Plasma  -     Cancel: Restraints Non-Violent or Non-Self  Destructive; Standing  -     potassium phosphate 15 mmol in 0.9% normal saline 250 mL IVPB  -     Cancel: Restraints Non-Violent or Non-Self Destructive  -     Oxygen Therapy- Nasal Cannula; Titrate 1-6 LPM Per SpO2; 90 - 95%; Standing  -     Comprehensive Metabolic Panel; Standing  -     Discontinue Indwelling Urinary Catheter; Standing  -     Bladder Scan if Patient Unable to Void 4-6 Hours After Catheter Removal; Standing  -     If Bladder Scan Volume is Less Than 500mL & Patient is Without Symptoms of Bladder Discomfort / Distention Monitor Every 1-2 Hours for Spontaneous Void; Standing  -     Straight Cath Every 4-6 Hours As Needed If Patient is Unable to Void After 4-6 Hours, Bladder Scan Volume is Greater Than 500mL & Patient Has Symptoms of Bladder Discomfort / Distention; Standing  -     Notify Provider if Bladder Distention Continues; Standing  -     Consult Pharmacist For Review of Medications That May Cause Urinary Retention - RN To Place Order for Consult it Needed; Standing  -     Schedule / Prompt Voiding For Patients With Urinary Incontinence; Standing  -     Discontinue Indwelling Urinary Catheter  -     Notify Provider if Bladder Distention Continues  -     Consult Pharmacist For Review of Medications That May Cause Urinary Retention - RN To Place Order for Consult it Needed  -     Apply External Urinary Catheter; Standing  -     Apply External Urinary Catheter  -     Discontinue: ceFAZolin (ANCEF) IVPB 1,000 mg  -     famotidine (PEPCID) tablet 20 mg  -     Enoxaparin Sodium (LOVENOX) syringe 100 mg  -     Transfer Patient; Standing  -     Vital Signs; Standing  -     Transfer Patient  -     POC Glucose Once; Standing  -     POC Glucose Once  -     ceFAZolin in dextrose (ANCEF) IVPB solution 2,000 mg  -     Pt may have spoon fed nectar thickened liquids.  Nursing Communication; Standing  -     Pt may have spoon fed nectar thickened liquids.  Nursing Communication  -     Vital Signs  -      Discontinue: rOPINIRole (REQUIP) tablet 3 mg  -     Comprehensive Metabolic Panel  -     CBC & Differential  -     Magnesium  -     Phosphorus  -     Daily Weights  -     Oral Care  -     Oral Care  -     Strict Intake & Output  -     Strict Intake & Output  -     Strict Intake & Output  -     Strict Intake & Output  -     Strict Intake & Output  -     Strict Intake & Output  -     Strict Intake & Output  -     Strict Intake & Output  -     Strict Intake & Output  -     Strict Intake & Output  -     Strict Intake & Output  -     Strict Intake & Output  -     Strict Intake & Output  -     Strict Intake & Output  -     Strict Intake & Output  -     Strict Intake & Output  -     Strict Intake & Output  -     Strict Intake & Output  -     Strict Intake & Output  -     Strict Intake & Output  -     Strict Intake & Output  -     Strict Intake & Output  -     Strict Intake & Output  -     Strict Intake & Output  -     rOPINIRole (REQUIP) tablet 3 mg  -     Inpatient Nutrition Consult; Standing  -     Inpatient Nutrition Consult  -     Diet: Regular/House Diet; Texture: Soft to Chew (NDD 3); Soft to Chew: Ground Meat; Fluid Consistency: Nectar Thick; Standing  -     Diet: Regular/House Diet; Texture: Soft to Chew (NDD 3); Soft to Chew: Ground Meat; Fluid Consistency: Nectar Thick  -     Nasograstric Tube Discontinue; Standing  -     Nasograstric Tube Discontinue  -     Cancel: CBC (No Diff); Standing  -     Comprehensive Metabolic Panel  -     Daily Weights  -     Oral Care  -     Oral Care  -     Strict Intake & Output  -     Strict Intake & Output  -     Strict Intake & Output  -     Strict Intake & Output  -     Strict Intake & Output  -     Strict Intake & Output  -     Strict Intake & Output  -     Strict Intake & Output  -     Strict Intake & Output  -     Strict Intake & Output  -     Strict Intake & Output  -     Strict Intake & Output  -     Strict Intake & Output  -     Strict Intake & Output  -     Strict Intake  & Output  -     Strict Intake & Output  -     Strict Intake & Output  -     Strict Intake & Output  -     Strict Intake & Output  -     Strict Intake & Output  -     Strict Intake & Output  -     Strict Intake & Output  -     Strict Intake & Output  -     Strict Intake & Output    1.  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  She has denied any purulent drainage or foul smell or any acute symptoms.  Denies any facial pain or pressure.  I discussed surgical treatment for this, and we will plan to see her in my clinic as an outpatient in 4 to 6 weeks to allow her to recover from her current pneumonia.  She does have some medical comorbidities and her age itself is a risk factor and I will discuss need for treatment at the clinic visit.  Call with any questions.    Follow Up   No follow-ups on file.  Patient was given instructions and counseling regarding her condition or for health maintenance advice. Please see specific information pulled into the AVS if appropriate.

## 2023-10-20 NOTE — PROGRESS NOTES
[facility]   Progress Note    Patient Name: Chula Adkins  : 1940  MRN: [id number]  Primary Care Physician:  Owen Mathias MD  Date of admission: 10/16/2023    Subjective   Subjective     Chief Complaint: f/u confusion shortness of breath     History of Present Illness  Much improved today. Mental status better, no shortness of breath or tachypnea         Objective   Objective     Vitals:   Temp:  [97.3 °F (36.3 °C)-99.7 °F (37.6 °C)] 98.2 °F (36.8 °C)  Heart Rate:  [75-93] 89  Resp:  [20-22] 20  BP: (129-150)/(70-88) 150/88  Flow (L/min):  [3-4] 3    Physical Exam  Vitals reviewed.   Cardiovascular:      Rate and Rhythm: Normal rate.   Pulmonary:      Effort: No respiratory distress.      Breath sounds: No wheezing.   Abdominal:      General: There is no distension.   Musculoskeletal:      Right lower leg: No edema.   Neurological:      Mental Status: She is alert and oriented to person, place, and time.          Result Review    Result Review:  I have personally reviewed the results from the time of this admission to 10/20/2023 16:05 EDT and agree with these findings:  [x]  Laboratory list / accordion  [x]  Microbiology  [x]  Radiology  []  EKG/Telemetry   []  Cardiology/Vascular   []  Pathology  []  Old records  []  Other:        Assessment & Plan   Assessment / Plan     Active Hospital Problems:  Active Hospital Problems    Diagnosis     **Acute respiratory failure with hypoxia      Plan:     Severe sepsis with shock.  Improved   Acute hypoxemic and hypercapnic respiratory failure.No home oxygen use.Due to pneumonia and mucous plugging.  Extubated .  Improving  Right more than left basilar pneumonia likely aspiration.MSSA.S/p bronchoscopy.  Left upper lobe segmental and left lower lobe subsegmental acute PE.  Unprovoked first episode.     hypothermia.  Renal insufficiency.  Baseline not known.  Gas in bilateral subclavian and brachiocephalic vein.  Likely from intravenous  intervention in the ED or by EMS.  Improving.  No intervention as per vascular.  Chronic pain syndrome on Percocet.  Although it is listed as an allergy.  Restless leg syndrome.  History of diabetes mellitus diet controlled.  Hemoglobin A1c of 5.7%  Hypertension diet controlled.  Hypothyroidism.  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  Mild thrombocytopenia likely from sepsis.  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 D/w them.  Felt this was more of a chronic issue since she had denied any purulent drainage and they will see pt in office in 4 to 6 wks from d/c to discuss surgical options at that time         Pulmonary following appreciate input.  S/p bronchoscopy  Cx growing MSSA. Continue IV Ancef.    Bronch culture and PCR positive for Staph aureus.  Continue therapeutic Lovenox for now, consider transition to NOAC soon   Resume appropriate home medication.   vascular followed no intervention needed for gas in her subclavian and brachiocephalic veins.  improved on second CT scan.   2D echo.  Noted with EF of 58%  EEG no epileptiform discharges.  Diffuse encephalopathy.   As needed fentanyl.  ST cleared for diet, d/c NG tube and start feeding per ST recs. RD also following  PT OT ST  Continue Eng catheter      DVT prophylaxis:  Medical and mechanical DVT prophylaxis orders are present.    CODE STATUS:    Code Status (Patient has no pulse and is not breathing): CPR (Attempt to Resuscitate)  Medical Interventions (Patient has pulse or is breathing): Full Support    Disposition:  ultimately will likely need inpt rehab possibly soon if continues to improve,  following    Electronically signed by Prasad Roger DO, 10/20/23, 4:09 PM EDT.

## 2023-10-20 NOTE — THERAPY TREATMENT NOTE
Acute Care - Speech Language Pathology   Swallow Treatment Note ABILIO James     Patient Name: Chula Adkins  : 1940  MRN: [id number]  Today's Date: 10/20/2023               Admit Date: 10/16/2023    Visit Dx:     ICD-10-CM ICD-9-CM   1. Altered mental status, unspecified altered mental status type  R41.82 780.97   2. Difficulty walking  R26.2 719.7     Patient Active Problem List   Diagnosis    Acute respiratory failure with hypoxia     History reviewed. No pertinent past medical history.  History reviewed. No pertinent surgical history.  SPEECH PATHOLOGY DYSPHAGIA TREATMENT    Subjective/Behavioral Observations: Patient seen for dysphagia therapy    Current Diet: Tube feeds    Treatment received: Patient much more awake and alert.  Speech intelligibility has significantly improved.  Patient tolerating soft solids and nectar thick liquids without clinical sign or symptom of aspiration.  Good intake at morning meal.  Therapeutic trials of thin liquids were completed with thin liquids from cup and straw tolerated without clinical sign or symptom of aspiration.    Results of treatment: As stated    Progress toward goals: Progress noted    Barriers to Achieving goals: Medical status    Plan of care:/changes in plan: Initiate mechanical soft diet SOFT TO CHEW-thin liquids  90 degrees upright for all intake  Slow rate, small bites/drinks  Oral meds whole in applesauce  Feed only when awake and alert      EDUCATION  The patient has been educated in the following areas:   Dysphagia (Swallowing Impairment).        Cesilia Courtney, SLP  10/20/2023

## 2023-10-20 NOTE — PROGRESS NOTES
Pulmonary / Critical Care Progress Note      Patient Name: Chula Adkins  : 1940  MRN: [id number]  Attending:  Prasad Roger DO   Date of admission: 10/16/2023    Subjective   Subjective   Follow-up for respiratory failure, pneumonia, pulmonary embolism    No acute events overnight  Feeling better this morning  Weaned down to 3 L nasal cannula  Still reports cough that is mostly nonproductive   at bedside  Has flutter valve at bedside.  Encourage I-S use.    Review of Systems  General: Fatigue, otherwise denied complaints  HEENT: Denied complaints  Respiratory: Cough, otherwise denied complaints  Cardiovascular: Denied complaints  GI: Denied complaints  : Denied complaints  MSK: Weakness, otherwise denied complaints      Objective   Objective     Vitals:   Vital signs for last 24 hours:  Temp:  [97.3 °F (36.3 °C)-99.7 °F (37.6 °C)] 98.2 °F (36.8 °C)  Heart Rate:  [75-93] 89  Resp:  [20-22] 20  BP: (129-150)/(70-88) 150/88    Physical Exam   Vital Signs Reviewed   General:  Alert, NAD.  Chronically ill-appearing female, lying in bed   HEENT:  PERRL, EOMI. NG tube  Neck:  No JVD, no thyromegaly  Lymph: no axillary, cervical, supraclavicular lymphadenopathy noted bilaterally  Chest:  Clear to auscultation bilaterally, no work of breathing noted on 3L NC  CV: RRR, no M/G/R, pulses 2+  Abd:  Soft, NT, ND, +BS  EXT:  no clubbing, no cyanosis, no edema  Neuro:  A&Ox1-2, CN grossly intact, no focal deficits.  Skin: No rashes or lesions noted    Result Review    Result Review:  I have personally reviewed the results from the time of this admission to 10/20/2023 12:35 EDT and agree with these findings:  [x]  Laboratory  [x]  Microbiology  [x]  Radiology  []  EKG/Telemetry   [x]  Cardiology/Vascular   []  Pathology  []  Old records  []  Other:  Most notable findings include:     BAL and sputum culture positive for MSSA  Pneumonia panel positive for MSSA     CT chest - worsening consolidation in  lower lungs, PE noted in subsegmental left lower lobe pulmonary arterial branch and segmental left upper lobe branch    MRI brain with abnormal appearance of left maxillary antrum and left nasal cavity, 1 differential consideration would be antrochoanal polyp or an associated fungus ball        Lab 10/20/23  0527 10/19/23  0618 10/18/23  0329 10/18/23  0242 10/17/23  1410 10/17/23  1012 10/17/23  0045 10/16/23  0807   WBC  --  9.81 9.83  --   --   --  16.23* 5.38   HEMOGLOBIN  --  10.9* 10.9*  --   --   --  12.6 9.8*   HEMATOCRIT  --  33.8* 35.2  --   --   --  41.1 32.0*   PLATELETS  --  173 133*  --   --   --  184 154   SODIUM 139 140 143  --   --   --  142 138   SODIUM, ARTERIAL  --   --   --  141.5 140.8 142.5  --   --    POTASSIUM 4.1 3.9 3.7  --   --   --  4.0 4.8   CHLORIDE 106 108* 114*  --   --   --  112* 107   CO2 26.6 24.0 20.3*  --   --   --  20.3* 18.4*   BUN 14 18 32*  --   --   --  50* 54*   CREATININE 0.78 0.76 1.04*  --   --   --  1.31* 1.20*   GLUCOSE 90 113* 133*  --   --   --  90 121*   GLUCOSE, ARTERIAL  --   --   --  140* 98 103*  --   --    CALCIUM 9.5 9.3 8.9  --   --   --  8.4* 8.0*   PHOSPHORUS  --  1.7* 2.0*  --   --   --  3.4 4.1   TOTAL PROTEIN 5.7* 5.6*  --   --   --   --   --  4.8*   ALBUMIN 2.9* 3.1*  --   --   --   --   --  3.1*   GLOBULIN 2.8 2.5  --   --   --   --   --  1.7     Assessment & Plan   Assessment / Plan     Active Hospital Problems:  Active Hospital Problems    Diagnosis     **Acute respiratory failure with hypoxia      Impression:  Acute hypoxic and hypercapnic respiratory failure  Altered mental status, toxic metabolic encephalopathy  MSSA pneumonia  Concern for aspiration  Pulmonary embolism  History of hypothyroidism  Restless leg syndrome  Chronic pain syndrome    Plan:  -Currently on 3 L nasal cannula.  Continue to wean to maintain SPO2 greater than 90%  -Continue NIPPV as needed  -7/16 CT chest - worsening consolidation in lower lungs, PE noted in subsegmental  left lower lobe pulmonary arterial branch and segmental left upper lobe branch  -Continue therapeutic Lovenox for pulmonary embolism  -Continue Ancef for MSSA pneumonia  -Continue bronchodilator and bronchopulmonary hygiene; will add MetaNeb to regimen  -Continue as needed albuterol  -Echocardiogram with EF of 58%, normal diastolic dysfunction and mildly elevated RVSP at 35-45  -MRI of brain showed an area of concern of the left maxillary antrum differential polyp versus fungus ball.  ENT on board.  Appreciate assistance.  -EEG showed encephalopathy 10/16  -SLP on board.  Appreciate assistance.  Concern for aspiration.  -Dietitian on board.  Cortrak in place.  Tube feeds and free water per them  -Trend renal function and electrolytes.  Replace as needed  -PT/OT on board.  Appreciate assistance.  -Encourage mobilization as able.  Out of bed to chair.    DVT prophylaxis: Heparin drip  Medical and mechanical DVT prophylaxis orders are present.    CODE STATUS:   Code Status (Patient has no pulse and is not breathing): CPR (Attempt to Resuscitate)  Medical Interventions (Patient has pulse or is breathing): Full Support    Electronically signed by Javier Nuñez MD, 10/20/23, 12:37 PM EDT.

## 2023-10-20 NOTE — PLAN OF CARE
Goal Outcome Evaluation:  Plan of Care Reviewed With: patient, family      Patient is alert and oriented x 4. Stand by assist. VSS. O2 @ 2L NC. Patient is tolerating diet well. No complaints of pain or SOA. Will continue plan of care.  Progress: improving

## 2023-10-20 NOTE — THERAPY TREATMENT NOTE
Acute Care - Physical Therapy Treatment Note   [facility]     Patient Name: Chula Adkins  : 1940  MRN: [id number]  Today's Date: 10/20/2023      Visit Dx:     ICD-10-CM ICD-9-CM   1. Altered mental status, unspecified altered mental status type  R41.82 780.97   2. Difficulty walking  R26.2 719.7     Patient Active Problem List   Diagnosis    Acute respiratory failure with hypoxia     History reviewed. No pertinent past medical history.  History reviewed. No pertinent surgical history.  PT Assessment (last 12 hours)       PT Evaluation and Treatment       Row Name 10/20/23 0800          Physical Therapy Time and Intention    Document Type therapy note (daily note)  -WM     Mode of Treatment individual therapy;physical therapy  -WM     Patient Effort good  -WM     Symptoms Noted During/After Treatment fatigue  -       Row Name 10/20/23 0800          Sit-Stand Transfer    Sit-Stand [place] (Transfers) contact guard;verbal cues  -WM     Assistive Device (Sit-Stand Transfers) walker, front-wheeled  -       Row Name 10/20/23 0800          Stand-Sit Transfer    Stand-Sit [place] (Transfers) contact guard;verbal cues  -WM     Assistive Device (Stand-Sit Transfers) walker, front-wheeled  -WM       Row Name 10/20/23 0800          Gait/Stairs (Locomotion)    [place] Level (Gait) contact guard;standby assist;verbal cues  -WM     Assistive Device (Gait) walker, front-wheeled  -WM     Distance in Feet (Gait) 80  -WM     Pattern (Gait) 4-point;step-through  -WM     Deviations/Abnormal Patterns (Gait) gait speed decreased  -       Row Name 10/20/23 0800          Safety Issues, Functional Mobility    Impairments Affecting Function (Mobility) balance;endurance/activity tolerance;strength  -       Row Name 10/20/23 0800          Motor Skills    Therapeutic Exercise hip;knee  -       Row Name 10/20/23 0800          Hip (Therapeutic Exercise)    Hip (Therapeutic Exercise) strengthening exercise   -WM     Hip Strengthening (Therapeutic Exercise) bilateral;aBduction;aDduction;marching while seated;sitting;resistance band;green;20 repititions  -WM       Row Name 10/20/23 0800          Knee (Therapeutic Exercise)    Knee (Therapeutic Exercise) AROM (active range of motion);strengthening exercise  -WM     Knee AROM (Therapeutic Exercise) bilateral;LAQ (long arc quad);sitting;20 repititions  -     Knee Strengthening (Therapeutic Exercise) bilateral;hamstring curls;sitting;resistance band;green;20 repititions  -WM       Row Name 10/20/23 0800          Progress Summary (PT)    Progress Toward Functional Goals (PT) progress toward functional goals is good  -               User Key  (r) = Recorded By, (t) = Taken By, (c) = Cosigned By      Initials Name Provider Type     Tru Lemus PTA Physical Therapist Assistant                    Physical Therapy Education       Title: PT OT SLP Therapies (Done)       Topic: Physical Therapy (Done)       Point: Mobility training (Done)       Learning Progress Summary             Patient Acceptance, E,TB, VU by  at 10/19/2023 1057    Acceptance, E,TB, VU by  at 10/18/2023 1329                         Point: Home exercise program (Done)       Learning Progress Summary             Patient Acceptance, E,TB, VU by  at 10/19/2023 1057                         Point: Body mechanics (Done)       Learning Progress Summary             Patient Acceptance, E,TB, VU by  at 10/19/2023 1057    Acceptance, E,TB, VU by  at 10/18/2023 1329                         Point: Precautions (Done)       Learning Progress Summary             Patient Acceptance, E,TB, VU by  at 10/19/2023 1057    Acceptance, E,TB, VU by  at 10/18/2023 1329                                         User Key       Initials Effective Dates Name Provider Type Discipline     06/11/21 -  Terrell Mcelroy, PT Physical Therapist PT     09/12/22 -  Kim Longoria, RN Registered Nurse Nurse                  PT  Recommendation and Plan     Progress Summary (PT)  Progress Toward Functional Goals (PT): progress toward functional goals is good   Outcome Measures       Row Name 10/20/23 0823 10/19/23 1400 10/18/23 1300       How much help from another person do you currently need...    Turning from your back to your side while in flat bed without using bedrails? 4  -WM 3  -DP (r) AM (t) DP (c) 1  -AV    Moving from lying on back to sitting on the side of a flat bed without bedrails? 4  -WM 3  -DP (r) AM (t) DP (c) 1  -AV    Moving to and from a bed to a chair (including a wheelchair)? 4  -WM 3  -DP (r) AM (t) DP (c) 1  -AV    Standing up from a chair using your arms (e.g., wheelchair, bedside chair)? 4  -WM 3  -DP (r) AM (t) DP (c) 1  -AV    Climbing 3-5 steps with a railing? 2  -WM 2  -DP (r) AM (t) DP (c) 1  -AV    To walk in hospital room? 3  -WM 3  -DP (r) AM (t) DP (c) 1  -AV    AM-PAC 6 Clicks Score (PT) 21  -WM 17  -DP (r) AM (t) 6  -AV    Highest level of mobility 6 --> Walked 10 steps or more  -WM 5 --> Static standing  -DP (r) AM (t) 2 --> Bed activities/dependent transfer  -AV       Functional Assessment    Outcome Measure Options -- AM-PAC 6 Clicks Basic Mobility (PT)  -DP (r) AM (t) DP (c) AM-PAC 6 Clicks Basic Mobility (PT)  -AV              User Key  (r) = Recorded By, (t) = Taken By, (c) = Cosigned By      Initials Name Provider Type     Tru Lemus, PTA Physical Therapist Assistant    Chanel Lopez, PT Physical Therapist    AV Terrell Mcelroy, PT Physical Therapist    AM Maggy Singer, PT Student PT Student                     Time Calculation:    PT Charges       Row Name 10/20/23 0814             Time Calculation    PT Received On 10/20/23  -WM         Timed Charges    77623 - PT Therapeutic Exercise Minutes 12  -WM      52331 - Gait Training Minutes  6  -WM      58496 - PT Therapeutic Activity Minutes 5  -WM         Total Minutes    Timed Charges Total Minutes 23  -WM       Total Minutes 23   -                User Key  (r) = Recorded By, (t) = Taken By, (c) = Cosigned By      Initials Name Provider Type    Tru Dixon PTA Physical Therapist Assistant                      PT G-Codes  Outcome Measure Options: AM-PAC 6 Clicks Basic Mobility (PT)  AM-PAC 6 Clicks Score (PT): 21  AM-PAC 6 Clicks Score (OT): 6    Tru Lemus PTA  10/20/2023

## 2023-10-20 NOTE — PLAN OF CARE
Goal Outcome Evaluation:   Patient was placed on BiPAP and was unable to tolerate. Patient became agitated and had an increased work of breathing. Patient removed mask and stated its smothering her. Patient rested on 4LNC with an oxygen saturation of 95%.

## 2023-10-20 NOTE — NURSING NOTE
Visited Ms Adkins in her room on 4 MTU. She is alert and oriented x 4 her speech is very clear and concise. She has 02 via NC and is weaning per protocol. No c/o SOA. She has an occasional nonproductive cough. She tells me she occasional right shoulder pain d/t an old accident but otherwise most comfortable.  Her  brother is at bedside. They are discussing discharge plans. She has requested to stay in [place] if she needs to have short term rehab. I have notified the  for future discharge plans.   She nor  her bother have any questions or concerns at this time  Palliative will continue to follow and support as needed.    Shikha MINOR RN, BSN  Palliative Care

## 2023-10-21 LAB
ALBUMIN SERPL-MCNC: 2.8 G/DL (ref 3.5–5.2)
ALBUMIN/GLOB SERPL: 1 G/DL
ALP SERPL-CCNC: 103 U/L (ref 39–117)
ALT SERPL W P-5'-P-CCNC: 7 U/L (ref 1–33)
ANION GAP SERPL CALCULATED.3IONS-SCNC: 6.1 MMOL/L (ref 5–15)
AST SERPL-CCNC: 13 U/L (ref 1–32)
BACTERIA SPEC AEROBE CULT: NORMAL
BACTERIA SPEC AEROBE CULT: NORMAL
BILIRUB SERPL-MCNC: 0.5 MG/DL (ref 0–1.2)
BUN SERPL-MCNC: 15 MG/DL (ref 8–23)
BUN/CREAT SERPL: 18.8 (ref 7–25)
CALCIUM SPEC-SCNC: 9.5 MG/DL (ref 8.6–10.5)
CHLORIDE SERPL-SCNC: 103 MMOL/L (ref 98–107)
CO2 SERPL-SCNC: 28.9 MMOL/L (ref 22–29)
CREAT SERPL-MCNC: 0.8 MG/DL (ref 0.57–1)
EGFRCR SERPLBLD CKD-EPI 2021: 74.1 ML/MIN/1.73
GLOBULIN UR ELPH-MCNC: 2.8 GM/DL
GLUCOSE SERPL-MCNC: 89 MG/DL (ref 65–99)
POTASSIUM SERPL-SCNC: 4.2 MMOL/L (ref 3.5–5.2)
PROT SERPL-MCNC: 5.6 G/DL (ref 6–8.5)
SODIUM SERPL-SCNC: 138 MMOL/L (ref 136–145)

## 2023-10-21 PROCEDURE — 94799 UNLISTED PULMONARY SVC/PX: CPT

## 2023-10-21 PROCEDURE — 94660 CPAP INITIATION&MGMT: CPT

## 2023-10-21 PROCEDURE — 25010000002 ENOXAPARIN PER 10 MG: Performed by: INTERNAL MEDICINE

## 2023-10-21 PROCEDURE — 99233 SBSQ HOSP IP/OBS HIGH 50: CPT | Performed by: INTERNAL MEDICINE

## 2023-10-21 PROCEDURE — 99233 SBSQ HOSP IP/OBS HIGH 50: CPT | Performed by: HOSPITALIST

## 2023-10-21 PROCEDURE — 25010000002 CEFAZOLIN IN DEXTROSE 2-4 GM/100ML-% SOLUTION: Performed by: INTERNAL MEDICINE

## 2023-10-21 PROCEDURE — 94761 N-INVAS EAR/PLS OXIMETRY MLT: CPT

## 2023-10-21 PROCEDURE — 94664 DEMO&/EVAL PT USE INHALER: CPT

## 2023-10-21 PROCEDURE — 80053 COMPREHEN METABOLIC PANEL: CPT | Performed by: INTERNAL MEDICINE

## 2023-10-21 RX ORDER — ACETAMINOPHEN 650 MG/1
650 SUPPOSITORY RECTAL EVERY 4 HOURS PRN
Status: DISCONTINUED | OUTPATIENT
Start: 2023-10-21 | End: 2023-10-25 | Stop reason: HOSPADM

## 2023-10-21 RX ORDER — MIDODRINE HYDROCHLORIDE 10 MG/1
10 TABLET ORAL EVERY 8 HOURS
Status: DISCONTINUED | OUTPATIENT
Start: 2023-10-21 | End: 2023-10-25 | Stop reason: HOSPADM

## 2023-10-21 RX ORDER — FAMOTIDINE 20 MG/1
20 TABLET, FILM COATED ORAL NIGHTLY
Status: DISCONTINUED | OUTPATIENT
Start: 2023-10-21 | End: 2023-10-25 | Stop reason: HOSPADM

## 2023-10-21 RX ORDER — ACETAMINOPHEN 325 MG/1
650 TABLET ORAL EVERY 4 HOURS PRN
Status: DISCONTINUED | OUTPATIENT
Start: 2023-10-21 | End: 2023-10-25 | Stop reason: HOSPADM

## 2023-10-21 RX ORDER — GUAIFENESIN 200 MG/10ML
200 LIQUID ORAL EVERY 4 HOURS PRN
Status: DISCONTINUED | OUTPATIENT
Start: 2023-10-21 | End: 2023-10-21

## 2023-10-21 RX ORDER — ROPINIROLE 1 MG/1
3 TABLET, FILM COATED ORAL NIGHTLY
Status: DISCONTINUED | OUTPATIENT
Start: 2023-10-21 | End: 2023-10-25 | Stop reason: HOSPADM

## 2023-10-21 RX ORDER — AMOXICILLIN 250 MG
2 CAPSULE ORAL 2 TIMES DAILY
Status: DISCONTINUED | OUTPATIENT
Start: 2023-10-21 | End: 2023-10-25 | Stop reason: HOSPADM

## 2023-10-21 RX ORDER — BISACODYL 10 MG
10 SUPPOSITORY, RECTAL RECTAL DAILY PRN
Status: DISCONTINUED | OUTPATIENT
Start: 2023-10-21 | End: 2023-10-25 | Stop reason: HOSPADM

## 2023-10-21 RX ORDER — GUAIFENESIN 200 MG/10ML
200 LIQUID ORAL EVERY 4 HOURS PRN
Status: DISCONTINUED | OUTPATIENT
Start: 2023-10-21 | End: 2023-10-25 | Stop reason: HOSPADM

## 2023-10-21 RX ORDER — OXYCODONE HYDROCHLORIDE AND ACETAMINOPHEN 5; 325 MG/1; MG/1
2 TABLET ORAL EVERY 8 HOURS PRN
Status: DISCONTINUED | OUTPATIENT
Start: 2023-10-21 | End: 2023-10-22

## 2023-10-21 RX ORDER — ACETAMINOPHEN 160 MG/5ML
650 SOLUTION ORAL EVERY 4 HOURS PRN
Status: DISCONTINUED | OUTPATIENT
Start: 2023-10-21 | End: 2023-10-25 | Stop reason: HOSPADM

## 2023-10-21 RX ORDER — POLYETHYLENE GLYCOL 3350 17 G/17G
17 POWDER, FOR SOLUTION ORAL DAILY PRN
Status: DISCONTINUED | OUTPATIENT
Start: 2023-10-21 | End: 2023-10-25 | Stop reason: HOSPADM

## 2023-10-21 RX ADMIN — CEFAZOLIN SODIUM 2000 MG: 2 INJECTION, SOLUTION INTRAVENOUS at 21:16

## 2023-10-21 RX ADMIN — DOCUSATE SODIUM 50MG AND SENNOSIDES 8.6MG 2 TABLET: 8.6; 5 TABLET, FILM COATED ORAL at 08:58

## 2023-10-21 RX ADMIN — ENOXAPARIN SODIUM 100 MG: 100 INJECTION SUBCUTANEOUS at 11:58

## 2023-10-21 RX ADMIN — GUAIFENESIN 200 MG: 200 SOLUTION ORAL at 21:15

## 2023-10-21 RX ADMIN — Medication 10 ML: at 08:58

## 2023-10-21 RX ADMIN — MIDODRINE HYDROCHLORIDE 10 MG: 10 TABLET ORAL at 05:20

## 2023-10-21 RX ADMIN — ENOXAPARIN SODIUM 100 MG: 100 INJECTION SUBCUTANEOUS at 23:21

## 2023-10-21 RX ADMIN — CEFAZOLIN SODIUM 2000 MG: 2 INJECTION, SOLUTION INTRAVENOUS at 05:20

## 2023-10-21 RX ADMIN — FAMOTIDINE 20 MG: 20 TABLET, FILM COATED ORAL at 21:16

## 2023-10-21 RX ADMIN — ROPINIROLE HYDROCHLORIDE 3 MG: 1 TABLET, FILM COATED ORAL at 21:15

## 2023-10-21 RX ADMIN — MIDODRINE HYDROCHLORIDE 10 MG: 10 TABLET ORAL at 21:16

## 2023-10-21 RX ADMIN — OXYCODONE AND ACETAMINOPHEN 2 TABLET: 5; 325 TABLET ORAL at 11:58

## 2023-10-21 RX ADMIN — ALBUTEROL SULFATE 2.5 MG: 2.5 SOLUTION RESPIRATORY (INHALATION) at 06:43

## 2023-10-21 RX ADMIN — MIDODRINE HYDROCHLORIDE 10 MG: 10 TABLET ORAL at 11:58

## 2023-10-21 RX ADMIN — CEFAZOLIN SODIUM 2000 MG: 2 INJECTION, SOLUTION INTRAVENOUS at 13:16

## 2023-10-21 RX ADMIN — DOCUSATE SODIUM 50MG AND SENNOSIDES 8.6MG 2 TABLET: 8.6; 5 TABLET, FILM COATED ORAL at 21:15

## 2023-10-21 RX ADMIN — ALBUTEROL SULFATE 2.5 MG: 2.5 SOLUTION RESPIRATORY (INHALATION) at 00:01

## 2023-10-21 RX ADMIN — ALBUTEROL SULFATE 2.5 MG: 2.5 SOLUTION RESPIRATORY (INHALATION) at 19:13

## 2023-10-21 RX ADMIN — ALBUTEROL SULFATE 2.5 MG: 2.5 SOLUTION RESPIRATORY (INHALATION) at 13:28

## 2023-10-21 RX ADMIN — Medication 10 ML: at 21:16

## 2023-10-21 NOTE — PROGRESS NOTES
[facility]   Progress Note    Patient Name: Chula Adkins  : 1940  MRN: [id number]  Primary Care Physician:  Owen Mathias MD  Date of admission: 10/16/2023    Subjective   Subjective     Chief Complaint: f/u confusion shortness of breath     History of Present Illness  Mentation good.  Patient states she goes to pain management.  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   Patient on 3 L of oxygen.       Objective   Objective     Vitals:   Temp:  [97.3 °F (36.3 °C)-99.1 °F (37.3 °C)] 97.3 °F (36.3 °C)  Heart Rate:  [71-95] 71  Resp:  [18-20] 18  BP: (119-146)/(66-88) 146/83  Flow (L/min):  [3] 3    Physical Exam  Vitals reviewed.   Cardiovascular:      Rate and Rhythm: Normal rate.   Pulmonary:      Effort: No respiratory distress.      Breath sounds: No wheezing.   Abdominal:      General: There is no distension.   Musculoskeletal:      Right lower leg: No edema.   Neurological:      Mental Status: She is alert and oriented to person, place, and time.          Result Review    Result Review:  I have personally reviewed the results from the time of this admission to 10/21/2023 10:34 EDT and agree with these findings:  [x]  Laboratory list / accordion  [x]  Microbiology  [x]  Radiology  []  EKG/Telemetry   []  Cardiology/Vascular   []  Pathology  []  Old records  []  Other:        Assessment & Plan   Assessment / Plan     Active Hospital Problems:  Active Hospital Problems    Diagnosis     **Acute respiratory failure with hypoxia      Plan:     Severe sepsis with shock.  Improved   Acute hypoxemic and hypercapnic respiratory failure.No home oxygen use.Due to pneumonia and mucous plugging.  Extubated .  Improving  Right more than left basilar pneumonia likely aspiration.MSSA.S/p bronchoscopy.  Left upper lobe segmental and left lower lobe subsegmental acute PE.  Unprovoked first episode.     hypothermia.  Renal insufficiency.  Baseline not known.  Gas  in bilateral subclavian and brachiocephalic vein.  Likely from intravenous intervention in the ED or by EMS.  Improving.  No intervention as per vascular.  Chronic pain syndrome on Percocet.  Although it is listed as an allergy.  Restless leg syndrome.  History of diabetes mellitus diet controlled.  Hemoglobin A1c of 5.7%  Hypertension diet controlled.  Hypothyroidism.  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  Mild thrombocytopenia likely from sepsis.  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 D/w them.  Felt this was more of a chronic issue since she had denied any purulent drainage and they will see pt in office in 4 to 6 wks from d/c to discuss surgical options at that time         Pulmonary following appreciate input.  S/p bronchoscopy  Cx growing MSSA. Continue IV Ancef.    Bronch culture and PCR positive for Staph aureus.  Continue therapeutic Lovenox for now, consider transition to NOAC soon   Resume appropriate home medication. Restarted home oxycodone.  Patient follows with pain management.    vascular followed no intervention needed for gas in her subclavian and brachiocephalic veins.  improved on second CT scan.   2D echo.  Noted with EF of 58%  EEG no epileptiform discharges.  Diffuse encephalopathy.   As needed fentanyl.  ST cleared for diet, d/c NG tube and start feeding per ST recs. RD also following  PT OT ST  Continue Eng catheter      DVT prophylaxis:  Medical and mechanical DVT prophylaxis orders are present.    CODE STATUS:    DNR    Disposition:  ultimately will likely need inpt rehab possibly soon if continues to improve,  following    Electronically signed by Becca Lamas DO, 10/21/23, 10:37 AM EDT.

## 2023-10-21 NOTE — PLAN OF CARE
Goal Outcome Evaluation:   Patient did wear bipap for close to 30-40 minutes before rippling off stating she felt it was suffocating her. RN is aware.

## 2023-10-21 NOTE — PLAN OF CARE
Goal Outcome Evaluation:   Patient alert and oriented, on 3L NC, up to the chair today, complaints of pain managed with medication, no other changes.

## 2023-10-21 NOTE — PROGRESS NOTES
Pulmonary / Critical Care Progress Note      Patient Name: Chula Adkins  : 1940  MRN: [id number]  Attending:  Becca Lamas DO   Date of admission: 10/16/2023    Subjective   Subjective   Follow-up for respiratory failure, pneumonia, pulmonary embolism    Over past 24 hours, remains on supplemental O2, core track DC'd, tolerating diet, remains on Ancef, continues nebs, remains on therapeutic Lovenox.    No acute events overnight.  Did not tolerate NIPPV.    This morning,  Lying awake in bed on 3 L nasal cannula  More alert and awake  Answering questions appropriately  Overall feeling better  Occasional dry nonproductive cough  No fever or chills  Remains weak and fatigued    Review of Systems  General: Fatigue, otherwise denied complaints  HEENT: Denied complaints  Respiratory: Cough, otherwise denied complaints  Cardiovascular: Denied complaints  GI: Denied complaints  : Denied complaints  MSK: Weakness, otherwise denied complaints      Objective   Objective     Vitals:   Vital signs for last 24 hours:  Temp:  [97.7 °F (36.5 °C)-99.1 °F (37.3 °C)] 97.7 °F (36.5 °C)  Heart Rate:  [80-95] 83  Resp:  [18-20] 18  BP: (119-150)/(66-88) 125/66    Physical Exam   Vital Signs Reviewed   General:  Chronically ill-appearing, elderly female, alert, NAD, lying in bed   HEENT:  PERRL, EOMI. NG tube  Neck:  No JVD, no thyromegaly  Lymph: no axillary, cervical, supraclavicular lymphadenopathy noted bilaterally  Chest:  Clear to auscultation bilaterally, no work of breathing noted on 3L NC  CV: RRR, no M/G/R, pulses 2+  Abd:  Soft, NT, ND, +BS  EXT:  no clubbing, no cyanosis, trace BLE edema  Neuro:  A&Ox2, CN grossly intact, no focal deficits.  Skin: No rashes or lesions noted    Result Review    Result Review:  I have personally reviewed the results from the time of this admission to 10/21/2023 06:49 EDT and agree with these findings:  [x]  Laboratory  [x]  Microbiology  [x]  Radiology  []  EKG/Telemetry   [x]   Cardiology/Vascular   []  Pathology  []  Old records  []  Other:  Most notable findings include:     BAL and sputum culture positive for MSSA  Pneumonia panel positive for MSSA    7/16 CT chest - worsening consolidation in lower lungs, PE noted in subsegmental left lower lobe pulmonary arterial branch and segmental left upper lobe branch    MRI brain with abnormal appearance of left maxillary antrum and left nasal cavity, 1 differential consideration would be antrochoanal polyp or an associated fungus ball        Lab 10/21/23  0512 10/20/23  0527 10/19/23  0618 10/18/23  0329 10/18/23  0242 10/17/23  1410 10/17/23  1012 10/17/23  0045 10/16/23  0807   WBC  --   --  9.81 9.83  --   --   --  16.23* 5.38   HEMOGLOBIN  --   --  10.9* 10.9*  --   --   --  12.6 9.8*   HEMATOCRIT  --   --  33.8* 35.2  --   --   --  41.1 32.0*   PLATELETS  --   --  173 133*  --   --   --  184 154   SODIUM 138 139 140 143  --   --   --  142 138   SODIUM, ARTERIAL  --   --   --   --  141.5 140.8 142.5  --   --    POTASSIUM 4.2 4.1 3.9 3.7  --   --   --  4.0 4.8   CHLORIDE 103 106 108* 114*  --   --   --  112* 107   CO2 28.9 26.6 24.0 20.3*  --   --   --  20.3* 18.4*   BUN 15 14 18 32*  --   --   --  50* 54*   CREATININE 0.80 0.78 0.76 1.04*  --   --   --  1.31* 1.20*   GLUCOSE 89 90 113* 133*  --   --   --  90 121*   GLUCOSE, ARTERIAL  --   --   --   --  140* 98 103*  --   --    CALCIUM 9.5 9.5 9.3 8.9  --   --   --  8.4* 8.0*   PHOSPHORUS  --   --  1.7* 2.0*  --   --   --  3.4 4.1   TOTAL PROTEIN 5.6* 5.7* 5.6*  --   --   --   --   --  4.8*   ALBUMIN 2.8* 2.9* 3.1*  --   --   --   --   --  3.1*   GLOBULIN 2.8 2.8 2.5  --   --   --   --   --  1.7     Assessment & Plan   Assessment / Plan     Active Hospital Problems:  Active Hospital Problems    Diagnosis     **Acute respiratory failure with hypoxia      Impression:  Acute hypoxic and hypercapnic respiratory failure  Altered mental status, toxic metabolic encephalopathy  MSSA  pneumonia  Concern for aspiration  Pulmonary embolism  History of hypothyroidism  Restless leg syndrome  Chronic pain syndrome    Plan:  -Currently on 3 L nasal cannula.  Continue to wean to maintain SPO2 greater than 90%  -Continue NIPPV as needed  -7/16 CT chest - worsening consolidation in lower lungs, PE noted in subsegmental left lower lobe pulmonary arterial branch and segmental left upper lobe branch  -Continue therapeutic Lovenox for pulmonary embolism.  May consider transition to NOAC.  -Continue Ancef for MSSA pneumonia  -Continue bronchodilator and bronchopulmonary hygiene  -Continue MetaNeb for aggressive airway clearance  -Continue albuterol nebs  -Echocardiogram with EF of 58%, normal diastolic dysfunction and mildly elevated RVSP at 35-45  -MRI of brain showed an area of concern of the left maxillary antrum differential polyp versus fungus ball.  ENT on board.  Appreciate assistance.  -EEG showed encephalopathy 10/16  -SLP on board.  Appreciate assistance.  Diet per them.  -Trend renal function and electrolytes.  Replace as needed  -PT/OT on board.  Appreciate assistance.  -Encourage mobilization as able.  Out of bed to chair.    Reviewed labs, imaging and provider notes.  Discussed with bedside nurse and primary service.    DVT prophylaxis: Heparin drip  Medical and mechanical DVT prophylaxis orders are present.    CODE STATUS:   Code Status (Patient has no pulse and is not breathing): CPR (Attempt to Resuscitate)  Medical Interventions (Patient has pulse or is breathing): Full Support    Electronically signed by Chong Francis MD, 10/21/23, 6:49 AM EDT.    Electronically signed by KATHY Lee, 10/21/23, 10:03 AM EDT.    This visit was performed by BOTH a physician and an APC. I personally evaluated and examined the patient. I performed all aspects of MDM as documented. , I have reviewed and confirmed the accuracy of the patient's history as documented in this note., and I have reexamined  the patient and the results are consistent with the previously documented exam. I have updated the documentation as necessary.     Electronically signed by Chong Francis MD, 10/21/23, 12:02 PM EDT.

## 2023-10-21 NOTE — PLAN OF CARE
Goal Outcome Evaluation:            Patient is off bipap and wearing 3L humidified nasal cannula.

## 2023-10-22 LAB
ANION GAP SERPL CALCULATED.3IONS-SCNC: 11.7 MMOL/L (ref 5–15)
BASOPHILS # BLD AUTO: 0.09 10*3/MM3 (ref 0–0.2)
BASOPHILS NFR BLD AUTO: 1.2 % (ref 0–1.5)
BUN SERPL-MCNC: 20 MG/DL (ref 8–23)
BUN/CREAT SERPL: 21.7 (ref 7–25)
CALCIUM SPEC-SCNC: 9.6 MG/DL (ref 8.6–10.5)
CHLORIDE SERPL-SCNC: 99 MMOL/L (ref 98–107)
CO2 SERPL-SCNC: 24.3 MMOL/L (ref 22–29)
CREAT SERPL-MCNC: 0.92 MG/DL (ref 0.57–1)
DEPRECATED RDW RBC AUTO: 43.8 FL (ref 37–54)
EGFRCR SERPLBLD CKD-EPI 2021: 62.7 ML/MIN/1.73
EOSINOPHIL # BLD AUTO: 0.86 10*3/MM3 (ref 0–0.4)
EOSINOPHIL NFR BLD AUTO: 11.7 % (ref 0.3–6.2)
ERYTHROCYTE [DISTWIDTH] IN BLOOD BY AUTOMATED COUNT: 12.7 % (ref 12.3–15.4)
GLUCOSE SERPL-MCNC: 136 MG/DL (ref 65–99)
HCT VFR BLD AUTO: 35.9 % (ref 34–46.6)
HGB BLD-MCNC: 11.4 G/DL (ref 12–15.9)
IMM GRANULOCYTES # BLD AUTO: 0.1 10*3/MM3 (ref 0–0.05)
IMM GRANULOCYTES NFR BLD AUTO: 1.4 % (ref 0–0.5)
LYMPHOCYTES # BLD AUTO: 1.49 10*3/MM3 (ref 0.7–3.1)
LYMPHOCYTES NFR BLD AUTO: 20.2 % (ref 19.6–45.3)
MCH RBC QN AUTO: 29.8 PG (ref 26.6–33)
MCHC RBC AUTO-ENTMCNC: 31.8 G/DL (ref 31.5–35.7)
MCV RBC AUTO: 93.7 FL (ref 79–97)
MONOCYTES # BLD AUTO: 0.9 10*3/MM3 (ref 0.1–0.9)
MONOCYTES NFR BLD AUTO: 12.2 % (ref 5–12)
NEUTROPHILS NFR BLD AUTO: 3.93 10*3/MM3 (ref 1.7–7)
NEUTROPHILS NFR BLD AUTO: 53.3 % (ref 42.7–76)
NRBC BLD AUTO-RTO: 0 /100 WBC (ref 0–0.2)
PLATELET # BLD AUTO: 289 10*3/MM3 (ref 140–450)
PMV BLD AUTO: 10 FL (ref 6–12)
POTASSIUM SERPL-SCNC: 4.5 MMOL/L (ref 3.5–5.2)
RBC # BLD AUTO: 3.83 10*6/MM3 (ref 3.77–5.28)
SODIUM SERPL-SCNC: 135 MMOL/L (ref 136–145)
WBC NRBC COR # BLD: 7.37 10*3/MM3 (ref 3.4–10.8)

## 2023-10-22 PROCEDURE — 99233 SBSQ HOSP IP/OBS HIGH 50: CPT | Performed by: INTERNAL MEDICINE

## 2023-10-22 PROCEDURE — 94799 UNLISTED PULMONARY SVC/PX: CPT

## 2023-10-22 PROCEDURE — 25010000002 CEFAZOLIN IN DEXTROSE 2-4 GM/100ML-% SOLUTION: Performed by: INTERNAL MEDICINE

## 2023-10-22 PROCEDURE — 85025 COMPLETE CBC W/AUTO DIFF WBC: CPT | Performed by: HOSPITALIST

## 2023-10-22 PROCEDURE — 99233 SBSQ HOSP IP/OBS HIGH 50: CPT | Performed by: HOSPITALIST

## 2023-10-22 PROCEDURE — 94761 N-INVAS EAR/PLS OXIMETRY MLT: CPT

## 2023-10-22 PROCEDURE — 25010000002 ENOXAPARIN PER 10 MG: Performed by: INTERNAL MEDICINE

## 2023-10-22 PROCEDURE — 94664 DEMO&/EVAL PT USE INHALER: CPT

## 2023-10-22 PROCEDURE — 80048 BASIC METABOLIC PNL TOTAL CA: CPT | Performed by: HOSPITALIST

## 2023-10-22 RX ORDER — OXYCODONE HYDROCHLORIDE AND ACETAMINOPHEN 5; 325 MG/1; MG/1
1 TABLET ORAL EVERY 8 HOURS PRN
Status: DISCONTINUED | OUTPATIENT
Start: 2023-10-22 | End: 2023-10-25 | Stop reason: HOSPADM

## 2023-10-22 RX ORDER — CEPHALEXIN 500 MG/1
500 CAPSULE ORAL EVERY 6 HOURS SCHEDULED
Status: COMPLETED | OUTPATIENT
Start: 2023-10-22 | End: 2023-10-24

## 2023-10-22 RX ADMIN — ALBUTEROL SULFATE 2.5 MG: 2.5 SOLUTION RESPIRATORY (INHALATION) at 00:13

## 2023-10-22 RX ADMIN — DOCUSATE SODIUM 50MG AND SENNOSIDES 8.6MG 2 TABLET: 8.6; 5 TABLET, FILM COATED ORAL at 08:22

## 2023-10-22 RX ADMIN — Medication 10 ML: at 08:22

## 2023-10-22 RX ADMIN — ENOXAPARIN SODIUM 100 MG: 100 INJECTION SUBCUTANEOUS at 11:56

## 2023-10-22 RX ADMIN — ALBUTEROL SULFATE 2.5 MG: 2.5 SOLUTION RESPIRATORY (INHALATION) at 18:58

## 2023-10-22 RX ADMIN — ALBUTEROL SULFATE 2.5 MG: 2.5 SOLUTION RESPIRATORY (INHALATION) at 12:53

## 2023-10-22 RX ADMIN — ALBUTEROL SULFATE 2.5 MG: 2.5 SOLUTION RESPIRATORY (INHALATION) at 06:54

## 2023-10-22 RX ADMIN — CEFAZOLIN SODIUM 2000 MG: 2 INJECTION, SOLUTION INTRAVENOUS at 13:40

## 2023-10-22 RX ADMIN — CEPHALEXIN 500 MG: 500 CAPSULE ORAL at 17:39

## 2023-10-22 RX ADMIN — MIDODRINE HYDROCHLORIDE 10 MG: 10 TABLET ORAL at 11:56

## 2023-10-22 RX ADMIN — DOCUSATE SODIUM 50MG AND SENNOSIDES 8.6MG 2 TABLET: 8.6; 5 TABLET, FILM COATED ORAL at 22:04

## 2023-10-22 RX ADMIN — ROPINIROLE HYDROCHLORIDE 3 MG: 1 TABLET, FILM COATED ORAL at 22:04

## 2023-10-22 RX ADMIN — CEFAZOLIN SODIUM 2000 MG: 2 INJECTION, SOLUTION INTRAVENOUS at 06:08

## 2023-10-22 RX ADMIN — FAMOTIDINE 20 MG: 20 TABLET, FILM COATED ORAL at 22:04

## 2023-10-22 RX ADMIN — APIXABAN 10 MG: 5 TABLET, FILM COATED ORAL at 22:04

## 2023-10-22 RX ADMIN — OXYCODONE AND ACETAMINOPHEN 2 TABLET: 5; 325 TABLET ORAL at 08:22

## 2023-10-22 RX ADMIN — MIDODRINE HYDROCHLORIDE 10 MG: 10 TABLET ORAL at 06:08

## 2023-10-22 RX ADMIN — MIDODRINE HYDROCHLORIDE 10 MG: 10 TABLET ORAL at 22:04

## 2023-10-22 RX ADMIN — OXYCODONE AND ACETAMINOPHEN 1 TABLET: 5; 325 TABLET ORAL at 17:47

## 2023-10-22 NOTE — PROGRESS NOTES
Pulmonary / Critical Care Progress Note      Patient Name: Chula Adkins  : 1940  MRN: [id number]  Attending:  Becca Lamas DO   Date of admission: 10/16/2023    Subjective   Subjective   Follow-up for respiratory failure, pneumonia, pulmonary embolism    Over past 24 hours, remains on supplemental O2, tolerating diet, remains on Ancef, continues nebs, remains on therapeutic Lovenox.    No acute events overnight.  Refused NIPPV.    This morning,  Sitting up in chair on 3 L nasal cannula  Eating breakfast  Alert and awake  Answers questions appropriately  Feeling significantly better  Occasional cough with scant sputum  No fever or chills  Remains weak and fatigued    Review of Systems  General: Fatigue, otherwise denied complaints  HEENT: Denied complaints  Respiratory: Cough, otherwise denied complaints  Cardiovascular: Denied complaints  GI: Denied complaints  : Denied complaints  MSK: Weakness, otherwise denied complaints      Objective   Objective     Vitals:   Vital signs for last 24 hours:  Temp:  [97.2 °F (36.2 °C)-98.4 °F (36.9 °C)] 98.4 °F (36.9 °C)  Heart Rate:  [67-92] 86  Resp:  [18] 18  BP: (101-146)/(54-85) 101/54    Physical Exam   Vital Signs Reviewed   General:  Chronically ill-appearing, elderly female, alert, NAD, sitting up in chair  HEENT:  PERRL, EOMI. NG tube  Neck:  No JVD, no thyromegaly  Lymph: no axillary, cervical, supraclavicular lymphadenopathy noted bilaterally  Chest: Good aeration, clear to auscultation bilaterally, no work of breathing noted on 3L NC  CV: RRR, no M/G/R, pulses 2+  Abd:  Soft, NT, ND, +BS  EXT:  no clubbing, no cyanosis, trace BLE edema  Neuro:  A&Ox2, CN grossly intact, no focal deficits.  Skin: No rashes or lesions noted    Result Review    Result Review:  I have personally reviewed the results from the time of this admission to 10/22/2023 06:48 EDT and agree with these findings:  [x]  Laboratory  [x]  Microbiology  [x]  Radiology  []   EKG/Telemetry   [x]  Cardiology/Vascular   []  Pathology  []  Old records  []  Other:  Most notable findings include:     BAL and sputum culture positive for MSSA  Pneumonia panel positive for MSSA    7/16 CT chest - worsening consolidation in lower lungs, PE noted in subsegmental left lower lobe pulmonary arterial branch and segmental left upper lobe branch    MRI brain with abnormal appearance of left maxillary antrum and left nasal cavity, 1 differential consideration would be antrochoanal polyp or an associated fungus ball        Lab 10/21/23  0512 10/20/23  0527 10/19/23  0618 10/18/23  0329 10/18/23  0242 10/17/23  1410 10/17/23  1012 10/17/23  0045 10/16/23  0807   WBC  --   --  9.81 9.83  --   --   --  16.23* 5.38   HEMOGLOBIN  --   --  10.9* 10.9*  --   --   --  12.6 9.8*   HEMATOCRIT  --   --  33.8* 35.2  --   --   --  41.1 32.0*   PLATELETS  --   --  173 133*  --   --   --  184 154   SODIUM 138 139 140 143  --   --   --  142 138   SODIUM, ARTERIAL  --   --   --   --  141.5 140.8 142.5  --   --    POTASSIUM 4.2 4.1 3.9 3.7  --   --   --  4.0 4.8   CHLORIDE 103 106 108* 114*  --   --   --  112* 107   CO2 28.9 26.6 24.0 20.3*  --   --   --  20.3* 18.4*   BUN 15 14 18 32*  --   --   --  50* 54*   CREATININE 0.80 0.78 0.76 1.04*  --   --   --  1.31* 1.20*   GLUCOSE 89 90 113* 133*  --   --   --  90 121*   GLUCOSE, ARTERIAL  --   --   --   --  140* 98 103*  --   --    CALCIUM 9.5 9.5 9.3 8.9  --   --   --  8.4* 8.0*   PHOSPHORUS  --   --  1.7* 2.0*  --   --   --  3.4 4.1   TOTAL PROTEIN 5.6* 5.7* 5.6*  --   --   --   --   --  4.8*   ALBUMIN 2.8* 2.9* 3.1*  --   --   --   --   --  3.1*   GLOBULIN 2.8 2.8 2.5  --   --   --   --   --  1.7     Assessment & Plan   Assessment / Plan     Active Hospital Problems:  Active Hospital Problems    Diagnosis     **Acute respiratory failure with hypoxia      Impression:  Acute hypoxic and hypercapnic respiratory failure  Altered mental status, toxic metabolic  encephalopathy  MSSA pneumonia  Concern for aspiration  Pulmonary embolism  History of hypothyroidism  Restless leg syndrome  Chronic pain syndrome    Plan:  -Currently on 3 L nasal cannula.  Continue to wean to maintain SPO2 greater than 90%  -Continue NIPPV as needed  -7/16 CT chest - worsening consolidation in lower lungs, PE noted in subsegmental left lower lobe pulmonary arterial branch and segmental left upper lobe branch  -Continue therapeutic Lovenox for pulmonary embolism.  May consider transition to NOAC.  -Continue Ancef for MSSA pneumonia  -Continue bronchodilator and bronchopulmonary hygiene  -Continue MetaNeb for aggressive airway clearance  -Continue albuterol nebs  -Echocardiogram with EF of 58%, normal diastolic dysfunction and mildly elevated RVSP at 35-45  -MRI of brain showed an area of concern of the left maxillary antrum differential polyp versus fungus ball.  ENT on board.  Appreciate assistance.  -EEG showed encephalopathy 10/16  -SLP on board.  Appreciate assistance.  Diet per them.  -Trend electrolytes and renal panel.  Replace electrolytes as needed.  -PT/OT on board.  Appreciate assistance.  -Encourage mobilization as able.  Out of bed to chair.    Reviewed labs, imaging and provider notes.  Discussed with bedside nurse and primary service.    DVT prophylaxis: Heparin drip  Medical and mechanical DVT prophylaxis orders are present.    CODE STATUS:   Code Status (Patient has no pulse and is not breathing): CPR (Attempt to Resuscitate)  Medical Interventions (Patient has pulse or is breathing): Full Support      This visit was performed by BOTH a physician and an APC. I personally evaluated and examined the patient. I performed all aspects of MDM as documented. , I have reviewed and confirmed the accuracy of the patient's history as documented in this note., and I have reexamined the patient and the results are consistent with the previously documented exam. I have updated the documentation  as necessary.     Electronically signed by Chong Francis MD, 10/22/23, 6:48 AM EDT.  Electronically signed by KATHY Lee, 10/22/23, 10:21 AM EDT.

## 2023-10-22 NOTE — PLAN OF CARE
Goal Outcome Evaluation:   Patient alert and oriented, on 3L NC, complaints of pain managed with medication, up to the chair today, showered today, possible discharge to rehab or home with home health next few days

## 2023-10-22 NOTE — PROGRESS NOTES
[facility]   Progress Note    Patient Name: Chula Adkins  : 1940  MRN: [id number]  Primary Care Physician:  Owen Mathias MD  Date of admission: 10/16/2023    Subjective   Subjective     Chief Complaint: f/u confusion shortness of breath     History of Present Illness  No new issues.  On 3 L of oxygen.  VSS.     Objective   Objective     Vitals:   Temp:  [97.2 °F (36.2 °C)-98.4 °F (36.9 °C)] 98.1 °F (36.7 °C)  Heart Rate:  [67-92] 84  Resp:  [18] 18  BP: (101-126)/(54-85) 122/67  Flow (L/min):  [3] 3    Physical Exam  Vitals reviewed.   Cardiovascular:      Rate and Rhythm: Normal rate.   Pulmonary:      Effort: No respiratory distress.      Breath sounds: No wheezing.   Abdominal:      General: There is no distension.   Musculoskeletal:      Right lower leg: No edema.   Neurological:      Mental Status: She is alert and oriented to person, place, and time.          Result Review    Result Review:  I have personally reviewed the results from the time of this admission to 10/22/2023 09:10 EDT and agree with these findings:  [x]  Laboratory list / accordion  [x]  Microbiology  [x]  Radiology  []  EKG/Telemetry   []  Cardiology/Vascular   []  Pathology  []  Old records  []  Other:        Assessment & Plan   Assessment / Plan     Active Hospital Problems:  Active Hospital Problems    Diagnosis     **Acute respiratory failure with hypoxia      Plan:     Severe sepsis with shock.  Improved   Acute hypoxemic and hypercapnic respiratory failure.No home oxygen use.Due to pneumonia and mucous plugging.  Extubated .  Improving  Right more than left basilar pneumonia likely aspiration.MSSA.S/p bronchoscopy.  Left upper lobe segmental and left lower lobe subsegmental acute PE.  Unprovoked first episode.     hypothermia.  Renal insufficiency.  Baseline not known.  Gas in bilateral subclavian and brachiocephalic vein.  Likely from intravenous intervention in the ED or by EMS.  Improving.  No  intervention as per vascular.  Chronic pain syndrome on Percocet.  Although it is listed as an allergy.  Restless leg syndrome.  History of diabetes mellitus diet controlled.  Hemoglobin A1c of 5.7%  Hypertension diet controlled.  Hypothyroidism.  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  Mild thrombocytopenia likely from sepsis.  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 D/w them.  Felt this was more of a chronic issue since she had denied any purulent drainage and they will see pt in office in 4 to 6 wks from d/c to discuss surgical options at that time         Pulmonary following appreciate input.  S/p bronchoscopy  Cx growing MSSA. Continue IV Ancef.    Bronch culture and PCR positive for Staph aureus.  Started on eliquis on 10/22/2023.   Resume appropriate home medication. Restarted home oxycodone.  Patient follows with pain management.    vascular followed no intervention needed for gas in her subclavian and brachiocephalic veins.  improved on second CT scan.   2D echo.  Noted with EF of 58%  EEG no epileptiform discharges.  Diffuse encephalopathy.   As needed fentanyl.  ST cleared for diet, d/c NG tube and start feeding per ST recs. RD also following  PT OT ST  Continue Eng catheter      DVT prophylaxis:  Medical and mechanical DVT prophylaxis orders are present.    CODE STATUS:    DNR    Disposition:  Patient's family would like the patient to go inpatient () rehab.      Electronically signed by Becca Lamas DO, 10/22/23, 9:11 AM EDT.

## 2023-10-22 NOTE — PLAN OF CARE
Goal Outcome Evaluation:   Patient bipap on standby. Tolerates metaneb well. Wearing 3L humidified nasal cannula.

## 2023-10-23 LAB
ANION GAP SERPL CALCULATED.3IONS-SCNC: 9.4 MMOL/L (ref 5–15)
BASOPHILS # BLD AUTO: 0.12 10*3/MM3 (ref 0–0.2)
BASOPHILS NFR BLD AUTO: 2.1 % (ref 0–1.5)
BUN SERPL-MCNC: 18 MG/DL (ref 8–23)
BUN/CREAT SERPL: 20.9 (ref 7–25)
CALCIUM SPEC-SCNC: 9.7 MG/DL (ref 8.6–10.5)
CHLORIDE SERPL-SCNC: 103 MMOL/L (ref 98–107)
CO2 SERPL-SCNC: 25.6 MMOL/L (ref 22–29)
CREAT SERPL-MCNC: 0.86 MG/DL (ref 0.57–1)
DEPRECATED RDW RBC AUTO: 43.4 FL (ref 37–54)
EGFRCR SERPLBLD CKD-EPI 2021: 68 ML/MIN/1.73
EOSINOPHIL # BLD AUTO: 0.76 10*3/MM3 (ref 0–0.4)
EOSINOPHIL NFR BLD AUTO: 13.3 % (ref 0.3–6.2)
ERYTHROCYTE [DISTWIDTH] IN BLOOD BY AUTOMATED COUNT: 12.4 % (ref 12.3–15.4)
GLUCOSE SERPL-MCNC: 86 MG/DL (ref 65–99)
HCT VFR BLD AUTO: 37.3 % (ref 34–46.6)
HGB BLD-MCNC: 11.7 G/DL (ref 12–15.9)
IMM GRANULOCYTES # BLD AUTO: 0.11 10*3/MM3 (ref 0–0.05)
IMM GRANULOCYTES NFR BLD AUTO: 1.9 % (ref 0–0.5)
LYMPHOCYTES # BLD AUTO: 1.78 10*3/MM3 (ref 0.7–3.1)
LYMPHOCYTES NFR BLD AUTO: 31.2 % (ref 19.6–45.3)
MCH RBC QN AUTO: 29.8 PG (ref 26.6–33)
MCHC RBC AUTO-ENTMCNC: 31.4 G/DL (ref 31.5–35.7)
MCV RBC AUTO: 94.9 FL (ref 79–97)
MONOCYTES # BLD AUTO: 0.66 10*3/MM3 (ref 0.1–0.9)
MONOCYTES NFR BLD AUTO: 11.6 % (ref 5–12)
NEUTROPHILS NFR BLD AUTO: 2.27 10*3/MM3 (ref 1.7–7)
NEUTROPHILS NFR BLD AUTO: 39.9 % (ref 42.7–76)
NRBC BLD AUTO-RTO: 0 /100 WBC (ref 0–0.2)
PLATELET # BLD AUTO: 291 10*3/MM3 (ref 140–450)
PMV BLD AUTO: 9.7 FL (ref 6–12)
POTASSIUM SERPL-SCNC: 4.7 MMOL/L (ref 3.5–5.2)
RBC # BLD AUTO: 3.93 10*6/MM3 (ref 3.77–5.28)
SODIUM SERPL-SCNC: 138 MMOL/L (ref 136–145)
WBC NRBC COR # BLD: 5.7 10*3/MM3 (ref 3.4–10.8)

## 2023-10-23 PROCEDURE — 97116 GAIT TRAINING THERAPY: CPT

## 2023-10-23 PROCEDURE — 94664 DEMO&/EVAL PT USE INHALER: CPT

## 2023-10-23 PROCEDURE — 97110 THERAPEUTIC EXERCISES: CPT

## 2023-10-23 PROCEDURE — 94799 UNLISTED PULMONARY SVC/PX: CPT

## 2023-10-23 PROCEDURE — 97530 THERAPEUTIC ACTIVITIES: CPT

## 2023-10-23 PROCEDURE — 99232 SBSQ HOSP IP/OBS MODERATE 35: CPT | Performed by: INTERNAL MEDICINE

## 2023-10-23 PROCEDURE — 80048 BASIC METABOLIC PNL TOTAL CA: CPT | Performed by: HOSPITALIST

## 2023-10-23 PROCEDURE — 85025 COMPLETE CBC W/AUTO DIFF WBC: CPT | Performed by: HOSPITALIST

## 2023-10-23 PROCEDURE — 94761 N-INVAS EAR/PLS OXIMETRY MLT: CPT

## 2023-10-23 RX ORDER — MORPHINE SULFATE 2 MG/ML
1 INJECTION, SOLUTION INTRAMUSCULAR; INTRAVENOUS ONCE
Status: DISCONTINUED | OUTPATIENT
Start: 2023-10-23 | End: 2023-10-23

## 2023-10-23 RX ORDER — OXYCODONE HYDROCHLORIDE 5 MG/1
2.5 TABLET ORAL ONCE AS NEEDED
Status: COMPLETED | OUTPATIENT
Start: 2023-10-23 | End: 2023-10-23

## 2023-10-23 RX ORDER — CYCLOBENZAPRINE HCL 5 MG
5 TABLET ORAL ONCE AS NEEDED
Status: COMPLETED | OUTPATIENT
Start: 2023-10-23 | End: 2023-10-23

## 2023-10-23 RX ADMIN — DOCUSATE SODIUM 50MG AND SENNOSIDES 8.6MG 2 TABLET: 8.6; 5 TABLET, FILM COATED ORAL at 08:09

## 2023-10-23 RX ADMIN — FAMOTIDINE 20 MG: 20 TABLET, FILM COATED ORAL at 21:08

## 2023-10-23 RX ADMIN — ROPINIROLE HYDROCHLORIDE 3 MG: 1 TABLET, FILM COATED ORAL at 21:08

## 2023-10-23 RX ADMIN — OXYCODONE AND ACETAMINOPHEN 1 TABLET: 5; 325 TABLET ORAL at 21:08

## 2023-10-23 RX ADMIN — ALBUTEROL SULFATE 2.5 MG: 2.5 SOLUTION RESPIRATORY (INHALATION) at 06:35

## 2023-10-23 RX ADMIN — CEPHALEXIN 500 MG: 500 CAPSULE ORAL at 11:47

## 2023-10-23 RX ADMIN — MIDODRINE HYDROCHLORIDE 10 MG: 10 TABLET ORAL at 21:08

## 2023-10-23 RX ADMIN — OXYCODONE HYDROCHLORIDE 2.5 MG: 5 TABLET ORAL at 05:59

## 2023-10-23 RX ADMIN — CEPHALEXIN 500 MG: 500 CAPSULE ORAL at 17:09

## 2023-10-23 RX ADMIN — DOCUSATE SODIUM 50MG AND SENNOSIDES 8.6MG 2 TABLET: 8.6; 5 TABLET, FILM COATED ORAL at 21:09

## 2023-10-23 RX ADMIN — ALBUTEROL SULFATE 2.5 MG: 2.5 SOLUTION RESPIRATORY (INHALATION) at 11:40

## 2023-10-23 RX ADMIN — CEPHALEXIN 500 MG: 500 CAPSULE ORAL at 00:40

## 2023-10-23 RX ADMIN — MIDODRINE HYDROCHLORIDE 10 MG: 10 TABLET ORAL at 11:47

## 2023-10-23 RX ADMIN — CYCLOBENZAPRINE HYDROCHLORIDE 5 MG: 5 TABLET, FILM COATED ORAL at 05:59

## 2023-10-23 RX ADMIN — APIXABAN 10 MG: 5 TABLET, FILM COATED ORAL at 21:08

## 2023-10-23 RX ADMIN — ALBUTEROL SULFATE 2.5 MG: 2.5 SOLUTION RESPIRATORY (INHALATION) at 20:25

## 2023-10-23 RX ADMIN — MIDODRINE HYDROCHLORIDE 10 MG: 10 TABLET ORAL at 04:46

## 2023-10-23 RX ADMIN — OXYCODONE AND ACETAMINOPHEN 1 TABLET: 5; 325 TABLET ORAL at 04:47

## 2023-10-23 RX ADMIN — APIXABAN 10 MG: 5 TABLET, FILM COATED ORAL at 08:09

## 2023-10-23 RX ADMIN — CEPHALEXIN 500 MG: 500 CAPSULE ORAL at 04:47

## 2023-10-23 RX ADMIN — ALBUTEROL SULFATE 2.5 MG: 2.5 SOLUTION RESPIRATORY (INHALATION) at 00:04

## 2023-10-23 NOTE — NURSING NOTE
Visited with Mrs Adkins in her room on 3 NT. She is sitting in the chair at bedside. She is alert and oriented x 4. She has complaints of chronic right shoulder pain. No complaints of SOA. She is on room air. She refuses to wear bipap during the night. She tells me she plans on going home. She is not interested in going to rehab as she and her  are still going to [place] for the winter.   We spoke about code status and a living will. She is not interested in completing. She wishes to remain a full code with all interventions.  Palliative call will follow and support as needed    Shikha MINOR RN, BSN  Palliative Care

## 2023-10-23 NOTE — PLAN OF CARE
Goal Outcome Evaluation:   Patient not on bipap at this time. Tolerating 2L nc well. No changes or issues at this time.

## 2023-10-23 NOTE — PLAN OF CARE
Goal Outcome Evaluation:              Outcome Evaluation: Pt vss. Pt resting well. Pt has had no complaints of pain. Continue plan of care.

## 2023-10-23 NOTE — PLAN OF CARE
Goal Outcome Evaluation:              Outcome Evaluation: Patient rested well during shift. Complaints of pain medicated per MAR. VSS. Will continue plan of care.

## 2023-10-23 NOTE — PROGRESS NOTES
[facility]   Progress Note    Patient Name: Chula Adkins  : 1940  MRN: [id number]  Primary Care Physician:  Owen Mathias MD  Date of admission: 10/16/2023    Subjective   Subjective     Chief Complaint: f/u confusion shortness of breath     Interval f/u: No new complaints; had pain issues last night relieved with 1 time dose of oxy 2.5    Objective   Objective     Vitals:   Temp:  [97.7 °F (36.5 °C)-99 °F (37.2 °C)] 97.8 °F (36.6 °C)  Heart Rate:  [56-86] 56  Resp:  [18] 18  BP: (110-139)/(63-73) 133/64  Flow (L/min):  [2-3] 2    Gen: Pleasant female, sitting in chair  HEENT: NCAT  Eyes: No icterus  CV: S1-S2, no JVD  Chest: Clear bilaterally, no wheezes  Abdomen: soft nondistended  Neuro: No gross focal deficits, speech clear  Psych, mood and affect are appropriate       Result Review    Result Review:  I have reviewed the following:  [x]  Laboratory  CBC          10/19/2023    06:18 10/22/2023    09:57 10/23/2023    05:04   CBC   WBC 9.81  7.37  5.70    RBC 3.66  3.83  3.93    Hemoglobin 10.9  11.4  11.7    Hematocrit 33.8  35.9  37.3    MCV 92.3  93.7  94.9    MCH 29.8  29.8  29.8    MCHC 32.2  31.8  31.4    RDW 13.0  12.7  12.4    Platelets 173  289  291      CMP          10/21/2023    05:12 10/22/2023    09:57 10/23/2023    05:04   CMP   Glucose 89  136  86    BUN 15  20  18    Creatinine 0.80  0.92  0.86    EGFR 74.1  62.7  68.0    Sodium 138  135  138    Potassium 4.2  4.5  4.7    Chloride 103  99  103    Calcium 9.5  9.6  9.7    Total Protein 5.6      Albumin 2.8      Globulin 2.8      Total Bilirubin 0.5      Alkaline Phosphatase 103      AST (SGOT) 13      ALT (SGPT) 7      Albumin/Globulin Ratio 1.0      BUN/Creatinine Ratio 18.8  21.7  20.9    Anion Gap 6.1  11.7  9.4          []  Microbiology  []  Radiology  []  EKG/Telemetry   []  Cardiology/Vascular   []  Pathology  []  Old records  []  Other:        Assessment & Plan   Assessment / Plan     Active Hospital  Problems:  Active Hospital Problems    Diagnosis     **Acute respiratory failure with hypoxia      Assessment:  Severe sepsis with shock, resolved  Acute hypoxemic and hypercapnic respiratory failure, extubated 10/17  Right more than left basilar pneumonia likely aspiration. MSSA.S/p bronchoscopy.  Left upper lobe segmental and left lower lobe subsegmental acute PE.  Unprovoked first episode.    DIANA, resolved  Gas in bilateral subclavian and brachiocephalic vein.  Likely from intravenous intervention in the ED or by EMS.  Improving.  No intervention as per vascular.  Chronic pain syndrome on Percocet.    Restless leg syndrome.  History of diabetes mellitus diet controlled.  Hemoglobin A1c of 5.7%  Hypertension diet controlled.  Hypothyroidism.  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  Mild thrombocytopenia likely from sepsis.  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 Felt this was more of a chronic issue since she had denied any purulent drainage and they will see pt in office in 4 to 6 wks from d/c to discuss surgical options at that time         Pulmonary following appreciate input.  S/p bronchoscopy  Cx growing MSSA. Complete abx course with Keflex  Bronch culture and PCR positive for Staph aureus.  Started on eliquis on 10/22/2023, no sign of bleeding  Resume appropriate home medication. Restarted home oxycodone.  Patient follows with pain management.   Vascular followed no intervention needed for gas in her subclavian and brachiocephalic veins.  improved on second CT scan.  2D echo.  Noted with EF of 58%  EEG no epileptiform discharges.  Diffuse encephalopathy.  Continue PT/OT  Seems to be improving quite nicely, begin discharge planning process      DVT prophylaxis:  Medical and mechanical DVT prophylaxis orders are present.    CODE STATUS:     DNR    Disposition:  Patient's family would like the patient to go inpatient () rehab but pt is thinking she might be well enough to go home    Electronically signed by Benjy Davies MD, 10/23/23, 10:23 AM EDT.

## 2023-10-23 NOTE — THERAPY EVALUATION
Patient Name: Chula Adkins  : 1940    MRN: [id number]                              Today's Date: 10/23/2023       Admit Date: 10/16/2023    Visit Dx:     ICD-10-CM ICD-9-CM   1. Altered mental status, unspecified altered mental status type  R41.82 780.97   2. Difficulty walking  R26.2 719.7     Patient Active Problem List   Diagnosis    Acute respiratory failure with hypoxia     History reviewed. No pertinent past medical history.  History reviewed. No pertinent surgical history.   General Information       Row Name 10/23/23 [facility]          OT Time and Intention    Document Type therapy note (daily note)  -PG     Mode of Treatment individual therapy;occupational therapy  -PG               User Key  (r) = Recorded By, (t) = Taken By, (c) = Cosigned By      Initials Name Provider Type    PG Uziel Lara, ANNALISE Occupational Therapist                     Mobility/ADL's    No documentation.                  Obj/Interventions       Row Name 10/23/23 [facility]          Shoulder (Therapeutic Exercise)    Shoulder (Therapeutic Exercise) strengthening exercise  -PG     Shoulder Strengthening (Therapeutic Exercise) 1 lb free weight;15 repititions  -PG       Row Name 10/23/23 [facility]          Elbow/Forearm (Therapeutic Exercise)    Elbow/Forearm (Therapeutic Exercise) strengthening exercise  -PG     Elbow/Forearm Strengthening (Therapeutic Exercise) 1 lb free weight;15 repititions  -PG       Row Name 10/23/23 [facility]          Motor Skills    Therapeutic Exercise shoulder;elbow/forearm  -PG               User Key  (r) = Recorded By, (t) = Taken By, (c) = Cosigned By      Initials Name Provider Type    PG Uziel Lara OT Occupational Therapist                   Goals/Plan    No documentation.                  Clinical Impression       Row Name 10/23/23 [facility]          Plan of Care Review    Progress no change  -PG               User Key  (r) = Recorded By, (t) = Taken By, (c) = Cosigned By      Initials Name Provider Type    PG  Uziel Lara, OT Occupational Therapist                   Outcome Measures       Row Name 10/23/23 1124          How much help from another is currently needed...    Putting on and taking off regular lower body clothing? 3  -PG     Bathing (including washing, rinsing, and drying) 3  -PG     Toileting (which includes using toilet bed pan or urinal) 3  -PG     Putting on and taking off regular upper body clothing 3  -PG     Taking care of personal grooming (such as brushing teeth) 3  -PG     Eating meals 4  -PG     AM-PAC 6 Clicks Score (OT) 19  -PG       Row Name 10/23/23 1053 10/23/23 0809       How much help from another person do you currently need...    Turning from your back to your side while in flat bed without using bedrails? 4  -DK 4  -MG    Moving from lying on back to sitting on the side of a flat bed without bedrails? 4  -DK 4  -MG    Moving to and from a bed to a chair (including a wheelchair)? 3  -DK 4  -MG    Standing up from a chair using your arms (e.g., wheelchair, bedside chair)? 4  -DK 4  -MG    Climbing 3-5 steps with a railing? 3  -DK 2  -MG    To walk in hospital room? 3  -DK 3  -MG    AM-PAC 6 Clicks Score (PT) 21  -DK 21  -MG    Highest level of mobility 6 --> Walked 10 steps or more  -DK 6 --> Walked 10 steps or more  -MG      Row Name 10/23/23 1124 10/23/23 1053       Functional Assessment    Outcome Measure Options AM-PAC 6 Clicks Daily Activity (OT);Optimal Instrument  -PG AM-PAC 6 Clicks Basic Mobility (PT)  -DK      Row Name 10/23/23 1124          Optimal Instrument    Optimal Instrument Optimal - 3  -PG     Bending/Stooping 2  -PG     Standing 2  -PG     Reaching 1  -PG               User Key  (r) = Recorded By, (t) = Taken By, (c) = Cosigned By      Initials Name Provider Type    Odalys Aguirre, PTA Physical Therapist Assistant    Uziel Morel, OT Occupational Therapist    Lisseth Martin, RN Registered Nurse                    Occupational Therapy Education       Title: PT OT  SLP Therapies (Done)       Topic: Occupational Therapy (Done)       Point: ADL training (Done)       Description:   Instruct learner(s) on proper safety adaptation and remediation techniques during self care or transfers.   Instruct in proper use of assistive devices.                  Learning Progress Summary             Patient Acceptance, E,TB, VU by  at 10/19/2023 1057    Acceptance, E,D, NR by  at 10/18/2023 1233                         Point: Home exercise program (Done)       Description:   Instruct learner(s) on appropriate technique for monitoring, assisting and/or progressing therapeutic exercises/activities.                  Learning Progress Summary             Patient Acceptance, E,TB, VU by  at 10/19/2023 1057    Acceptance, E,D, NR by PG at 10/18/2023 1233                         Point: Precautions (Done)       Description:   Instruct learner(s) on prescribed precautions during self-care and functional transfers.                  Learning Progress Summary             Patient Acceptance, E,TB, VU by  at 10/19/2023 1057    Acceptance, E,D, NR by  at 10/18/2023 1233                         Point: Body mechanics (Done)       Description:   Instruct learner(s) on proper positioning and spine alignment during self-care, functional mobility activities and/or exercises.                  Learning Progress Summary             Patient Acceptance, E,TB, VU by  at 10/19/2023 1057    Acceptance, E,D, NR by  at 10/18/2023 1233                                         User Key       Initials Effective Dates Name Provider Type Discipline     06/16/21 -  Uziel Lara OT Occupational Therapist OT     09/12/22 -  Kim Longoria, RN Registered Nurse Nurse                  OT Recommendation and Plan  Planned Therapy Interventions (OT): activity tolerance training, BADL retraining, strengthening exercise, transfer/mobility retraining, patient/caregiver education/training, occupation/activity based  interventions  Therapy Frequency (OT): 5 times/wk  Plan of Care Review  Plan of Care Reviewed With: patient  Progress: no change  Outcome Evaluation: Patient presents with limitations affecting strength, activity tolerance, and balance impacting patient's ability to return home safely and independently.  The skills of a therapist will be required to safely and effectively implement the following treatment plan to restore maximal level of function     Time Calculation:   Evaluation Complexity (OT)  Review Occupational Profile/Medical/Therapy History Complexity: brief/low complexity  Assessment, Occupational Performance/Identification of Deficit Complexity: 3-5 performance deficits  Clinical Decision Making Complexity (OT): problem focused assessment/low complexity  Overall Complexity of Evaluation (OT): low complexity     Time Calculation- OT       Row Name 10/23/23 1124 10/23/23 1100          Time Calculation- OT    OT Received On 10/23/23  -PG --     OT Goal Re-Cert Due Date 10/27/23  -PG --        Timed Charges    62847 - OT Therapeutic Exercise Minutes 10  -PG --     36082 - Gait Training Minutes  -- 10  -DK        Total Minutes    Timed Charges Total Minutes 10  -PG 10  -DK      Total Minutes 10  -PG 10  -DK               User Key  (r) = Recorded By, (t) = Taken By, (c) = Cosigned By      Initials Name Provider Type    Odalys Aguirre, PTA Physical Therapist Assistant    PG Uziel Lara OT Occupational Therapist                  Therapy Charges for Today       Code Description Service Date Service Provider Modifiers Qty    [id number] HC OT THER PROC EA 15 MIN 10/23/2023 Uziel Lara OT GO 1                 Uziel Lara OT  10/23/2023

## 2023-10-23 NOTE — THERAPY TREATMENT NOTE
Acute Care - Physical Therapy Treatment Note   James     Patient Name: Chula Adkins  : 1940  MRN: [id number]  Today's Date: 10/23/2023      Visit Dx:     ICD-10-CM ICD-9-CM   1. Altered mental status, unspecified altered mental status type  R41.82 780.97   2. Difficulty walking  R26.2 719.7     Patient Active Problem List   Diagnosis    Acute respiratory failure with hypoxia     History reviewed. No pertinent past medical history.  History reviewed. No pertinent surgical history.  PT Assessment (last 12 hours)       PT Evaluation and Treatment       Row Name 10/23/23 1054          Physical Therapy Time and Intention    Subjective Information complains of;weakness;fatigue;pain  -DK     Document Type therapy note (daily note)  -DK     Mode of Treatment individual therapy;physical therapy  -DK     Patient Effort good  -DK     Symptoms Noted During/After Treatment fatigue;increased pain  -DK     Comment Pt reports wanting to go home instead of inpatient rehab.  -       Row Name 10/23/23 1054          Pain    Pretreatment Pain Rating 5/10  -DK     Posttreatment Pain Rating 5/10  -DK     Pain Location generalized  -DK     Pain Location - back  -DK     Pain Intervention(s) Repositioned;Ambulation/increased activity;Distraction;Therapeutic presence  -DK     Additional Documentation --  Pt reports chronic back pain.  -       Row Name 10/23/23 1054          Cognition    Affect/Mental Status (Cognition) WNL  -DK     Orientation Status (Cognition) oriented x 4  -DK     Follows Commands (Cognition) WNL  -DK     Cognitive Function WNL  -DK     Personal Safety Interventions gait belt;nonskid shoes/slippers when out of bed;supervised activity  -DK       Row Name 10/23/23 1054          Transfers    Transfers sit-stand transfer;stand-sit transfer  -       Row Name 10/23/23 1054          Sit-Stand Transfer    Sit-Stand [place] (Transfers) standby assist  -DK     Assistive Device (Sit-Stand Transfers)  walker, front-wheeled  -DK       Row Name 10/23/23 1054          Stand-Sit Transfer    Stand-Sit [place] (Transfers) standby assist  -DK     Assistive Device (Stand-Sit Transfers) walker, front-wheeled  -DK       Row Name 10/23/23 1054          Gait/Stairs (Locomotion)    Gait/Stairs Locomotion gait/ambulation independence;gait/ambulation assistive device;distance ambulated;gait pattern  -DK     [place] Level (Gait) standby assist;contact guard;1 person assist  -DK     Assistive Device (Gait) walker, front-wheeled  -DK     Distance in Feet (Gait) 120  -DK     Pattern (Gait) step-through  -DK     Deviations/Abnormal Patterns (Gait) festinating/shuffling  -DK     Bilateral Gait Deviations forward flexed posture  -DK     Comment, (Gait/Stairs) Pt ambulated on room air with a rolling walker.  She returned to the recliner on alert post treatment. Several stops to talk with people in the hallway.  -DK       Row Name 10/23/23 1054          Safety Issues, Functional Mobility    Safety Issues Affecting Function (Mobility) safety precaution awareness  -DK     Impairments Affecting Function (Mobility) balance;endurance/activity tolerance;strength;pain  -DK       Row Name 10/23/23 1054          Balance    Balance Assessment sitting static balance;sitting dynamic balance;standing static balance;standing dynamic balance  -DK     Static Sitting Balance supervision  -DK     Dynamic Sitting Balance supervision  -DK     Position, Sitting Balance unsupported;sitting in chair  -DK     Static Standing Balance standby assist  -DK     Dynamic Standing Balance standby assist;contact guard;1-person assist  -DK     Position/Device Used, Standing Balance walker, front-wheeled  -DK     Balance Interventions standing;dynamic;tandem gait  -DK       Row Name 10/23/23 1054          Motor Skills    Motor Skills --  therapeutic exercises  -DK     Coordination WFL  -DK     Therapeutic Exercise hip;knee;ankle  -DK       Row Name 10/23/23  1054          Hip (Therapeutic Exercise)    Hip (Therapeutic Exercise) AROM (active range of motion)  -DK     Hip AROM (Therapeutic Exercise) bilateral;flexion;extension;aBduction;aDduction;sitting;20 repititions  -       Row Name 10/23/23 1054          Knee (Therapeutic Exercise)    Knee (Therapeutic Exercise) AROM (active range of motion)  -DK     Knee AROM (Therapeutic Exercise) bilateral;flexion;extension;LAQ (long arc quad);sitting;20 repititions  -       Row Name 10/23/23 1054          Ankle (Therapeutic Exercise)    Ankle (Therapeutic Exercise) AROM (active range of motion)  -DK     Ankle AROM (Therapeutic Exercise) bilateral;dorsiflexion;plantarflexion;sitting;20 repititions  -       Row Name 10/23/23 1054          Plan of Care Review    Plan of Care Reviewed With patient  -DK     Progress improving  -       Row Name 10/23/23 1054          Positioning and Restraints    Pre-Treatment Position sitting in chair/recliner  -DK     Post Treatment Position chair  -DK     In Chair sitting;call light within reach;encouraged to call for assist;exit alarm on  -       Row Name 10/23/23 1054          Therapy Assessment/Plan (PT)    Rehab Potential (PT) good, to achieve stated therapy goals  -     Criteria for Skilled Interventions Met (PT) skilled treatment is necessary  -DK     Therapy Frequency (PT) daily  -DK     Problem List (PT) problems related to;strength;pain;mobility  -       Row Name 10/23/23 1054          Progress Summary (PT)    Progress Toward Functional Goals (PT) progress toward functional goals is good  -               User Key  (r) = Recorded By, (t) = Taken By, (c) = Cosigned By      Initials Name Provider Type    Odalys Aguirre PTA Physical Therapist Assistant                    Physical Therapy Education       Title: PT OT SLP Therapies (Done)       Topic: Physical Therapy (Done)       Point: Mobility training (Done)       Learning Progress Summary             Patient Acceptance,  E,TB, VU by  at 10/19/2023 1057    Acceptance, E,TB, VU by  at 10/18/2023 1329                         Point: Home exercise program (Done)       Learning Progress Summary             Patient Acceptance, E,TB, VU by  at 10/19/2023 1057                         Point: Body mechanics (Done)       Learning Progress Summary             Patient Acceptance, E,TB, VU by  at 10/19/2023 1057    Acceptance, E,TB, VU by  at 10/18/2023 1329                         Point: Precautions (Done)       Learning Progress Summary             Patient Acceptance, E,TB, VU by  at 10/19/2023 1057    Acceptance, E,TB, VU by  at 10/18/2023 1329                                         User Key       Initials Effective Dates Name Provider Type Discipline     06/11/21 -  Terrell Mcelroy, PT Physical Therapist PT     09/12/22 -  Kim Longoria, RN Registered Nurse Nurse                  PT Recommendation and Plan  Planned Therapy Interventions (PT): balance training, bed mobility training, gait training, strengthening, transfer training  Therapy Frequency (PT): daily  Progress Summary (PT)  Progress Toward Functional Goals (PT): progress toward functional goals is good  Plan of Care Reviewed With: patient  Progress: improving   Outcome Measures       Row Name 10/23/23 1053             How much help from another person do you currently need...    Turning from your back to your side while in flat bed without using bedrails? 4  -DK      Moving from lying on back to sitting on the side of a flat bed without bedrails? 4  -DK      Moving to and from a bed to a chair (including a wheelchair)? 3  -DK      Standing up from a chair using your arms (e.g., wheelchair, bedside chair)? 4  -DK      Climbing 3-5 steps with a railing? 3  -DK      To walk in hospital room? 3  -DK      AM-PAC 6 Clicks Score (PT) 21  -DK      Highest level of mobility 6 --> Walked 10 steps or more  -DK         Functional Assessment    Outcome Measure Options AM-PAC 6  Clicks Basic Mobility (PT)  -DK                User Key  (r) = Recorded By, (t) = Taken By, (c) = Cosigned By      Initials Name Provider Type    Odalys Aguirre PTA Physical Therapist Assistant                     Time Calculation:    PT Charges       Row Name 10/23/23 1100             Time Calculation    PT Received On 10/23/23  -DK      PT Goal Re-Cert Due Date 10/27/23  -DK         Timed Charges    88620 - PT Therapeutic Exercise Minutes 12  -DK      92491 - Gait Training Minutes  10  -DK      94932 - PT Therapeutic Activity Minutes 16  -DK         Total Minutes    Timed Charges Total Minutes 38  -DK       Total Minutes 38  -DK                User Key  (r) = Recorded By, (t) = Taken By, (c) = Cosigned By      Initials Name Provider Type    Odalys Aguirre PTA Physical Therapist Assistant                  Therapy Charges for Today       Code Description Service Date Service Provider Modifiers Qty    60700174187 HC PT THER PROC EA 15 MIN 10/23/2023 Odalys Mcdaniel, LIBAN GP 1    36341574480 HC GAIT TRAINING EA 15 MIN 10/23/2023 Odalys Mcdaniel PTA GP 1    16131890103 HC PT THERAPEUTIC ACT EA 15 MIN 10/23/2023 Odalys Mcdaniel, LIBAN GP 1            PT G-Codes  Outcome Measure Options: AM-PAC 6 Clicks Basic Mobility (PT)  AM-PAC 6 Clicks Score (PT): 21  AM-PAC 6 Clicks Score (OT): 6    Odalys Mcdaniel PTA  10/23/2023

## 2023-10-23 NOTE — SIGNIFICANT NOTE
10/23/23 0930   Coping/Psychosocial   Verbalized Emotional State relief;hopefulness   Trust Relationship/Rapport empathic listening provided   Involvement in Care interacting with patient   Additional Documentation Spiritual Care (Group)   Spiritual Care   Use of Spiritual Resources prayer;spirituality for coping, indicated strong use of   Spiritual Care Source  initiative   Spiritual Care Follow-Up follow-up, none required as presently assessed   Response to Spiritual Care receptive of support   Spiritual Care Interventions supportive conversation provided;prayer support provided   Spiritual Care Visit Type initial   Receptivity to Spiritual Care visit welcomed

## 2023-10-23 NOTE — PLAN OF CARE
Goal Outcome Evaluation:  Plan of Care Reviewed With: patient        Progress: no change  Outcome Evaluation: Pt has been refusing bipap for several nights. V60 is on standby at bedside. Pt is currently on room air and is resting comfortably at this time.

## 2023-10-24 LAB
ANION GAP SERPL CALCULATED.3IONS-SCNC: 11.1 MMOL/L (ref 5–15)
BASOPHILS # BLD AUTO: 0.08 10*3/MM3 (ref 0–0.2)
BASOPHILS NFR BLD AUTO: 1.5 % (ref 0–1.5)
BUN SERPL-MCNC: 18 MG/DL (ref 8–23)
BUN/CREAT SERPL: 20.2 (ref 7–25)
CALCIUM SPEC-SCNC: 10.1 MG/DL (ref 8.6–10.5)
CHLORIDE SERPL-SCNC: 99 MMOL/L (ref 98–107)
CO2 SERPL-SCNC: 26.9 MMOL/L (ref 22–29)
CREAT SERPL-MCNC: 0.89 MG/DL (ref 0.57–1)
DEPRECATED RDW RBC AUTO: 42.7 FL (ref 37–54)
EGFRCR SERPLBLD CKD-EPI 2021: 65.2 ML/MIN/1.73
EOSINOPHIL # BLD AUTO: 0.74 10*3/MM3 (ref 0–0.4)
EOSINOPHIL NFR BLD AUTO: 13.9 % (ref 0.3–6.2)
ERYTHROCYTE [DISTWIDTH] IN BLOOD BY AUTOMATED COUNT: 12.4 % (ref 12.3–15.4)
GLUCOSE SERPL-MCNC: 126 MG/DL (ref 65–99)
HCT VFR BLD AUTO: 36.7 % (ref 34–46.6)
HGB BLD-MCNC: 11.6 G/DL (ref 12–15.9)
IMM GRANULOCYTES # BLD AUTO: 0.08 10*3/MM3 (ref 0–0.05)
IMM GRANULOCYTES NFR BLD AUTO: 1.5 % (ref 0–0.5)
LYMPHOCYTES # BLD AUTO: 1.53 10*3/MM3 (ref 0.7–3.1)
LYMPHOCYTES NFR BLD AUTO: 28.8 % (ref 19.6–45.3)
MCH RBC QN AUTO: 29.7 PG (ref 26.6–33)
MCHC RBC AUTO-ENTMCNC: 31.6 G/DL (ref 31.5–35.7)
MCV RBC AUTO: 94.1 FL (ref 79–97)
MONOCYTES # BLD AUTO: 0.63 10*3/MM3 (ref 0.1–0.9)
MONOCYTES NFR BLD AUTO: 11.9 % (ref 5–12)
NEUTROPHILS NFR BLD AUTO: 2.25 10*3/MM3 (ref 1.7–7)
NEUTROPHILS NFR BLD AUTO: 42.4 % (ref 42.7–76)
NRBC BLD AUTO-RTO: 0 /100 WBC (ref 0–0.2)
PLATELET # BLD AUTO: 312 10*3/MM3 (ref 140–450)
PMV BLD AUTO: 10.1 FL (ref 6–12)
POTASSIUM SERPL-SCNC: 4.2 MMOL/L (ref 3.5–5.2)
RBC # BLD AUTO: 3.9 10*6/MM3 (ref 3.77–5.28)
SODIUM SERPL-SCNC: 137 MMOL/L (ref 136–145)
WBC NRBC COR # BLD: 5.31 10*3/MM3 (ref 3.4–10.8)

## 2023-10-24 PROCEDURE — 94618 PULMONARY STRESS TESTING: CPT

## 2023-10-24 PROCEDURE — 97110 THERAPEUTIC EXERCISES: CPT

## 2023-10-24 PROCEDURE — 94799 UNLISTED PULMONARY SVC/PX: CPT

## 2023-10-24 PROCEDURE — 85025 COMPLETE CBC W/AUTO DIFF WBC: CPT | Performed by: HOSPITALIST

## 2023-10-24 PROCEDURE — 99232 SBSQ HOSP IP/OBS MODERATE 35: CPT | Performed by: INTERNAL MEDICINE

## 2023-10-24 PROCEDURE — 80048 BASIC METABOLIC PNL TOTAL CA: CPT | Performed by: HOSPITALIST

## 2023-10-24 RX ORDER — SERTRALINE HYDROCHLORIDE 25 MG/1
50 TABLET, FILM COATED ORAL DAILY
Status: DISCONTINUED | OUTPATIENT
Start: 2023-10-25 | End: 2023-10-25 | Stop reason: HOSPADM

## 2023-10-24 RX ADMIN — CEPHALEXIN 500 MG: 500 CAPSULE ORAL at 00:31

## 2023-10-24 RX ADMIN — ALBUTEROL SULFATE 2.5 MG: 2.5 SOLUTION RESPIRATORY (INHALATION) at 00:34

## 2023-10-24 RX ADMIN — APIXABAN 10 MG: 5 TABLET, FILM COATED ORAL at 21:20

## 2023-10-24 RX ADMIN — ALBUTEROL SULFATE 2.5 MG: 2.5 SOLUTION RESPIRATORY (INHALATION) at 13:27

## 2023-10-24 RX ADMIN — MIDODRINE HYDROCHLORIDE 10 MG: 10 TABLET ORAL at 12:09

## 2023-10-24 RX ADMIN — ROPINIROLE HYDROCHLORIDE 3 MG: 1 TABLET, FILM COATED ORAL at 21:20

## 2023-10-24 RX ADMIN — APIXABAN 10 MG: 5 TABLET, FILM COATED ORAL at 09:10

## 2023-10-24 RX ADMIN — ACETAMINOPHEN 650 MG: 325 TABLET ORAL at 09:13

## 2023-10-24 RX ADMIN — MIDODRINE HYDROCHLORIDE 10 MG: 10 TABLET ORAL at 21:20

## 2023-10-24 RX ADMIN — OXYCODONE AND ACETAMINOPHEN 1 TABLET: 5; 325 TABLET ORAL at 22:37

## 2023-10-24 RX ADMIN — DOCUSATE SODIUM 50MG AND SENNOSIDES 8.6MG 2 TABLET: 8.6; 5 TABLET, FILM COATED ORAL at 09:10

## 2023-10-24 RX ADMIN — OXYCODONE AND ACETAMINOPHEN 1 TABLET: 5; 325 TABLET ORAL at 12:09

## 2023-10-24 RX ADMIN — MIDODRINE HYDROCHLORIDE 10 MG: 10 TABLET ORAL at 04:54

## 2023-10-24 RX ADMIN — CEPHALEXIN 500 MG: 500 CAPSULE ORAL at 05:26

## 2023-10-24 RX ADMIN — FAMOTIDINE 20 MG: 20 TABLET, FILM COATED ORAL at 21:20

## 2023-10-24 RX ADMIN — DOCUSATE SODIUM 50MG AND SENNOSIDES 8.6MG 2 TABLET: 8.6; 5 TABLET, FILM COATED ORAL at 21:20

## 2023-10-24 RX ADMIN — ALBUTEROL SULFATE 2.5 MG: 2.5 SOLUTION RESPIRATORY (INHALATION) at 07:55

## 2023-10-24 RX ADMIN — ALBUTEROL SULFATE 2.5 MG: 2.5 SOLUTION RESPIRATORY (INHALATION) at 18:41

## 2023-10-24 NOTE — PROGRESS NOTES
Pulmonary / Critical Care Progress Note      Patient Name: Chula Adkins  : 1940  MRN: [id number]  Attending:  Benjy Davies MD   Date of admission: 10/16/2023    Subjective   Subjective   Follow-up for respiratory failure, pneumonia, pulmonary embolism    Over past 24 hours, remains on supplemental O2, tolerating diet, remains on Ancef, continues nebs, remains on therapeutic Lovenox.    No acute events overnight.  Refused NIPPV.  On 2 liters  This morning,    Remains weak and fatigued    Review of Systems  General: Fatigue, otherwise denied complaints  HEENT: Denied complaints  Respiratory: Cough, otherwise denied complaints  Cardiovascular: Denied complaints  GI: Denied complaints  : Denied complaints  MSK: Weakness, otherwise denied complaints      Objective   Objective     Vitals:   Vital signs for last 24 hours:  Temp:  [97.7 °F (36.5 °C)-98.6 °F (37 °C)] 97.7 °F (36.5 °C)  Heart Rate:  [69-96] 73  Resp:  [18-20] 20  BP: (116-173)/(76-86) 156/86    Physical Exam   Vital Signs Reviewed   General:  Chronically ill-appearing, elderly female, alert, NAD, sitting up in chair  HEENT:  PERRL, EOMI. NG tube  Neck:  No JVD, no thyromegaly  Lymph: no axillary, cervical, supraclavicular lymphadenopathy noted bilaterally  Chest: Good aeration, clear to auscultation bilaterally, no work of breathing noted on 2L NC  CV: RRR, no M/G/R, pulses 2+  Abd:  Soft, NT, ND, +BS  EXT:  no clubbing, no cyanosis, trace BLE edema  Neuro:  A&Ox2, CN grossly intact, no focal deficits.  Skin: No rashes or lesions noted    Result Review    Result Review:  I have personally reviewed the results from the time of this admission to 10/24/2023 16:15 EDT and agree with these findings:  [x]  Laboratory  [x]  Microbiology  [x]  Radiology  []  EKG/Telemetry   [x]  Cardiology/Vascular   []  Pathology  []  Old records  []  Other:  Most notable findings include:     BAL and sputum culture positive for MSSA  Pneumonia panel positive  for MSSA    7/16 CT chest - worsening consolidation in lower lungs, PE noted in subsegmental left lower lobe pulmonary arterial branch and segmental left upper lobe branch    MRI brain with abnormal appearance of left maxillary antrum and left nasal cavity, 1 differential consideration would be antrochoanal polyp or an associated fungus ball        Lab 10/24/23  0536 10/23/23  0504 10/22/23  0957 10/21/23  0512 10/20/23  0527 10/19/23  0618 10/18/23  0329   WBC 5.31 5.70 7.37  --   --  9.81 9.83   HEMOGLOBIN 11.6* 11.7* 11.4*  --   --  10.9* 10.9*   HEMATOCRIT 36.7 37.3 35.9  --   --  33.8* 35.2   PLATELETS 312 291 289  --   --  173 133*   SODIUM 137 138 135* 138 139 140 143   POTASSIUM 4.2 4.7 4.5 4.2 4.1 3.9 3.7   CHLORIDE 99 103 99 103 106 108* 114*   CO2 26.9 25.6 24.3 28.9 26.6 24.0 20.3*   BUN 18 18 20 15 14 18 32*   CREATININE 0.89 0.86 0.92 0.80 0.78 0.76 1.04*   GLUCOSE 126* 86 136* 89 90 113* 133*   CALCIUM 10.1 9.7 9.6 9.5 9.5 9.3 8.9   PHOSPHORUS  --   --   --   --   --  1.7* 2.0*   TOTAL PROTEIN  --   --   --  5.6* 5.7* 5.6*  --    ALBUMIN  --   --   --  2.8* 2.9* 3.1*  --    GLOBULIN  --   --   --  2.8 2.8 2.5  --      Assessment & Plan   Assessment / Plan     Active Hospital Problems:  Active Hospital Problems    Diagnosis     **Acute respiratory failure with hypoxia      Impression:  Acute hypoxic and hypercapnic respiratory failure  Altered mental status, toxic metabolic encephalopathy  MSSA pneumonia  Concern for aspiration  Pulmonary embolism  History of hypothyroidism  Restless leg syndrome  Chronic pain syndrome    Plan:  -Currently on 3 L nasal cannula.  Continue to wean to maintain SPO2 greater than 90%  -Continue NIPPV as needed  Continue eliquis   -Continue Ancef for MSSA pneumonia  -Continue bronchodilator and bronchopulmonary hygiene  -Continue MetaNeb for aggressive airway clearance  -Continue albuterol nebs  -EEG showed encephalopathy 10/16  -SLP on board.  Appreciate assistance.  Diet  per them.  -Trend electrolytes and renal panel.  Replace electrolytes as needed.  -PT/OT on board.  Appreciate assistance.  -Encourage mobilization as able.  Out of bed to chair.    Reviewed labs, imaging and provider notes.  Discussed with bedside nurse and primary service.    DVT prophylaxis: Heparin drip  Medical and mechanical DVT prophylaxis orders are present.    CODE STATUS:   Code Status (Patient has no pulse and is not breathing): CPR (Attempt to Resuscitate)  Medical Interventions (Patient has pulse or is breathing): Full Support      This visit was performed by BOTH a physician and an APC. I personally evaluated and examined the patient. I performed all aspects of MDM as documented. , I have reviewed and confirmed the accuracy of the patient's history as documented in this note., and I have reexamined the patient and the results are consistent with the previously documented exam. I have updated the documentation as necessary.     Electronically signed by Tru Philippe DO, 10/24/23, 4:15 PM EDT.

## 2023-10-24 NOTE — THERAPY TREATMENT NOTE
Acute Care - Physical Therapy Treatment Note   James     Patient Name: Chula Adkins  : 1940  MRN: [id number]  Today's Date: 10/24/2023      Visit Dx:     ICD-10-CM ICD-9-CM   1. Altered mental status, unspecified altered mental status type  R41.82 780.97   2. Difficulty walking  R26.2 719.7     Patient Active Problem List   Diagnosis    Acute respiratory failure with hypoxia     History reviewed. No pertinent past medical history.  History reviewed. No pertinent surgical history.  PT Assessment (last 12 hours)       PT Evaluation and Treatment       Row Name 10/24/23 1409          Physical Therapy Time and Intention    Subjective Information complains of;weakness;fatigue;pain  -DK     Document Type therapy note (daily note)  -DK     Mode of Treatment individual therapy;physical therapy  -DK     Patient Effort good  -DK     Symptoms Noted During/After Treatment fatigue;increased pain  -DK     Comment Pt reports a history of fibromyalgia, hurting all over and worn out today.  -       Row Name 10/24/23 1409          Pain    Pretreatment Pain Rating 5/10  -DK     Posttreatment Pain Rating 5/10  -DK     Pain Location generalized  -DK     Pain Intervention(s) Distraction;Therapeutic presence  -DK       Row Name 10/24/23 1409          Cognition    Affect/Mental Status (Cognition) WNL  -DK     Orientation Status (Cognition) oriented x 4  -DK     Follows Commands (Cognition) WNL  -DK     Cognitive Function WNL  -DK     Personal Safety Interventions gait belt;nonskid shoes/slippers when out of bed;supervised activity  -DK       Row Name 10/24/23 1409          Motor Skills    Motor Skills --  therapeutic exercises  -DK     Coordination WFL  -DK     Therapeutic Exercise hip;knee;ankle  -DK     Additional Documentation --  Pt deferred transfers due to pain/fatigue.  -       Row Name 10/24/23 1409          Hip (Therapeutic Exercise)    Hip (Therapeutic Exercise) AROM (active range of motion)  -DK     Hip  AROM (Therapeutic Exercise) bilateral;flexion;extension;aBduction;aDduction;sitting;30 repititions  -DK       Row Name 10/24/23 1409          Knee (Therapeutic Exercise)    Knee (Therapeutic Exercise) AROM (active range of motion)  -DK     Knee AROM (Therapeutic Exercise) bilateral;flexion;extension;LAQ (long arc quad);sitting;30 repititions  -DK       Row Name 10/24/23 1409          Ankle (Therapeutic Exercise)    Ankle (Therapeutic Exercise) AROM (active range of motion)  -DK     Ankle AROM (Therapeutic Exercise) bilateral;dorsiflexion;plantarflexion;sitting;30 repititions  -DK       Row Name 10/24/23 1409          Plan of Care Review    Plan of Care Reviewed With patient  -DK     Progress no change  -       Row Name 10/24/23 1409          Positioning and Restraints    Pre-Treatment Position sitting in chair/recliner  -DK     Post Treatment Position chair  -DK     In Chair sitting;call light within reach;encouraged to call for assist;exit alarm on  -       Row Name 10/24/23 1409          Therapy Assessment/Plan (PT)    Rehab Potential (PT) good, to achieve stated therapy goals  -     Criteria for Skilled Interventions Met (PT) skilled treatment is necessary  -DK     Therapy Frequency (PT) daily  -DK     Problem List (PT) problems related to;strength;pain;mobility  -       Row Name 10/24/23 140          Progress Summary (PT)    Progress Toward Functional Goals (PT) progress toward functional goals is good  -               User Key  (r) = Recorded By, (t) = Taken By, (c) = Cosigned By      Initials Name Provider Type    Odalys Aguirre PTA Physical Therapist Assistant                    Physical Therapy Education       Title: PT OT SLP Therapies (Done)       Topic: Physical Therapy (Done)       Point: Mobility training (Done)       Learning Progress Summary             Patient Acceptance, E,TB, VU by  at 10/19/2023 1057    Acceptance, E,TB, VU by AV at 10/18/2023 1329                         Point:  Home exercise program (Done)       Learning Progress Summary             Patient Acceptance, E,TB, VU by  at 10/19/2023 1057                         Point: Body mechanics (Done)       Learning Progress Summary             Patient Acceptance, E,TB, VU by  at 10/19/2023 1057    Acceptance, E,TB, VU by  at 10/18/2023 1329                         Point: Precautions (Done)       Learning Progress Summary             Patient Acceptance, E,TB, VU by  at 10/19/2023 1057    Acceptance, E,TB, VU by  at 10/18/2023 1329                                         User Key       Initials Effective Dates Name Provider Type Discipline     06/11/21 -  Terrell Mcelroy, PT Physical Therapist PT     09/12/22 -  Kim Longoria, RN Registered Nurse Nurse                  PT Recommendation and Plan  Planned Therapy Interventions (PT): balance training, bed mobility training, gait training, home exercise program, strengthening, transfer training  Therapy Frequency (PT): daily  Progress Summary (PT)  Progress Toward Functional Goals (PT): progress toward functional goals is good  Plan of Care Reviewed With: patient  Progress: no change   Outcome Measures       Row Name 10/24/23 1409 10/23/23 1053          How much help from another person do you currently need...    Turning from your back to your side while in flat bed without using bedrails? 4  -DK 4  -DK     Moving from lying on back to sitting on the side of a flat bed without bedrails? 4  -DK 4  -DK     Moving to and from a bed to a chair (including a wheelchair)? 3  -DK 3  -DK     Standing up from a chair using your arms (e.g., wheelchair, bedside chair)? 4  -DK 4  -DK     Climbing 3-5 steps with a railing? 3  -DK 3  -DK     To walk in hospital room? 3  -DK 3  -DK     AM-PAC 6 Clicks Score (PT) 21  -DK 21  -DK     Highest level of mobility 6 --> Walked 10 steps or more  -DK 6 --> Walked 10 steps or more  -DK        Functional Assessment    Outcome Measure Options AM-PAC 6  Clicks Basic Mobility (PT)  -DK AM-PAC 6 Clicks Basic Mobility (PT)  -DK               User Key  (r) = Recorded By, (t) = Taken By, (c) = Cosigned By      Initials Name Provider Type    Odalys Aguirre PTA Physical Therapist Assistant                     Time Calculation:    PT Charges       Row Name 10/24/23 1412             Time Calculation    PT Received On 10/24/23  -DK      PT Goal Re-Cert Due Date 10/27/23  -DK         Timed Charges    45587 - PT Therapeutic Exercise Minutes 12  -DK      29134 - PT Therapeutic Activity Minutes 5  -DK         Total Minutes    Timed Charges Total Minutes 17  -DK       Total Minutes 17  -DK                User Key  (r) = Recorded By, (t) = Taken By, (c) = Cosigned By      Initials Name Provider Type    Odalys Aguirre PTA Physical Therapist Assistant                  Therapy Charges for Today       Code Description Service Date Service Provider Modifiers Qty    40315267763 HC PT THER PROC EA 15 MIN 10/23/2023 Odalys Mcdaniel, PTA GP 1    36241350730 HC GAIT TRAINING EA 15 MIN 10/23/2023 Odalys Mcdaniel, PTA GP 1    08637581259 HC PT THERAPEUTIC ACT EA 15 MIN 10/23/2023 Odalys Mcdaniel, PTA GP 1    29632803394 HC PT THER PROC EA 15 MIN 10/24/2023 Odalys Mcdaniel, LIBAN GP 1            PT G-Codes  Outcome Measure Options: AM-PAC 6 Clicks Basic Mobility (PT)  AM-PAC 6 Clicks Score (PT): 21  AM-PAC 6 Clicks Score (OT): 19    Odalys Mcdaniel PTA  10/24/2023

## 2023-10-24 NOTE — PLAN OF CARE
Problem: Device-Related Complication Risk (Mechanical Ventilation, Invasive)  Goal: Optimal Device Function  Outcome: Ongoing, Progressing     Problem: Inability to Wean (Mechanical Ventilation, Invasive)  Goal: Mechanical Ventilation Liberation  Outcome: Ongoing, Progressing     Problem: Nutrition Impairment (Mechanical Ventilation, Invasive)  Goal: Optimal Nutrition Delivery  Outcome: Ongoing, Progressing     Problem: Skin and Tissue Injury (Mechanical Ventilation, Invasive)  Goal: Absence of Device-Related Skin and Tissue Injury  Outcome: Ongoing, Progressing     Problem: Ventilator-Induced Lung Injury (Mechanical Ventilation, Invasive)  Goal: Absence of Ventilator-Induced Lung Injury  Outcome: Ongoing, Progressing     Problem: Adult Inpatient Plan of Care  Goal: Plan of Care Review  Outcome: Ongoing, Progressing  Flowsheets (Taken 10/24/2023 1622)  Progress: no change  Plan of Care Reviewed With: patient  Outcome Evaluation: VSS. PT STATES SHE HAS NOT BEEN FEELING GOOD TODAY. PT REPORTED HEAD ACHE GAVE PAIN MEDICATION AS ORDERED. NO NEW CONCERNS AT THIS TIME.     Problem: Skin Injury Risk Increased  Goal: Skin Health and Integrity  Outcome: Ongoing, Progressing     Problem: Fall Injury Risk  Goal: Absence of Fall and Fall-Related Injury  Outcome: Ongoing, Progressing  Intervention: Promote Injury-Free Environment  Recent Flowsheet Documentation  Taken 10/24/2023 1110 by Cleve Horner, RN  Safety Promotion/Fall Prevention: safety round/check completed  Taken 10/24/2023 0910 by Cleve Horner, RN  Safety Promotion/Fall Prevention: safety round/check completed  Taken 10/24/2023 0745 by Cleve Horner, RN  Safety Promotion/Fall Prevention: safety round/check completed     Problem: Restraint, Nonviolent  Goal: Absence of Harm or Injury  Outcome: Ongoing, Progressing     Problem: Palliative Care  Goal: Enhanced Quality of Life  Outcome: Ongoing, Progressing  Intervention: Maximize Comfort  Recent Flowsheet  Documentation  Taken 10/24/2023 0910 by Cleve Horner, RN  Pain Management Interventions: see MAR     Problem: Noninvasive Ventilation Acute  Goal: Effective Unassisted Ventilation and Oxygenation  Outcome: Ongoing, Progressing   Goal Outcome Evaluation:  Plan of Care Reviewed With: patient        Progress: no change  Outcome Evaluation: VSS. PT STATES SHE HAS NOT BEEN FEELING GOOD TODAY. PT REPORTED HEAD ACHE GAVE PAIN MEDICATION AS ORDERED. NO NEW CONCERNS AT THIS TIME.

## 2023-10-24 NOTE — PLAN OF CARE
Goal Outcome Evaluation:  Plan of Care Reviewed With: patient        Progress: improving  Outcome Evaluation: Patient resting in bed, talking with family on phone, no c/o pain or discomfort, no shortness of breath, patient on room air, call light within reach, patient ambulated in jeff with walker this shift. patient alert and oriented

## 2023-10-24 NOTE — POST-PROCEDURE NOTE
Respiratory Therapist Walking Oximetry Progress Note      Patient Name:  Chula Adkins  YOB: 1940  Date of Procedure: 10/24/23              ROOM AIR BASELINE   SpO2% 93   Heart Rate 80     EXERCISE ON ROOM AIR SpO2% EXERCISE ON O2 @  LPM SpO2%   1 MINUTE 94 1 MINUTE    2 MINUTES 89 2 MINUTES    3 MINUTES 90 3 MINUTES    4 MINUTES 93 4 MINUTES    5 MINUTES 94 5 MINUTES    6 MINUTES 94 6 MINUTES               SpO2% Post Exercise  94   HR Post Exercise  92   Time to Recovery  n/a      Comments: Patient tolerated walk well with walker to assist.  No complaints of shortness of breath during the study.          Electronically signed by Melinda Carreon, VANESA, 10/24/23, 3:19 PM EDT.

## 2023-10-24 NOTE — PLAN OF CARE
Goal Outcome Evaluation:         Patient did not wear bipap last night.  Patient on 2lpm n/c.

## 2023-10-24 NOTE — PROGRESS NOTES
[facility]   Progress Note    Patient Name: Chula Adkins  : 1940  MRN: [id number]  Primary Care Physician:  Owen Mathias MD  Date of admission: 10/16/2023    Subjective   Subjective     Chief Complaint: f/u confusion shortness of breath     Interval f/u: Doesn't feel good today; feels like it is her fibromyalgia    Objective   Objective     Vitals:   Temp:  [97.7 °F (36.5 °C)-98.6 °F (37 °C)] 97.7 °F (36.5 °C)  Heart Rate:  [69-96] 73  Resp:  [18-20] 20  BP: (116-173)/(76-86) 156/86  Flow (L/min):  [2] 2    Gen: Pleasant female, lying in bed, no distress but seems tired.  HEENT: NCAT  Eyes: No icterus  CV: S1-S2, no JVD  Chest: Clear bilaterally, no wheezes  Abdomen: soft nondistended  Neuro: No gross focal deficits, speech clear  Psych, mood and affect are appropriate       Result Review    Result Review:  I have reviewed the following:  [x]  Laboratory  CBC          10/22/2023    09:57 10/23/2023    05:04 10/24/2023    05:36   CBC   WBC 7.37  5.70  5.31    RBC 3.83  3.93  3.90    Hemoglobin 11.4  11.7  11.6    Hematocrit 35.9  37.3  36.7    MCV 93.7  94.9  94.1    MCH 29.8  29.8  29.7    MCHC 31.8  31.4  31.6    RDW 12.7  12.4  12.4    Platelets 289  291  312      CMP          10/22/2023    09:57 10/23/2023    05:04 10/24/2023    05:36   CMP   Glucose 136  86  126    BUN 20  18  18    Creatinine 0.92  0.86  0.89    EGFR 62.7  68.0  65.2    Sodium 135  138  137    Potassium 4.5  4.7  4.2    Chloride 99  103  99    Calcium 9.6  9.7  10.1    BUN/Creatinine Ratio 21.7  20.9  20.2    Anion Gap 11.7  9.4  11.1          []  Microbiology  []  Radiology  []  EKG/Telemetry   []  Cardiology/Vascular   []  Pathology  []  Old records  []  Other:        Assessment & Plan   Assessment / Plan     Active Hospital Problems:  Active Hospital Problems    Diagnosis     **Acute respiratory failure with hypoxia      Assessment:  Severe sepsis with shock, resolved  Acute hypoxemic and hypercapnic respiratory  failure, extubated 10/17  Right more than left basilar pneumonia likely aspiration. MSSA.S/p bronchoscopy.  Left upper lobe segmental and left lower lobe subsegmental acute PE.  Unprovoked first episode.    DIANA, resolved  Gas in bilateral subclavian and brachiocephalic vein.  Likely from intravenous intervention in the ED or by EMS.  Improving.  No intervention as per vascular.  Chronic pain syndrome on Percocet.    Restless leg syndrome.  History of diabetes mellitus diet controlled.  Hemoglobin A1c of 5.7%  Hypertension diet controlled.  Hypothyroidism.  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  Mild thrombocytopenia likely from sepsis.  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 Felt this was more of a chronic issue since she had denied any purulent drainage and they will see pt in office in 4 to 6 wks from d/c to discuss surgical options at that time         Pulmonary following appreciate input.  S/p bronchoscopy  Cx growing MSSA. Complete abx course with Keflex  Bronch culture and PCR positive for Staph aureus.  Started on eliquis on 10/22/2023, no sign of bleeding  Resume appropriate home medication. Restarted home oxycodone.  Patient follows with pain management.   Vascular followed no intervention needed for gas in her subclavian and brachiocephalic veins.  improved on second CT scan.  2D echo.  Noted with EF of 58%  EEG no epileptiform discharges.  Diffuse encephalopathy.  Continue PT/OT  Seems to be improving quite nicely, begin discharge planning process  Anticipate dc tomorrow if no new issues  Discussed with pulmonologist      DVT prophylaxis:  Medical and mechanical DVT prophylaxis orders are present.    CODE STATUS:    DNR    Disposition:  home    Electronically signed by Benjy Davies MD, 10/24/23, 5:57 PM EDT.

## 2023-10-25 VITALS
RESPIRATION RATE: 18 BRPM | DIASTOLIC BLOOD PRESSURE: 76 MMHG | OXYGEN SATURATION: 91 % | HEIGHT: 64 IN | SYSTOLIC BLOOD PRESSURE: 105 MMHG | HEART RATE: 89 BPM | BODY MASS INDEX: 34.93 KG/M2 | WEIGHT: 204.59 LBS | TEMPERATURE: 98.6 F

## 2023-10-25 LAB
ANION GAP SERPL CALCULATED.3IONS-SCNC: 10.1 MMOL/L (ref 5–15)
BASOPHILS # BLD AUTO: 0.1 10*3/MM3 (ref 0–0.2)
BASOPHILS NFR BLD AUTO: 1.6 % (ref 0–1.5)
BUN SERPL-MCNC: 16 MG/DL (ref 8–23)
BUN/CREAT SERPL: 22.9 (ref 7–25)
CALCIUM SPEC-SCNC: 9.9 MG/DL (ref 8.6–10.5)
CHLORIDE SERPL-SCNC: 100 MMOL/L (ref 98–107)
CO2 SERPL-SCNC: 25.9 MMOL/L (ref 22–29)
CREAT SERPL-MCNC: 0.7 MG/DL (ref 0.57–1)
DEPRECATED RDW RBC AUTO: 41.5 FL (ref 37–54)
EGFRCR SERPLBLD CKD-EPI 2021: 87 ML/MIN/1.73
EOSINOPHIL # BLD AUTO: 0.54 10*3/MM3 (ref 0–0.4)
EOSINOPHIL NFR BLD AUTO: 8.8 % (ref 0.3–6.2)
ERYTHROCYTE [DISTWIDTH] IN BLOOD BY AUTOMATED COUNT: 12.3 % (ref 12.3–15.4)
GLUCOSE SERPL-MCNC: 97 MG/DL (ref 65–99)
HCT VFR BLD AUTO: 35.8 % (ref 34–46.6)
HGB BLD-MCNC: 11.4 G/DL (ref 12–15.9)
IMM GRANULOCYTES # BLD AUTO: 0.05 10*3/MM3 (ref 0–0.05)
IMM GRANULOCYTES NFR BLD AUTO: 0.8 % (ref 0–0.5)
LYMPHOCYTES # BLD AUTO: 1.84 10*3/MM3 (ref 0.7–3.1)
LYMPHOCYTES NFR BLD AUTO: 29.8 % (ref 19.6–45.3)
MCH RBC QN AUTO: 29.5 PG (ref 26.6–33)
MCHC RBC AUTO-ENTMCNC: 31.8 G/DL (ref 31.5–35.7)
MCV RBC AUTO: 92.5 FL (ref 79–97)
MONOCYTES # BLD AUTO: 0.8 10*3/MM3 (ref 0.1–0.9)
MONOCYTES NFR BLD AUTO: 13 % (ref 5–12)
NEUTROPHILS NFR BLD AUTO: 2.84 10*3/MM3 (ref 1.7–7)
NEUTROPHILS NFR BLD AUTO: 46 % (ref 42.7–76)
NRBC BLD AUTO-RTO: 0 /100 WBC (ref 0–0.2)
PLATELET # BLD AUTO: 349 10*3/MM3 (ref 140–450)
PMV BLD AUTO: 9.8 FL (ref 6–12)
POTASSIUM SERPL-SCNC: 4.3 MMOL/L (ref 3.5–5.2)
RBC # BLD AUTO: 3.87 10*6/MM3 (ref 3.77–5.28)
SODIUM SERPL-SCNC: 136 MMOL/L (ref 136–145)
WBC NRBC COR # BLD: 6.17 10*3/MM3 (ref 3.4–10.8)

## 2023-10-25 PROCEDURE — 94799 UNLISTED PULMONARY SVC/PX: CPT

## 2023-10-25 PROCEDURE — 94664 DEMO&/EVAL PT USE INHALER: CPT

## 2023-10-25 PROCEDURE — 85025 COMPLETE CBC W/AUTO DIFF WBC: CPT | Performed by: HOSPITALIST

## 2023-10-25 PROCEDURE — 92526 ORAL FUNCTION THERAPY: CPT

## 2023-10-25 PROCEDURE — 80048 BASIC METABOLIC PNL TOTAL CA: CPT | Performed by: HOSPITALIST

## 2023-10-25 PROCEDURE — 99239 HOSP IP/OBS DSCHRG MGMT >30: CPT | Performed by: INTERNAL MEDICINE

## 2023-10-25 RX ORDER — MIDODRINE HYDROCHLORIDE 5 MG/1
5 TABLET ORAL
Qty: 60 TABLET | Refills: 0 | Status: SHIPPED | OUTPATIENT
Start: 2023-10-25 | End: 2023-11-24

## 2023-10-25 RX ADMIN — SERTRALINE 50 MG: 25 TABLET, FILM COATED ORAL at 08:27

## 2023-10-25 RX ADMIN — DOCUSATE SODIUM 50MG AND SENNOSIDES 8.6MG 2 TABLET: 8.6; 5 TABLET, FILM COATED ORAL at 08:28

## 2023-10-25 RX ADMIN — ALBUTEROL SULFATE 2.5 MG: 2.5 SOLUTION RESPIRATORY (INHALATION) at 11:49

## 2023-10-25 RX ADMIN — APIXABAN 10 MG: 5 TABLET, FILM COATED ORAL at 08:27

## 2023-10-25 RX ADMIN — ALBUTEROL SULFATE 2.5 MG: 2.5 SOLUTION RESPIRATORY (INHALATION) at 07:00

## 2023-10-25 RX ADMIN — ALBUTEROL SULFATE 2.5 MG: 2.5 SOLUTION RESPIRATORY (INHALATION) at 00:27

## 2023-10-25 RX ADMIN — ACETAMINOPHEN 650 MG: 325 TABLET ORAL at 06:01

## 2023-10-25 NOTE — PLAN OF CARE
Goal Outcome Evaluation: Patient refused BIPAP tonight. Has worn 2L  and also been on RA tonight.

## 2023-10-25 NOTE — PLAN OF CARE
Goal Outcome Evaluation:  Plan of Care Reviewed With: patient        Progress: no change     PATIENT MEDICATED FOR PAIN X1, COMPLAINT OF NAUSEA RELIEVED ON OWN, VSS

## 2023-10-25 NOTE — THERAPY TREATMENT NOTE
Acute Care - Speech Language Pathology   Swallow Treatment Note ABILIO James     Patient Name: Chula Adkins  : 1940  MRN: [id number]  Today's Date: 10/25/2023               Admit Date: 10/16/2023    Visit Dx:     ICD-10-CM ICD-9-CM   1. Altered mental status, unspecified altered mental status type  R41.82 780.97   2. Difficulty walking  R26.2 719.7     Patient Active Problem List   Diagnosis    Acute respiratory failure with hypoxia     History reviewed. No pertinent past medical history.  History reviewed. No pertinent surgical history.  SPEECH PATHOLOGY DYSPHAGIA TREATMENT    Subjective/Behavioral Observations: Patient seen for dysphagia therapy    Current Diet: Mechanical soft diet single sips of thin liquids.     Treatment received: Patient much more awake and alert.  Patient tolerating soft solids and thin  liquids without clinical sign or symptom of aspiration.  Patient reported being nauseated last night.  No vomiting.  Patient was agreeable to po trials, only limited trials presented. No clinical s/s of aspiration noted.  Denies globus sensation.     Results of treatment: As stated    Progress toward goals: Progress noted    Barriers to Achieving goals: Medical status    Plan of care:/changes in plan: Mechanical soft diet - thin liquids  90 degrees upright for all intake  Slow rate, small bites/drinks  Oral meds whole in applesauce  Feed only when awake and alert      EDUCATION  The patient has been educated in the following areas:   Dysphagia (Swallowing Impairment).        Call light in reach, bed in lowest position and bed alarm set.     Cesilia Courtney, SLP  10/25/2023

## 2023-10-25 NOTE — PLAN OF CARE
Goal Outcome Evaluation:         Patient does not wear Bipap at HS.  Maintaining SPO2 levels on room air.

## 2023-10-25 NOTE — DISCHARGE SUMMARY
[facility]         HOSPITALIST  DISCHARGE SUMMARY    Patient Name: Chula Adkins  : 1940  MRN: [id number]    Date of Admission: 10/16/2023  Date of Discharge:  10/25/2023  Primary Care Physician: Owen Mathias MD  Admitting: Medicine  Consultants: Pulmonology; ENT    Final Diagnoses:  Severe sepsis with shock, resolved  Acute hypoxemic and hypercapnic respiratory failure, extubated 10/17  MSSA pneumonia  Left upper lobe segmental and left lower lobe subsegmental acute PE.  Unprovoked first episode.    DIANA, resolved  Gas in bilateral subclavian and brachiocephalic vein.  Likely from intravenous intervention in the ED or by EMS.  Improving.  No intervention as per vascular.  Chronic pain syndrome on Percocet.    Restless leg syndrome.  History of diabetes mellitus diet controlled.  Hemoglobin A1c of 5.7%  Hypertension diet controlled.  Hypothyroidism.  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  Mild thrombocytopenia likely from sepsis.  Acute metabolic encephalopathy,improved        Hospital Course     Hospital Course:  84 yo F with DM, HTN, RLS, chronic pain presenting after being found confused and then unresponsive at home.  EMS found her hypoxic and she required intubation.  Reportedly had part of a tube of chapstick in her throat.  She was found to have MSSA pneumonia and completed a course of antibiotics.  She has been successfully extubated and is now on room air.  ENT saw patient for abnormal MRI and advised outpatient followup.  She was found to have PE and is on Eliquis.  She had been started on midodrine and BP's are improving.  We will decrease this upon discharge and possibly can come off of it as outpatient.  She passed a walk test and did not qualify for home oxygen.  Rehab was discussed but patient now feels like she is good to go home.  She is to followup with her PCP.    DISCHARGE Follow Up Recommendations for labs and  diagnostics: pcp, ent      Day of Discharge     Vital Signs:  Temp:  [97.7 °F (36.5 °C)-98.7 °F (37.1 °C)] 98.6 °F (37 °C)  Heart Rate:  [73-91] 89  Resp:  [18-20] 18  BP: (105-156)/(76-88) 105/76  Flow (L/min):  [2] 2  Physical Exam: nad s1s2 chest clear      Discharge Details        Discharge Medications        New Medications        Instructions Start Date   apixaban 5 MG tablet tablet  Commonly known as: ELIQUIS   Take 2 tablets by mouth 2 (Two) Times a Day for 4 days, THEN 1 tablet 2 (Two) Times a Day for 30 days. Indications: DVT/PE (active thrombosis)   Start Date: October 25, 2023     midodrine 5 MG tablet  Commonly known as: PROAMATINE   5 mg, Oral, 2 Times Daily Before Meals             Continue These Medications        Instructions Start Date   buPROPion  MG 24 hr tablet  Commonly known as: WELLBUTRIN XL   300 mg, Oral, Every Morning      carvedilol 3.125 MG tablet  Commonly known as: COREG   3.125 mg, Oral, 2 Times Daily With Meals      fish oil 1000 MG capsule capsule   1,000 mg, Oral, Daily With Breakfast      multivitamin with minerals tablet tablet   1 tablet, Oral, Daily      oxyCODONE-acetaminophen  MG per tablet  Commonly known as: PERCOCET   1 tablet, Oral, Every 12 Hours PRN      PREVAGEN PO   1 tablet, Oral, Daily      rOPINIRole 3 MG tablet  Commonly known as: REQUIP   3 mg, Oral, Nightly      sertraline 50 MG tablet  Commonly known as: ZOLOFT   50 mg, Oral, Daily      simvastatin 40 MG tablet  Commonly known as: ZOCOR   40 mg, Oral, Nightly      Synthroid 25 MCG tablet  Generic drug: levothyroxine   25 mcg, Oral, Daily      Trulance 3 MG tablet  Generic drug: Plecanatide   1 tablet, Oral, Daily             Stop These Medications      amitriptyline 50 MG tablet  Commonly known as: ELAVIL     bumetanide 1 MG tablet  Commonly known as: BUMEX              No Active Allergies    Discharge Disposition:  Home-Health Care [facility]    Diet:  Hospital:  Diet Order   Procedures    Diet:  Regular/House Diet; Texture: Soft to Chew (NDD 3); Soft to Chew: Ground Meat; Fluid Consistency: Thin (IDDSI 0)       Discharge Activity:       CODE STATUS:  Code Status and Medical Interventions:   Ordered at: 10/17/23 1112     Code Status (Patient has no pulse and is not breathing):    CPR (Attempt to Resuscitate)     Medical Interventions (Patient has pulse or is breathing):    Full Support         No future appointments.        Pertinent  and/or Most Recent Results     PROCEDURES:   Intubation, bronch    LAB RESULTS:      Lab 10/25/23  0422 10/24/23  0536 10/23/23  0504 10/22/23  0957 10/19/23  0618   WBC 6.17 5.31 5.70 7.37 9.81   HEMOGLOBIN 11.4* 11.6* 11.7* 11.4* 10.9*   HEMATOCRIT 35.8 36.7 37.3 35.9 33.8*   PLATELETS 349 312 291 289 173   NEUTROS ABS 2.84 2.25 2.27 3.93 7.42*   IMMATURE GRANS (ABS) 0.05 0.08* 0.11* 0.10* 0.07*   LYMPHS ABS 1.84 1.53 1.78 1.49 0.97   MONOS ABS 0.80 0.63 0.66 0.90 0.76   EOS ABS 0.54* 0.74* 0.76* 0.86* 0.52*   MCV 92.5 94.1 94.9 93.7 92.3         Lab 10/25/23  0422 10/24/23  0536 10/23/23  0504 10/22/23  0957 10/21/23  0512 10/20/23  0527 10/19/23  0618   SODIUM 136 137 138 135* 138   < > 140   POTASSIUM 4.3 4.2 4.7 4.5 4.2   < > 3.9   CHLORIDE 100 99 103 99 103   < > 108*   CO2 25.9 26.9 25.6 24.3 28.9   < > 24.0   ANION GAP 10.1 11.1 9.4 11.7 6.1   < > 8.0   BUN 16 18 18 20 15   < > 18   CREATININE 0.70 0.89 0.86 0.92 0.80   < > 0.76   EGFR 87.0 65.2 68.0 62.7 74.1   < > 78.8   GLUCOSE 97 126* 86 136* 89   < > 113*   CALCIUM 9.9 10.1 9.7 9.6 9.5   < > 9.3   MAGNESIUM  --   --   --   --   --   --  1.8   PHOSPHORUS  --   --   --   --   --   --  1.7*    < > = values in this interval not displayed.         Lab 10/21/23  0512 10/20/23  0527 10/19/23  0618   TOTAL PROTEIN 5.6* 5.7* 5.6*   ALBUMIN 2.8* 2.9* 3.1*   GLOBULIN 2.8 2.8 2.5   ALT (SGPT) 7 8 10   AST (SGOT) 13 12 12   BILIRUBIN 0.5 0.9 0.7   ALK PHOS 103 112 94                     Brief Urine Lab Results  (Last result in  the past 365 days)        Color   Clarity   Blood   Leuk Est   Nitrite   Protein   CREAT   Urine HCG        10/16/23 0812 Yellow   Clear   Negative   Negative   Negative   Negative                 Microbiology Results (last 10 days)       Procedure Component Value - Date/Time    Aspergillus Galactomannan Antigen - Blood, Hand, Right [[id number]] Collected: 10/17/23 1325    Lab Status: Final result Specimen: Blood from Hand, Right Updated: 10/20/23 0106     Aspergillus Ag, BAL/Serum 0.04 Index     Narrative:      Performed at:  01 - [facility]  [address], [place]  [id number]  : Luciana Garcia MD, Phone:  [phone]  Performed at:  02 - [facility]  [address], [place]  [id number]  : Margareth Hays [facility], Phone:  [phone]    Pneumonia Panel - Lavage, Lung, Right Lower Lobe [[id number]]  (Abnormal) Collected: 10/16/23 1457    Lab Status: Final result Specimen: Lavage from Lung, Right Lower Lobe Updated: 10/16/23 8373     Escherichia coli PCR Not Detected     Acinetobacter calcoaceticus-baumannii complex PCR Not Detected     Enterobacter cloacae PCR Not Detected     Klebsiella oxytoca PCR Not Detected     Klebsiella pneumoniae group PCR Not Detected     Klebsiella aerogenes PCR Not Detected     Moraxella catarrhalis PCR Not Detected     Proteus species PCR Not Detected     Pseudomonas aeroginosa PCR Not Detected     Serratia marcescens PCR Not Detected     Staphylococcus aureus PCR Detected     Comment: >=10^7 Bin Copies/mL        Streptococcus pyogenes PCR Not Detected     Haemophilus influenzae PCR Not Detected     Streptococcus agalactiae PCR Not Detected     Streptococcus pneumoniae PCR Not Detected     Chlamydophila pneumoniae PCR Not Detected     Legionella pneumophilia PCR Not Detected     Mycoplasma pneumo by PCR Not Detected     ADENOVIRUS, PCR Not Detected     CTX-M Gene N/A     IMP Gene N/A     KPC Gene N/A     mecA/C and MREJ Gene Not Detected     NDM  Gene N/A     OXA-48-like Gene N/A     VIM Gene N/A     Coronavirus Not Detected     Human Metapneumovirus Not Detected     Human Rhinovirus/Enterovirus Not Detected     Influenza A PCR Not Detected     Influenza B PCR Not Detected     RSV, PCR Not Detected     Parainfluenza virus PCR Not Detected    BAL Culture, Quantitative - Lavage, Bronchus [[id number]]  (Abnormal)  (Susceptibility) Collected: 10/16/23 1457    Lab Status: Final result Specimen: Lavage from Bronchus Updated: 10/19/23 0931     BAL Culture >100,000 CFU/mL Staphylococcus aureus      50,000 CFU/mL Normal Respiratory Alley     Gram Stain Few (2+) WBCs seen      Few (2+) Gram positive cocci in pairs and chains      Few (2+) Gram negative bacilli      Few (2+) Yeast    Susceptibility        Staphylococcus aureus      DARSHANA      Clindamycin Susceptible      Inducible Clindamycin Resistance Negative      Oxacillin Susceptible      Tetracycline Susceptible      Trimethoprim + Sulfamethoxazole Susceptible      Vancomycin Susceptible                       Susceptibility Comments       Staphylococcus aureus    This isolate does not demonstrate inducible clindamycin resistance in vitro.                 MRSA Screen, PCR (Inpatient) - Swab, Nares [[id number]]  (Normal) Collected: 10/16/23 1437    Lab Status: Final result Specimen: Swab from Nares Updated: 10/16/23 1559     MRSA PCR No MRSA Detected    Narrative:      The negative predictive value of this diagnostic test is high and should only be used to consider de-escalating anti-MRSA therapy. A positive result may indicate colonization with MRSA and must be correlated clinically.    Blood Culture - Blood, Arm, Left [[id number]]  (Normal) Collected: 10/16/23 0827    Lab Status: Final result Specimen: Blood from Arm, Left Updated: 10/21/23 0931     Blood Culture No growth at 5 days    Blood Culture - Blood, Arm, Left [[id number]]  (Normal) Collected: 10/16/23 0822    Lab Status: Final result Specimen: Blood from Arm,  Left Updated: 10/21/23 0931     Blood Culture No growth at 5 days    Respiratory Culture - Sputum, ET Suction [[id number]]  (Abnormal) Collected: 10/16/23 0816    Lab Status: Final result Specimen: Sputum from ET Suction Updated: 10/19/23 0931     Respiratory Culture Scant growth (1+) Staphylococcus aureus      Rare Normal Respiratory Alley     Gram Stain Rare (1+) WBCs seen      Rare (1+) Gram positive cocci    Narrative:      Refer to BAL Culture from same day for MICS.    S. Pneumo Ag Urine or CSF - Urine, Urine, Clean Catch [[id number]]  (Normal) Collected: 10/16/23 0812    Lab Status: Final result Specimen: Urine, Clean Catch Updated: 10/16/23 1612     Strep Pneumo Ag Negative    Legionella Antigen, Urine - Urine, Urine, Clean Catch [[id number]]  (Normal) Collected: 10/16/23 0812    Lab Status: Final result Specimen: Urine, Clean Catch Updated: 10/16/23 1612     LEGIONELLA ANTIGEN, URINE Negative            No radiology results for the last 7 days             Results for orders placed during the hospital encounter of 10/16/23    Adult Transthoracic Echo Complete w/ Color, Spectral and Contrast if necessary per protocol    Interpretation Summary    Left ventricular systolic function is normal. Calculated left ventricular EF = 58.2%    Left ventricular diastolic function was normal.    Estimated right ventricular systolic pressure from tricuspid regurgitation is mildly elevated (35-45 mmHg).      Labs Pending at Discharge:        Time spent on Discharge including face to face service:  ~40 minutes    Electronically signed by Benjy Davies MD, 10/25/23, 10:25 AM EDT.

## 2023-10-25 NOTE — PLAN OF CARE
Problem: Device-Related Complication Risk (Mechanical Ventilation, Invasive)  Goal: Optimal Device Function  Outcome: Met     Problem: Inability to Wean (Mechanical Ventilation, Invasive)  Goal: Mechanical Ventilation Liberation  Outcome: Met  Intervention: Promote Extubation and Mechanical Ventilation Liberation  Recent Flowsheet Documentation  Taken 10/25/2023 0730 by Ary White RN  Environmental Support: calm environment promoted     Problem: Nutrition Impairment (Mechanical Ventilation, Invasive)  Goal: Optimal Nutrition Delivery  Outcome: Met     Problem: Skin and Tissue Injury (Mechanical Ventilation, Invasive)  Goal: Absence of Device-Related Skin and Tissue Injury  Outcome: Met  Intervention: Maintain Skin and Tissue Health  Recent Flowsheet Documentation  Taken 10/25/2023 0730 by Ary White RN  Device Skin Pressure Protection: adhesive use limited     Problem: Ventilator-Induced Lung Injury (Mechanical Ventilation, Invasive)  Goal: Absence of Ventilator-Induced Lung Injury  Outcome: Met  Intervention: Prevent Ventilator-Associated Pneumonia  Recent Flowsheet Documentation  Taken 10/25/2023 0829 by Ary White RN  Head of Bed (HOB) Positioning: HOB elevated  Taken 10/25/2023 0730 by Ary White RN  Head of Bed (HOB) Positioning: HOB elevated  VAP Prevention Measures: completed     Problem: Adult Inpatient Plan of Care  Goal: Plan of Care Review  Outcome: Met  Flowsheets (Taken 10/25/2023 1144)  Plan of Care Reviewed With: patient     Problem: Skin Injury Risk Increased  Goal: Skin Health and Integrity  Outcome: Met  Intervention: Optimize Skin Protection  Recent Flowsheet Documentation  Taken 10/25/2023 0829 by Ary White RN  Head of Bed (HOB) Positioning: HOB elevated  Taken 10/25/2023 0730 by Ary White RN  Pressure Reduction Techniques: frequent weight shift encouraged  Head of Bed (HOB) Positioning: HOB elevated  Pressure Reduction Devices: positioning supports utilized  Skin Protection:  adhesive use limited     Problem: Fall Injury Risk  Goal: Absence of Fall and Fall-Related Injury  Outcome: Met  Intervention: Promote Injury-Free Environment  Recent Flowsheet Documentation  Taken 10/25/2023 1100 by Ary White RN  Safety Promotion/Fall Prevention: safety round/check completed  Taken 10/25/2023 0900 by Ary White RN  Safety Promotion/Fall Prevention: safety round/check completed  Taken 10/25/2023 0829 by Ary White RN  Safety Promotion/Fall Prevention:   nonskid shoes/slippers when out of bed   safety round/check completed  Taken 10/25/2023 0730 by Ary White RN  Safety Promotion/Fall Prevention: safety round/check completed     Problem: Restraint, Nonviolent  Goal: Absence of Harm or Injury  Outcome: Met  Intervention: Implement Least Restrictive Safety Strategies  Recent Flowsheet Documentation  Taken 10/25/2023 1100 by Ary White RN  Medical Device Protection: IV pole/bag removed from visual field  Taken 10/25/2023 0900 by Ary White RN  Medical Device Protection: IV pole/bag removed from visual field  Taken 10/25/2023 0730 by Ary White RN  Medical Device Protection:   IV pole/bag removed from visual field   tubing secured  Diversional Activities: television  Intervention: Protect Dignity, Rights, and Personal Wellbeing  Recent Flowsheet Documentation  Taken 10/25/2023 0730 by Ary White RN  Trust Relationship/Rapport:   care explained   choices provided   emotional support provided   empathic listening provided   questions answered   questions encouraged   reassurance provided   thoughts/feelings acknowledged  Intervention: Protect Skin and Joint Integrity  Recent Flowsheet Documentation  Taken 10/25/2023 0730 by Ary White RN  Body Position: position changed independently  Range of Motion: active ROM (range of motion) encouraged     Problem: Palliative Care  Goal: Enhanced Quality of Life  Outcome: Met  Intervention: Optimize Psychosocial Wellbeing  Recent Flowsheet  Documentation  Taken 10/25/2023 4051 by Ary White, RN  Supportive Measures: active listening utilized  Family/Support System Care:   support provided   self-care encouraged   Goal Outcome Evaluation:  Plan of Care Reviewed With: patient         Pt is alert and orient x4.  VS WNL for pt.  Pt to discharge home this shift.  Pain managed with prn pain medication.

## 2023-10-26 ENCOUNTER — READMISSION MANAGEMENT (OUTPATIENT)
Dept: CALL CENTER | Facility: HOSPITAL | Age: 83
End: 2023-10-26
Payer: MEDICARE

## 2023-10-26 NOTE — OUTREACH NOTE
Prep Survey      Flowsheet Row Responses   [facility]   Is LACE score < 7 ? No   Eligibility Readm Mgmt   Discharge diagnosis Severe sepsis with shock, MSSA pneumonia,Left upper lobe segmental and left lower lobe subsegmental acute PE   Does the patient have one of the following disease processes/diagnoses(primary or secondary)? Sepsis   Does the patient have Home health ordered? No   Is there a DME ordered? No   Prep survey completed? Yes            Mara BARAJAS - Registered Nurse

## 2023-10-31 ENCOUNTER — READMISSION MANAGEMENT (OUTPATIENT)
Dept: CALL CENTER | Facility: HOSPITAL | Age: 83
End: 2023-10-31
Payer: MEDICARE

## 2023-10-31 NOTE — OUTREACH NOTE
Sepsis Week 1 Survey      Flowsheet Row Responses   [facility] patient discharged from? [facility]   Does the patient have one of the following disease processes/diagnoses(primary or secondary)? Sepsis   Week 1 attempt successful? Yes   Call start time 1159   Call end time 1207   Discharge diagnosis Severe sepsis with shock, MSSA pneumonia,Left upper lobe segmental and left lower lobe subsegmental acute PE   Person spoke with today (if not patient) and relationship husbans   Medication alerts for this patient PCP added antibiotic,  reports.   Meds reviewed with patient/caregiver? Yes   Is the patient having any side effects they believe may be caused by any medication additions or changes? No   Does the patient have all medications related to this admission filled (includes all antibiotics, inhalers, nebulizers,steroids,etc.) Yes   Is the patient taking all medications as directed (includes completed medication regime)? Yes   Does the patient have a primary care provider?  Yes   Does the patient have an appointment with their PCP within 7 days of discharge? Yes   Has the patient kept scheduled appointments due by today? Yes   Comments At baseline pt is not confused, per . Pt is using IS at home,  reports. Per  reports the pt has improved since DC r/t SOA and activity.Pt does have some throat irritation and dry cough.   Did the patient receive a copy of their discharge instructions? Yes   Nursing interventions Reviewed instructions with patient   What is the patient's perception of their health status since discharge? Improving   Nursing interventions Nurse provided patient education   Is the patient/caregiver able to teach back TIME? T emperature - higher or lower than normal, I nfection - may have signs and symptoms of an infection, M ental Decline - confused, sleepy, difficult to arouse, E xtremely Ill - severe pain, discomfort, shortness of breath   Nursing interventions Nurse  provided reassurance to patient, Nurse provided patient education   Is patient/caregiver able to teach back steps to recovery at home? Set small, achievable goals for return to baseline health, Rest and regain strength   Is the patient/caregiver able to teach back signs and symptoms of worsening condition: Fever, Shortness of breath/rapid respiratory rate   If the patient is a current smoker, are they able to teach back resources for cessation? Not a smoker   Is the patient/caregiver able to teach back the hierarchy of who to call/visit for symptoms/problems? PCP, Specialist, Home health nurse, Urgent Care, ED, 911 Yes   Week 1 call completed? Yes   Revoked No further contact(revokes)-requires comment   Is the patient interested in additional calls from an ambulatory ? No   Graduated/Revoked comments Pt is not home, currently with friends.   Call end time 1207            Ting BECERRIL - Registered Nurse

## 2023-11-02 ENCOUNTER — OFFICE VISIT (OUTPATIENT)
Dept: ORTHOPEDIC SURGERY | Facility: CLINIC | Age: 83
End: 2023-11-02
Payer: MEDICARE

## 2023-11-02 VITALS
WEIGHT: 204 LBS | HEIGHT: 64 IN | SYSTOLIC BLOOD PRESSURE: 112 MMHG | BODY MASS INDEX: 34.83 KG/M2 | HEART RATE: 93 BPM | DIASTOLIC BLOOD PRESSURE: 75 MMHG | OXYGEN SATURATION: 91 %

## 2023-11-02 DIAGNOSIS — M19.011 OSTEOARTHRITIS OF RIGHT SHOULDER, UNSPECIFIED OSTEOARTHRITIS TYPE: ICD-10-CM

## 2023-11-02 DIAGNOSIS — M25.511 RIGHT SHOULDER PAIN, UNSPECIFIED CHRONICITY: Primary | ICD-10-CM

## 2023-11-02 RX ORDER — DOXYCYCLINE HYCLATE 100 MG
TABLET ORAL
COMMUNITY
Start: 2023-10-31

## 2023-11-02 RX ORDER — DEXTROMETHORPHAN HYDROBROMIDE AND PROMETHAZINE HYDROCHLORIDE 15; 6.25 MG/5ML; MG/5ML
SYRUP ORAL
COMMUNITY
Start: 2023-10-30

## 2023-11-02 RX ADMIN — TRIAMCINOLONE ACETONIDE 40 MG: 40 INJECTION, SUSPENSION INTRA-ARTICULAR; INTRAMUSCULAR at 09:24

## 2023-11-02 RX ADMIN — LIDOCAINE HYDROCHLORIDE 5 ML: 10 INJECTION, SOLUTION INFILTRATION; PERINEURAL at 09:24

## 2023-11-02 NOTE — PROGRESS NOTES
"Chief Complaint  Follow-up and Pain of the Right Shoulder     Subjective      Chula Adkins presents to [facility] for follow up of the right shoulder. She had a right shoulder steroid injection 7/13/23.  She has treated the arthritis conservatively for years.  She has pain and pain while sleeping.  She plays cards and sometimes can barely lift her shoulder. She does not want surgical intervention at this time.  She has difficulty with forward and upward reaching tasks.     No Known Allergies     Social History     Socioeconomic History    Marital status:    Tobacco Use    Smoking status: Never    Smokeless tobacco: Never   Vaping Use    Vaping Use: Never used   Substance and Sexual Activity    Alcohol use: Never    Drug use: Never    Sexual activity: Not Currently        I reviewed the patient's chief complaint, history of present illness, review of systems, past medical history, surgical history, family history, social history, medications, and allergy list.     Review of Systems     Constitutional: Denies fevers, chills, weight loss  Cardiovascular: Denies chest pain, shortness of breath  Skin: Denies rashes, acute skin changes  Neurologic: Denies headache, loss of consciousness        Vital Signs:   /75   Pulse 93   Ht 162.6 cm (64\")   Wt 92.5 kg (204 lb)   SpO2 91%   BMI 35.02 kg/m²          Physical Exam  General: Alert. No acute distress    Ortho Exam        RIGHT SHOULDER Forward flexion 80. Abduction 70. External rotation 40. Internal rotation SI joint. Sensation intact to light touch, median, radial, ulnar nerve. Positive AIN, PIN, ulnar nerve motor. Positive pulses. Good strength in triceps, biceps, deltoid, wrist extensors and wrist flexors.        Large Joint RIGHT SHOULDER  Date/Time: 11/2/2023 9:24 AM  Consent given by: patient  Site marked: site marked  Timeout: Immediately prior to procedure a time out was called to verify the correct patient, " procedure, equipment, support staff and site/side marked as required   Supporting Documentation  Indications: pain   Procedure Details  Location: shoulder -   Preparation: Patient was prepped and draped in the usual sterile fashion  Needle gauge: 21G.  Medications administered: 5 mL lidocaine 1 %; 40 mg triamcinolone acetonide 40 MG/ML  Patient tolerance: patient tolerated the procedure well with no immediate complications        Imaging Results (Most Recent)       None             Result Review :              Assessment and Plan     Diagnoses and all orders for this visit:    1. Right shoulder pain, unspecified chronicity (Primary)    2. Osteoarthritis of right shoulder, unspecified osteoarthritis type        Discussed the treatment plan with the patient.     Discussed the risks and benefits of conservative measures.  The patient expressed understanding and wished to proceed with a right shoulder steroid injection. She tolerated the injection well.     Call or return if worsening symptoms.    Follow Up     PRN      Patient was given instructions and counseling regarding her condition or for health maintenance advice. Please see specific information pulled into the AVS if appropriate.     Scribed for Jose Richter MD by Ana Flowers MA.  11/02/23   08:56 EDT    I have personally performed the services described in this document as scribed by the above individual and it is both accurate and complete. Jose Richter MD 11/03/23

## 2023-11-03 RX ORDER — TRIAMCINOLONE ACETONIDE 40 MG/ML
40 INJECTION, SUSPENSION INTRA-ARTICULAR; INTRAMUSCULAR
Status: COMPLETED | OUTPATIENT
Start: 2023-11-02 | End: 2023-11-02

## 2023-11-03 RX ORDER — LIDOCAINE HYDROCHLORIDE 10 MG/ML
5 INJECTION, SOLUTION INFILTRATION; PERINEURAL
Status: COMPLETED | OUTPATIENT
Start: 2023-11-02 | End: 2023-11-02

## 2023-11-14 ENCOUNTER — OFFICE VISIT (OUTPATIENT)
Dept: OTOLARYNGOLOGY | Facility: CLINIC | Age: 83
End: 2023-11-14
Payer: MEDICARE

## 2023-11-14 DIAGNOSIS — J32.0 CHRONIC MAXILLARY SINUSITIS: Primary | ICD-10-CM

## 2023-11-14 PROCEDURE — 31231 NASAL ENDOSCOPY DX: CPT | Performed by: OTOLARYNGOLOGY

## 2023-11-14 PROCEDURE — 1160F RVW MEDS BY RX/DR IN RCRD: CPT | Performed by: OTOLARYNGOLOGY

## 2023-11-14 PROCEDURE — 99204 OFFICE O/P NEW MOD 45 MIN: CPT | Performed by: OTOLARYNGOLOGY

## 2023-11-14 PROCEDURE — 1159F MED LIST DOCD IN RCRD: CPT | Performed by: OTOLARYNGOLOGY

## 2023-11-14 NOTE — PROGRESS NOTES
Patient Name: Chula Adkins   Visit Date: 11/14/2023   Patient ID: [id number]  Provider: Lasha Mota MD    Sex: female  Location: [facility]   YOB: 1940  Location Address: [address] [place] [address], [address] [place] [address],?[place]?[id number]    Primary Care Provider Owen Mathias MD  Location Phone: [phone]    Referring Provider: No ref. provider found        Chief Complaint  Follow-up (Abnormal MRI Maxillary polyp)    Subjective    History of Present Illness  Chula Adkins is a 83 y.o. female who presents to [facility] today as a consult from No ref. provider found.    She presents to the clinic today for evaluation of chronic left maxillary sinusitis that was initially noted during her hospitalization when an MRI was obtained.  She denies any major nasal or sinus symptoms and denies any facial pain or purulent drainage.  I reviewed the MRI personally along with her and this reveals left maxillary antrum and left nasal cavity involvement with inflammatory process that appears infectious.  Radiologist noted the possibility of an antrochoanal polyp versus a fungal ball of the left maxillary sinus.  She has noted chronic drainage on that side.  Denies any nasal obstruction or headache issues.  She informs me that she was hospitalized due to her being found unresponsive, but they are not sure if this is related to the MRI findings.    Past Medical History:   Diagnosis Date    CHF (congestive heart failure)     NO RECENT ISSUES    Constipation     Fibromyalgia     Hemorrhoids     Hyperlipidemia     Hypertension     Pain in back     SPONDYLOSIS, LUMBAR, PAIN MGMT CLINIC    Pneumonia     8/25/22, RESOLVING, SOA W/EX. REMAINS DENIES COUGH OR PAIN    Rectal prolapse     Restless leg syndrome     Salivary stones        Past Surgical History:   Procedure Laterality Date    BLADDER REPAIR      COLONOSCOPY      ELBOW PROCEDURE Left     MRSA  WOUND, COMPLETLY RESOLVED    EXCISION LESION Right     MAXILARY GLAND X2    FOOT SURGERY Bilateral     TARSAL TUNNEL SURGERY    GALLBLADDER SURGERY      HYSTERECTOMY      RECTAL PROLAPSE REPAIR      2019 IN [place]    RECTOPEXY N/A 10/14/2022    Procedure: ROBOT ASSISTED  RECTOPEXY;  Surgeon: Navjot Diaz MD;  Location: [facility];  Service: Robotics - [facility];  Laterality: N/A;    SHOULDER SURGERY Right     RCR X2         Current Outpatient Medications:     albuterol sulfate  (90 Base) MCG/ACT inhaler, Inhale 2 puffs Every 6 (Six) Hours As Needed for Shortness of Air., Disp: 18 g, Rfl: 0    amitriptyline (ELAVIL) 50 MG tablet, Take 1 tablet by mouth Every Night., Disp: , Rfl:     apixaban (ELIQUIS) 5 MG tablet tablet, Take 2 tablets by mouth 2 (Two) Times a Day for 4 days, THEN 1 tablet 2 (Two) Times a Day for 30 days. Indications: DVT/PE (active thrombosis), Disp: 76 tablet, Rfl: 0    Apoaequorin (PREVAGEN PO), Take 1 tablet by mouth Daily., Disp: , Rfl:     bumetanide (BUMEX) 1 MG tablet, , Disp: , Rfl:     buPROPion XL (WELLBUTRIN XL) 300 MG 24 hr tablet, Take 1 tablet by mouth Every Morning., Disp: , Rfl:     carvedilol (COREG) 3.125 MG tablet, Take 1 tablet by mouth 2 (Two) Times a Day With Meals. INST PER ANESTHESIA PROTOCOL, Disp: , Rfl:     doxycycline (VIBRAMYICN) 100 MG tablet, , Disp: , Rfl:     midodrine (PROAMATINE) 5 MG tablet, Take 1 tablet by mouth 2 (Two) Times a Day Before Meals for 30 days., Disp: 60 tablet, Rfl: 0    MULTIPLE VITAMINS-MINERALS PO, Take  by mouth. LAST DOSE 9/15/22, Disp: , Rfl:     Omega-3 Fatty Acids (fish oil) 1000 MG capsule capsule, Take 1 capsule by mouth Daily With Breakfast., Disp: , Rfl:     oxyCODONE-acetaminophen (PERCOCET)  MG per tablet, Take 1 tablet by mouth Every 12 (Twelve) Hours. RX'D BY PAIN CLINIC, Disp: , Rfl:     Plecanatide (Trulance) 3 MG tablet, Take 1 tablet by mouth Every Night., Disp: , Rfl:     polyethylene glycol (MIRALAX)  17 g packet, Take 17 g by mouth Daily., Disp: 10 packet, Rfl: 0    promethazine-dextromethorphan (PROMETHAZINE-DM) 6.25-15 MG/5ML syrup, , Disp: , Rfl:     rOPINIRole (REQUIP) 3 MG tablet, Take 1 tablet by mouth Every Night., Disp: , Rfl:     sertraline (ZOLOFT) 50 MG tablet, Take 1 tablet by mouth Daily., Disp: , Rfl:     simvastatin (ZOCOR) 40 MG tablet, Take 1 tablet by mouth Every Night., Disp: , Rfl:     Synthroid 25 MCG tablet, Take 1 tablet by mouth Daily., Disp: , Rfl:     Trulance 3 MG tablet, Take 1 tablet by mouth Daily., Disp: , Rfl:     buPROPion XL (WELLBUTRIN XL) 300 MG 24 hr tablet, Take 1 tablet by mouth Every Morning. (Patient not taking: Reported on 11/2/2023), Disp: , Rfl:     carvedilol (COREG) 3.125 MG tablet, Take 1 tablet by mouth 2 (Two) Times a Day With Meals. (Patient not taking: Reported on 11/14/2023), Disp: , Rfl:     Magnesium 500 MG capsule, Take 1 capsule by mouth Daily. LAST DOSE 9/15/22 (Patient not taking: Reported on 11/14/2023), Disp: , Rfl:     multivitamin with minerals tablet tablet, Take 1 tablet by mouth Daily. (Patient not taking: Reported on 11/14/2023), Disp: , Rfl:     Omega-3 Fatty Acids (fish oil) 1000 MG capsule capsule, Take 1 capsule by mouth Daily With Breakfast. LAST DOSE 9/15/22 (Patient not taking: Reported on 11/2/2023), Disp: , Rfl:     oxyCODONE-acetaminophen (PERCOCET)  MG per tablet, Take 1 tablet by mouth Every 12 (Twelve) Hours As Needed for Moderate Pain. (Patient not taking: Reported on 11/14/2023), Disp: , Rfl:     oxyCODONE-acetaminophen (Percocet) 5-325 MG per tablet, Take 1 tablet by mouth Every 6 (Six) Hours As Needed for Moderate Pain. (Patient not taking: Reported on 11/14/2023), Disp: 12 tablet, Rfl: 0    Potassium 99 MG tablet, Take 1 tablet by mouth Daily. (Patient not taking: Reported on 11/14/2023), Disp: , Rfl:     rOPINIRole (REQUIP) 3 MG tablet, Take 1 tablet by mouth Every Night. (Patient not taking: Reported on 11/14/2023), Disp:  , Rfl:     sertraline (ZOLOFT) 50 MG tablet, Take 1 tablet by mouth Daily. (Patient not taking: Reported on 11/2/2023), Disp: , Rfl:     simvastatin (ZOCOR) 40 MG tablet, Take 1 tablet by mouth Every Night. (Patient not taking: Reported on 11/14/2023), Disp: , Rfl:     Synthroid 25 MCG tablet, Take 1 tablet by mouth Every Morning. (Patient not taking: Reported on 11/2/2023), Disp: , Rfl:      No Known Allergies    Family History   Problem Relation Age of Onset    Stroke Sister     Hypertension Neg Hx     Diabetes Neg Hx     Cancer Neg Hx     Malig Hyperthermia Neg Hx         Social History     Social History Narrative    ** Merged History Encounter **            Objective     Vital Signs:   There were no vitals taken for this visit.      Physical Exam    Constitutional   Appearance  : well developed, well-nourished, alert and in no acute distress, voice clear and strong    Head  Inspection  : no deformities or lesions  Face  Inspection  : No facial lesions; House-Brackmann I/VI bilaterally  Palpation  : No TMJ crepitus nor  muscle tenderness bilaterally    Eyes  Vision  Visual Fields  : Extraocular movements are intact. No spontaneous or gaze-induced nystagmus.  Conjunctivae  : clear  Sclerae  : clear  Pupils and Irises  : pupils equal, round, and reactive to light.     Ears, Nose, Mouth and Throat    Ears    External Ears  : appearance within normal limits, no lesions present  Otoscopic Examination  : Tympanic membrane appearance within normal limits bilaterally without perforations, well-aerated middle ears  Hearing  : intact to conversational voice both ears  Tunning fork testing:     :    Nose    External Nose  : appearance normal  Intranasal Exam  : mucosa inflamed with purulent drainage on the left running down the oropharynx, vestibules normal, no intranasal lesions present, septum midline, sinuses non tender to percussion  Oral Cavity    Oral Mucosa  : oral mucosa normal without pallor or  cyanosis  Lips  : lip appearance normal  Teeth  : normal dentition for age  Gums  : gums pink, non-swollen, no bleeding present  Tongue  : tongue appearance normal; normal mobility  Palate  : hard palate normal, soft palate appearance normal with symmetric mobility    Throat    Oropharynx  : no inflammation or lesions present, tonsils within normal limits  Hypopharynx  : appearance within normal limits, superior epiglottis within normal limits  Larynx  : appearance within normal limits, vocal cords within normal limits, no lesions present    Neck  Inspection/Palpation  : normal appearance, no masses or tenderness, trachea midline; thyroid size normal, nontender, no nodules or masses present on palpation    Respiratory  Respiratory Effort  : breathing unlabored  Inspection of Chest  : normal appearance, no retractions    Cardiovascular  Heart  : regular rate and rhythm    Lymphatic  Neck  : no lymphadenopathy present  Supraclavicular Nodes  : no lymphadenopathy present  Preauricular Nodes  : no lymphadenopathy present    Skin and Subcutaneous Tissue  General Inspection  : Regarding face and neck - there are no rashes present, no lesions present, and no areas of discoloration    Neurologic  Cranial Nerves  : cranial nerves II-XII are grossly intact bilaterally  Gait and Station  : normal gait, able to stand without diffculty    Psychiatric  Judgement and Insight  : judgment and insight intact  Mood and Affect  : mood normal, affect appropriate     Nasal endoscopy    Date/Time: 11/14/2023 11:56 AM    Performed by: Lasha Mota MD  Authorized by: Lasha Mota MD    Procedure details:     Indications: sino-nasal symptoms      Medication:  Afrin    Instrument: flexible nasal nasal endoscope      Scope location: left nare    Comments:      Purulent drainage from the OMC on the left side.  No evidence of polyps or lesions.          Assessment and Plan    Diagnoses and all orders for this visit:    1. Chronic  maxillary sinusitis (Primary)    Examination today revealed purulence in the left nasal cavity running down the oropharynx.  Nasal endoscopy was performed and confirmed drainage out of the OMC that appears purulent.  She is currently still on antibiotics for this.  She is hesitant to do anything as she has not had any nasal symptoms.  This may be a chronic process.  I discussed the MRI findings and clinical exam results and did recommend finishing out the course of antibiotics and following up in the clinic afterwards.  She informing that she is going to [place] until April and we discussed follow-up if there is any worsening sooner wherever she may be.  If she is doing well she may require endoscopic sinus surgery, especially if there is obstruction of the OMC or mycetoma.  I discussed this with her today and have scheduled her follow-up in April when she gets back from [place].  I will be glad to see her sooner if need be.    Follow Up   No follow-ups on file.  Patient was given instructions and counseling regarding her condition or for health maintenance advice. Please see specific information pulled into the AVS if appropriate.

## 2023-11-27 ENCOUNTER — TRANSCRIBE ORDERS (OUTPATIENT)
Dept: GENERAL RADIOLOGY | Facility: HOSPITAL | Age: 83
End: 2023-11-27
Payer: MEDICARE

## 2023-11-27 ENCOUNTER — HOSPITAL ENCOUNTER (OUTPATIENT)
Dept: GENERAL RADIOLOGY | Facility: HOSPITAL | Age: 83
Discharge: HOME | End: 2023-11-27
Admitting: INTERNAL MEDICINE
Payer: MEDICARE

## 2023-11-27 DIAGNOSIS — R06.00 DYSPNEA, UNSPECIFIED TYPE: ICD-10-CM

## 2023-11-27 DIAGNOSIS — R05.9 COUGH, UNSPECIFIED TYPE: Primary | ICD-10-CM

## 2023-11-27 DIAGNOSIS — R05.9 COUGH, UNSPECIFIED TYPE: ICD-10-CM

## 2023-11-27 PROCEDURE — 71046 X-RAY EXAM CHEST 2 VIEWS: CPT

## 2024-03-01 ENCOUNTER — HOSPITAL ENCOUNTER (OUTPATIENT)
Dept: GENERAL RADIOLOGY | Facility: HOSPITAL | Age: 84
Discharge: HOME | End: 2024-03-01
Payer: MEDICARE

## 2024-03-01 ENCOUNTER — PRE-ADMISSION TESTING (OUTPATIENT)
Dept: PREADMISSION TESTING | Facility: HOSPITAL | Age: 84
End: 2024-03-01
Payer: MEDICARE

## 2024-03-01 VITALS
OXYGEN SATURATION: 98 % | HEIGHT: 62 IN | SYSTOLIC BLOOD PRESSURE: 114 MMHG | WEIGHT: 196 LBS | BODY MASS INDEX: 36.07 KG/M2 | RESPIRATION RATE: 20 BRPM | DIASTOLIC BLOOD PRESSURE: 79 MMHG | HEART RATE: 100 BPM | TEMPERATURE: 98.2 F

## 2024-03-01 LAB
ALBUMIN SERPL-MCNC: 4.7 G/DL (ref 3.5–5.2)
ALBUMIN/GLOB SERPL: 2 G/DL
ALP SERPL-CCNC: 79 U/L (ref 39–117)
ALT SERPL W P-5'-P-CCNC: 20 U/L (ref 1–33)
ANION GAP SERPL CALCULATED.3IONS-SCNC: 13 MMOL/L (ref 5–15)
AST SERPL-CCNC: 23 U/L (ref 1–32)
BILIRUB SERPL-MCNC: 1.1 MG/DL (ref 0–1.2)
BILIRUB UR QL STRIP: NEGATIVE
BUN SERPL-MCNC: 45 MG/DL (ref 8–23)
BUN/CREAT SERPL: 37.5 (ref 7–25)
CALCIUM SPEC-SCNC: 10.1 MG/DL (ref 8.6–10.5)
CHLORIDE SERPL-SCNC: 101 MMOL/L (ref 98–107)
CLARITY UR: CLEAR
CO2 SERPL-SCNC: 27 MMOL/L (ref 22–29)
COLOR UR: YELLOW
CREAT SERPL-MCNC: 1.2 MG/DL (ref 0.57–1)
DEPRECATED RDW RBC AUTO: 39.2 FL (ref 37–54)
EGFRCR SERPLBLD CKD-EPI 2021: 45.6 ML/MIN/1.73
ERYTHROCYTE [DISTWIDTH] IN BLOOD BY AUTOMATED COUNT: 12 % (ref 12.3–15.4)
GLOBULIN UR ELPH-MCNC: 2.4 GM/DL
GLUCOSE SERPL-MCNC: 94 MG/DL (ref 65–99)
GLUCOSE UR STRIP-MCNC: NEGATIVE MG/DL
HCT VFR BLD AUTO: 38.4 % (ref 34–46.6)
HGB BLD-MCNC: 12.7 G/DL (ref 12–15.9)
HGB UR QL STRIP.AUTO: NEGATIVE
KETONES UR QL STRIP: NEGATIVE
LEUKOCYTE ESTERASE UR QL STRIP.AUTO: NEGATIVE
MCH RBC QN AUTO: 29.7 PG (ref 26.6–33)
MCHC RBC AUTO-ENTMCNC: 33.1 G/DL (ref 31.5–35.7)
MCV RBC AUTO: 89.9 FL (ref 79–97)
NITRITE UR QL STRIP: NEGATIVE
PH UR STRIP.AUTO: 6.5 [PH] (ref 5–8)
PLATELET # BLD AUTO: 289 10*3/MM3 (ref 140–450)
PMV BLD AUTO: 10.3 FL (ref 6–12)
POTASSIUM SERPL-SCNC: 4.6 MMOL/L (ref 3.5–5.2)
PROT SERPL-MCNC: 7.1 G/DL (ref 6–8.5)
PROT UR QL STRIP: NEGATIVE
RBC # BLD AUTO: 4.27 10*6/MM3 (ref 3.77–5.28)
SODIUM SERPL-SCNC: 141 MMOL/L (ref 136–145)
SP GR UR STRIP: 1.01 (ref 1–1.03)
UROBILINOGEN UR QL STRIP: NORMAL
WBC NRBC COR # BLD AUTO: 5.89 10*3/MM3 (ref 3.4–10.8)

## 2024-03-01 PROCEDURE — 80053 COMPREHEN METABOLIC PANEL: CPT

## 2024-03-01 PROCEDURE — 71046 X-RAY EXAM CHEST 2 VIEWS: CPT

## 2024-03-01 PROCEDURE — 81003 URINALYSIS AUTO W/O SCOPE: CPT

## 2024-03-01 PROCEDURE — 73030 X-RAY EXAM OF SHOULDER: CPT

## 2024-03-01 PROCEDURE — 36415 COLL VENOUS BLD VENIPUNCTURE: CPT

## 2024-03-01 PROCEDURE — 85027 COMPLETE CBC AUTOMATED: CPT

## 2024-03-01 PROCEDURE — 93010 ELECTROCARDIOGRAM REPORT: CPT | Performed by: INTERNAL MEDICINE

## 2024-03-01 PROCEDURE — 93005 ELECTROCARDIOGRAM TRACING: CPT

## 2024-03-01 RX ORDER — LEVOTHYROXINE SODIUM 0.03 MG/1
25 TABLET ORAL
COMMUNITY

## 2024-03-01 NOTE — DISCHARGE INSTRUCTIONS
Take the following medications the morning of surgery:  BUPROPRION,CARVEDILOL AND LEVOTHYROXINE        YOU WILL BE NOTIFIED OF ARRIVAL TIME    If you are on prescription narcotic pain medication to control your pain you may also take that medication the morning of surgery.    General Instructions:  Do not eat solid food after midnight the night before surgery.  You may drink clear liquids day of surgery but must stop at least one hour before your hospital arrival time.  It is beneficial for you to have a clear drink that contains carbohydrates the day of surgery.  We suggest a 12 to 20 ounce bottle of Gatorade or Powerade for non-diabetic patients or a 12 to 20 ounce bottle of G2 or Powerade Zero for diabetic patients. (Pediatric patients, are not advised to drink a 12 to 20 ounce carbohydrate drink)    Clear liquids are liquids you can see through.  Nothing red in color.     Plain water                               Sports drinks  Sodas                                   Gelatin (Jell-O)  Fruit juices without pulp such as white grape juice and apple juice  Popsicles that contain no fruit or yogurt  Tea or coffee (no cream or milk added)  Gatorade / Powerade  G2 / Powerade Zero    Infants may have breast milk up to four hours before surgery.  Infants drinking formula may drink formula up to six hours before surgery.   Patients who avoid smoking, chewing tobacco and alcohol for 4 weeks prior to surgery have a reduced risk of post-operative complications.  Quit smoking as many days before surgery as you can.  Do not smoke, use chewing tobacco or drink alcohol the day of surgery.   If applicable bring your C-PAP/ BI-PAP machine in with you to preop day of surgery.  Bring any papers given to you in the doctor’s office.  Wear clean comfortable clothes.  Do not wear contact lenses, false eyelashes or make-up.  Bring a case for your glasses.   Bring crutches or walker if applicable.  Remove all piercings.  Leave jewelry and  any other valuables at home.  Hair extensions with metal clips must be removed prior to surgery.  The Pre-Admission Testing nurse will instruct you to bring medications if unable to obtain an accurate list in Pre-Admission Testing.        If you were given a blood bank ID arm band remember to bring it with you the day of surgery.    Day of surgery:  Your arrival time is approximately two hours before your scheduled surgery time.  Upon arrival, a Pre-op nurse and Anesthesiologist will review your health history, obtain vital signs, and answer questions you may have.  The only belongings needed at this time will be a list of your home medications and if applicable your C-PAP/BI-PAP machine.  A Pre-op nurse will start an IV and you may receive medication in preparation for surgery, including something to help you relax.     Please be aware that surgery does come with discomfort.  We want to make every effort to control your discomfort so please discuss any uncontrolled symptoms with your nurse.   Your doctor will most likely have prescribed pain medications.      If you are going home after surgery you will receive individualized written care instructions before being discharged.  A responsible adult must drive you to and from the hospital on the day of your surgery and ideally stay with you through the night.  Discharge prescriptions can be filled by the hospital pharmacy during regular pharmacy hours.  If you are having surgery late in the day/evening your prescription may be e-prescribed to your pharmacy.  Please verify your pharmacy hours or chose a 24 hour pharmacy to avoid not having access to your prescription because your pharmacy has closed for the day.    If you are staying overnight following surgery, you will be transported to your hospital room following the recovery period.  [facility] has all private rooms.    If you have any questions please call Pre-Admission Testing at  [phone].  Deductibles and co-payments are collected on the day of service. Please be prepared to pay the required co-pay, deductible or deposit on the day of service as defined by your plan.    Call your surgeon immediately if you experience any of the following symptoms:  Sore Throat  Shortness of Breath or difficulty breathing  Cough  Chills  Body soreness or muscle pain  Headache  Fever  New loss of taste or smell  Do not arrive for your surgery ill.  Your procedure will need to be rescheduled to another time.  You will need to call your physician before the day of surgery to avoid any unnecessary exposure to hospital staff as well as other patients.        PREVENTING INFECTION IN SHOULDER SURGICAL SITES     C. acnes is a bacteria that lives deep within follicles and pores of the skin. It is found in large numbers on the skin of the face, axilla (armpit), chest and back and is the primary bacteria to cause a surgical site infection after shoulder surgery.      Use of a Benzoyl Peroxide solution prior to shoulder surgery decreases C. acnes and reduces post-op infections.   Your surgeon has ordered 5% Benzoyl Peroxide wash to be used three times prior to your surgery.     Please read the following instructions carefully and bring this form with you the day of surgery.     General bathing instructions starting two days before your surgery:    Shower using a fresh bar of anti-bacterial soap (such as Dial) and clean washcloth.  Pay special attention to the neck, shoulder and armpit area.   Wash your hair as usual with your regular shampoo.   Rinse hair and body thoroughly with warm water (not hot water) to remove shampoo and residue.   Dry with a clean towel.              Sleep in a clean bed with clean clothing.  Do not allow pets to sleep with you.     For 2 days before surgery, avoid shaving with a razor because the razor can irritate skin and make it easier to develop an infection.    Any areas of open skin  can increase the risk of a post-operative wound infection by allowing bacteria to enter and travel throughout the body.  Notify your surgeon if you have any skin wounds / rashes even if it is not near the expected surgical site.  The area will need assessed to determine if surgery should be delayed until it is healed.      First application of 5% Benzoyl Peroxide Wash two nights before surgery:                                                                Wash neck, shoulder (front, back, side) and armpit   with warm water, rinse and dry - see picture.  Gently wash the same areas with the Benzoyl Peroxide   cleanser going away from the neck for 10-20 seconds.   Work into a full lather and leave on the skin for   2 minutes for greatest effect.  Rinse thoroughly with warm water, not hot water.                     Pat dry with a clean towel.                                                            Wash your hands thoroughly.  Do not apply lotion, powder, perfume or deodorant.   Put on clean clothes.    Second application the night before surgery:  Repeat the above steps.        Third application morning of surgery:  Repeat the above steps.    Due to shoulder pain or decreased range of motion of your shoulder and arm, you may need assistance washing under the arm or the back portion of your shoulder.     Avoid further washing the areas of the skin treated with Benzoyl Peroxide for at least 1 hour.    For your convenience, you may purchase Benzoyl Peroxide at [facility] retail pharmacy.     Warning:  Let your physician know if you are allergic to Benzoyl Peroxide or have very sensitive skin and cannot use it.   Stop using and contact your surgeon if you experience any excessive scaling, itching, swelling, skin irritation or other signs of a reaction.  Keep out of eyes, ears, nose and mouth.  Do not apply to sunburned, irritated or broken area on the skin.  Avoid unwanted problems with bleaching effect by following  these tips:  Wash hands after each use.  Avoid contact with hair, clothing, furnishings or carpeting.  Wear clean, old t-shirt or clothing to bed.   Use clean, old white pillow cases and sheets to avoid discoloring your bed linens.                                                                                                                                                                                                                                                                                   Please complete the checklist below, bring it with you to the hospital                               the day of surgery and give to the Pre-op Nurse     Preoperative Skin Prep Checklist        Patient Name Label             Enter dates and ?  boxes to indicate completed    Surgery Date: _______________ Regular Shower  Benzoyl Peroxide Wash   First Application:  2 days before_______________  (Stop shaving all body parts)           Morning or Evening                        Evening    Second Application:  1 day  before________________        Morning or Evening                          Evening   Third Application:  Day of Surgery______________                           Morning                   Morning

## 2024-03-02 LAB
QT INTERVAL: 379 MS
QTC INTERVAL: 449 MS

## 2024-03-05 NOTE — H&P
HPI  Chula is a  female who is here today with a longstanding history of right shoulder pain which is worsened in the past few weeks. She is right-hand dominant and retired. She does have a history of 2 previous rotator cuff repairs in [place]. She has been treated most recently by a physician in [place] and received her last cortisone injection in November. Unfortunately this did not give her a great deal of pain relief. She describes the current symptoms as soreness, sharp pain, locking, weakness, aching, trouble sleeping on the shoulder and decreased range of motion. She also has popping and grinding in the shoulder. She states pain at rest is a 7 out of 10 pain with activity is a 10 out of 10. She tried Aleve, Tylenol, Percocet, ice, heat, topical creams and activity modification. She is here today to seek a second opinion in regards to treatment options for her shoulder.  ROS  Patient reports arthralgias/joint pain; right shoulder.  ROS as [facility] in the [facility]  Physical Exam  The patient's general appearance is well developed, well nourished, no acute distress. Orientation is alert and oriented x 3. The patient's mood is normal. A head exam reveals normocephalic/atraumatic. An eye exam reveals pupils equal. Pulmonary exam shows normal air exchange, no labored breathing, or shortness of breath.  Skin is normal. There is no warmth. No erythema. Lymphadenopathy is negative. Right shoulder passive ROM today shows: Elevation: 120 ER(side): 20 IR(abd): 20 IR(vert): pocket. Abduction: 80. Shoulder strength: Elevation: 3/5 ER: 3/5 IR: 4-/5 ABD: 3/5. Crepitation in the GH. There is tenderness in the. Arc of motion is positive. Pulses are normal. Normal sensation. Capillary refill is normal. Tender over the anterior and posterior glenohumeral joint to palpation.  Assessment / Plan  MRI of the right shoulder from [facility] imaging dated 6/10/2023 shows moderate to advanced glenohumeral joint  arthropathy. Extensive rotator cuff tendinosis. Circumferential labral degeneration. Cystic appearing collection in the deltoid favored to represent a chronic seroma.    1. Pain of right shoulder joint  M25.511: Pain in right shoulder  XR, SHOULDER, 2 OR MORE VIEW  Side: RIGHT    2. Rotator cuff arthropathy of right shoulder  M25.811: Other specified joint disorders, right shoulder    XR, SHOULDER, 2 OR MORE VIEW  Side: RIGHT  Result:  - XRAY SHOULDER 2+ VIEW: Performed  Result Note: 3 view right shoulder shows no acute fractures no dislocation good osseous quality. Bone-on-bone arthritic change noted in the glenohumeral joint with inferior spurring. Acromial spurring.  Patient Instructions  We discussed both operative and nonoperative options. We to repeat the injection. She is headed to [place] in late October and does not wish to delay this departure date. We discussed a reverse total shoulder arthroplasty.

## 2024-03-07 ENCOUNTER — HOSPITAL ENCOUNTER (OUTPATIENT)
Facility: HOSPITAL | Age: 84
Discharge: HOME | End: 2024-03-08
Attending: ORTHOPAEDIC SURGERY | Admitting: ORTHOPAEDIC SURGERY
Payer: MEDICARE

## 2024-03-07 ENCOUNTER — APPOINTMENT (OUTPATIENT)
Dept: GENERAL RADIOLOGY | Facility: HOSPITAL | Age: 84
End: 2024-03-07
Payer: MEDICARE

## 2024-03-07 ENCOUNTER — ANESTHESIA EVENT (OUTPATIENT)
Dept: PERIOP | Facility: HOSPITAL | Age: 84
End: 2024-03-07
Payer: MEDICARE

## 2024-03-07 ENCOUNTER — ANESTHESIA (OUTPATIENT)
Dept: PERIOP | Facility: HOSPITAL | Age: 84
End: 2024-03-07
Payer: MEDICARE

## 2024-03-07 DIAGNOSIS — Z96.611 STATUS POST REVERSE ARTHROPLASTY OF SHOULDER, RIGHT: Primary | ICD-10-CM

## 2024-03-07 PROCEDURE — 25810000003 LACTATED RINGERS PER 1000 ML: Performed by: ANESTHESIOLOGY

## 2024-03-07 PROCEDURE — A9270 NON-COVERED ITEM OR SERVICE: HCPCS | Performed by: ORTHOPAEDIC SURGERY

## 2024-03-07 PROCEDURE — C1776 JOINT DEVICE (IMPLANTABLE): HCPCS | Performed by: ORTHOPAEDIC SURGERY

## 2024-03-07 PROCEDURE — C1713 ANCHOR/SCREW BN/BN,TIS/BN: HCPCS | Performed by: ORTHOPAEDIC SURGERY

## 2024-03-07 PROCEDURE — A9270 NON-COVERED ITEM OR SERVICE: HCPCS | Performed by: STUDENT IN AN ORGANIZED HEALTH CARE EDUCATION/TRAINING PROGRAM

## 2024-03-07 PROCEDURE — G0378 HOSPITAL OBSERVATION PER HR: HCPCS

## 2024-03-07 PROCEDURE — 25010000002 ONDANSETRON PER 1 MG

## 2024-03-07 PROCEDURE — 0 BUPIVACAINE LIPOSOME 1.3 % SUSPENSION: Performed by: STUDENT IN AN ORGANIZED HEALTH CARE EDUCATION/TRAINING PROGRAM

## 2024-03-07 PROCEDURE — 25010000002 BUPIVACAINE (PF) 0.5 % SOLUTION: Performed by: STUDENT IN AN ORGANIZED HEALTH CARE EDUCATION/TRAINING PROGRAM

## 2024-03-07 PROCEDURE — 25010000002 CEFAZOLIN IN DEXTROSE 2000 MG/ 100 ML SOLUTION: Performed by: ORTHOPAEDIC SURGERY

## 2024-03-07 PROCEDURE — 25010000002 DROPERIDOL PER 5 MG: Performed by: NURSE ANESTHETIST, CERTIFIED REGISTERED

## 2024-03-07 PROCEDURE — 25010000002 SUGAMMADEX 200 MG/2ML SOLUTION: Performed by: NURSE ANESTHETIST, CERTIFIED REGISTERED

## 2024-03-07 PROCEDURE — C9290 INJ, BUPIVACAINE LIPOSOME: HCPCS | Performed by: STUDENT IN AN ORGANIZED HEALTH CARE EDUCATION/TRAINING PROGRAM

## 2024-03-07 PROCEDURE — 73020 X-RAY EXAM OF SHOULDER: CPT

## 2024-03-07 PROCEDURE — 25010000002 PROPOFOL 200 MG/20ML EMULSION: Performed by: NURSE ANESTHETIST, CERTIFIED REGISTERED

## 2024-03-07 PROCEDURE — 63710000001 ROPINIROLE 1 MG TABLET: Performed by: ORTHOPAEDIC SURGERY

## 2024-03-07 PROCEDURE — 63710000001 ATORVASTATIN 20 MG TABLET: Performed by: ORTHOPAEDIC SURGERY

## 2024-03-07 PROCEDURE — 25010000002 CEFAZOLIN PER 500 MG: Performed by: ORTHOPAEDIC SURGERY

## 2024-03-07 PROCEDURE — 63710000001 ROPINIROLE 2 MG TABLET: Performed by: ORTHOPAEDIC SURGERY

## 2024-03-07 PROCEDURE — 63710000001 ROPINIROLE 2 MG TABLET: Performed by: STUDENT IN AN ORGANIZED HEALTH CARE EDUCATION/TRAINING PROGRAM

## 2024-03-07 PROCEDURE — 25010000002 CEFAZOLIN IN DEXTROSE 2-4 GM/100ML-% SOLUTION: Performed by: ORTHOPAEDIC SURGERY

## 2024-03-07 PROCEDURE — 63710000001 CARVEDILOL 3.125 MG TABLET: Performed by: ORTHOPAEDIC SURGERY

## 2024-03-07 PROCEDURE — 25010000002 DEXAMETHASONE SODIUM PHOSPHATE 20 MG/5ML SOLUTION: Performed by: NURSE ANESTHETIST, CERTIFIED REGISTERED

## 2024-03-07 PROCEDURE — 25010000002 FENTANYL CITRATE (PF) 50 MCG/ML SOLUTION: Performed by: ANESTHESIOLOGY

## 2024-03-07 DEVICE — TRY ADAPT HUM/SHLDR EQUINOXE FOR/REV/TOTL PLS0: Type: IMPLANTABLE DEVICE | Site: SHOULDER | Status: FUNCTIONAL

## 2024-03-07 DEVICE — STEM HUM PRI EQUINOXE PRESSFIT 14MM SHT: Type: IMPLANTABLE DEVICE | Site: SHOULDER | Status: FUNCTIONAL

## 2024-03-07 DEVICE — IMPLANTABLE DEVICE: Type: IMPLANTABLE DEVICE | Status: FUNCTIONAL

## 2024-03-07 DEVICE — IMPLANTABLE DEVICE: Type: IMPLANTABLE DEVICE | Site: SHOULDER | Status: FUNCTIONAL

## 2024-03-07 DEVICE — SCRW COMPR EQUINOXE LK 4.5X34MM: Type: IMPLANTABLE DEVICE | Site: SHOULDER | Status: FUNCTIONAL

## 2024-03-07 DEVICE — SCRW COMPR EQUINOXE LK 4.5X22MM: Type: IMPLANTABLE DEVICE | Site: SHOULDER | Status: FUNCTIONAL

## 2024-03-07 DEVICE — SCRW LK EQUINOXE GLENOSPHERE REV/SHLDR: Type: IMPLANTABLE DEVICE | Site: SHOULDER | Status: FUNCTIONAL

## 2024-03-07 DEVICE — SCRW EQUINOXE TORQ DEFINE REV SHLDR KT: Type: IMPLANTABLE DEVICE | Site: SHOULDER | Status: FUNCTIONAL

## 2024-03-07 DEVICE — LINER HUM EQUINOXE REVSHLDR 38PLS2.5: Type: IMPLANTABLE DEVICE | Site: SHOULDER | Status: FUNCTIONAL

## 2024-03-07 DEVICE — PLT GLEN JNT EQUINOXE AUG POST 8DEG RT: Type: IMPLANTABLE DEVICE | Site: SHOULDER | Status: FUNCTIONAL

## 2024-03-07 RX ORDER — ONDANSETRON 2 MG/ML
4 INJECTION INTRAMUSCULAR; INTRAVENOUS EVERY 6 HOURS PRN
Status: DISCONTINUED | OUTPATIENT
Start: 2024-03-07 | End: 2024-03-08 | Stop reason: HOSPADM

## 2024-03-07 RX ORDER — ONDANSETRON 2 MG/ML
4 INJECTION INTRAMUSCULAR; INTRAVENOUS ONCE AS NEEDED
Status: DISCONTINUED | OUTPATIENT
Start: 2024-03-07 | End: 2024-03-07 | Stop reason: HOSPADM

## 2024-03-07 RX ORDER — FLUMAZENIL 0.1 MG/ML
0.2 INJECTION INTRAVENOUS AS NEEDED
Status: DISCONTINUED | OUTPATIENT
Start: 2024-03-07 | End: 2024-03-07 | Stop reason: HOSPADM

## 2024-03-07 RX ORDER — LEVOTHYROXINE SODIUM 0.03 MG/1
25 TABLET ORAL
Status: DISCONTINUED | OUTPATIENT
Start: 2024-03-08 | End: 2024-03-08 | Stop reason: HOSPADM

## 2024-03-07 RX ORDER — HYDROMORPHONE HYDROCHLORIDE 1 MG/ML
0.25 INJECTION, SOLUTION INTRAMUSCULAR; INTRAVENOUS; SUBCUTANEOUS
Status: DISCONTINUED | OUTPATIENT
Start: 2024-03-07 | End: 2024-03-07 | Stop reason: HOSPADM

## 2024-03-07 RX ORDER — EPHEDRINE SULFATE 50 MG/ML
5 INJECTION, SOLUTION INTRAVENOUS ONCE AS NEEDED
Status: DISCONTINUED | OUTPATIENT
Start: 2024-03-07 | End: 2024-03-07 | Stop reason: HOSPADM

## 2024-03-07 RX ORDER — BUPROPION HYDROCHLORIDE 300 MG/1
300 TABLET ORAL EVERY MORNING
Status: DISCONTINUED | OUTPATIENT
Start: 2024-03-08 | End: 2024-03-08 | Stop reason: HOSPADM

## 2024-03-07 RX ORDER — HYDROCODONE BITARTRATE AND ACETAMINOPHEN 5; 325 MG/1; MG/1
1 TABLET ORAL ONCE AS NEEDED
Status: DISCONTINUED | OUTPATIENT
Start: 2024-03-07 | End: 2024-03-07 | Stop reason: HOSPADM

## 2024-03-07 RX ORDER — ONDANSETRON 2 MG/ML
INJECTION INTRAMUSCULAR; INTRAVENOUS
Status: COMPLETED
Start: 2024-03-07 | End: 2024-03-07

## 2024-03-07 RX ORDER — CARVEDILOL 3.12 MG/1
3.12 TABLET ORAL 2 TIMES DAILY WITH MEALS
Status: DISCONTINUED | OUTPATIENT
Start: 2024-03-07 | End: 2024-03-08 | Stop reason: HOSPADM

## 2024-03-07 RX ORDER — SODIUM CHLORIDE 0.9 % (FLUSH) 0.9 %
3-10 SYRINGE (ML) INJECTION AS NEEDED
Status: DISCONTINUED | OUTPATIENT
Start: 2024-03-07 | End: 2024-03-07 | Stop reason: HOSPADM

## 2024-03-07 RX ORDER — MORPHINE SULFATE 2 MG/ML
6 INJECTION, SOLUTION INTRAMUSCULAR; INTRAVENOUS
Status: DISCONTINUED | OUTPATIENT
Start: 2024-03-07 | End: 2024-03-08 | Stop reason: HOSPADM

## 2024-03-07 RX ORDER — FENTANYL CITRATE 50 UG/ML
25 INJECTION, SOLUTION INTRAMUSCULAR; INTRAVENOUS
Status: DISCONTINUED | OUTPATIENT
Start: 2024-03-07 | End: 2024-03-07 | Stop reason: HOSPADM

## 2024-03-07 RX ORDER — ROCURONIUM BROMIDE 10 MG/ML
INJECTION, SOLUTION INTRAVENOUS AS NEEDED
Status: DISCONTINUED | OUTPATIENT
Start: 2024-03-07 | End: 2024-03-07 | Stop reason: SURG

## 2024-03-07 RX ORDER — NALOXONE HYDROCHLORIDE 4 MG/.1ML
SPRAY NASAL
Qty: 2 EACH | Refills: 0 | Status: SHIPPED | OUTPATIENT
Start: 2024-03-07

## 2024-03-07 RX ORDER — NALOXONE HCL 0.4 MG/ML
0.2 VIAL (ML) INJECTION AS NEEDED
Status: DISCONTINUED | OUTPATIENT
Start: 2024-03-07 | End: 2024-03-07 | Stop reason: HOSPADM

## 2024-03-07 RX ORDER — ROPINIROLE 2 MG/1
4 TABLET, FILM COATED ORAL ONCE
Qty: 2 TABLET | Refills: 0 | Status: COMPLETED | OUTPATIENT
Start: 2024-03-07 | End: 2024-03-07

## 2024-03-07 RX ORDER — NALOXONE HCL 0.4 MG/ML
0.4 VIAL (ML) INJECTION
Status: DISCONTINUED | OUTPATIENT
Start: 2024-03-07 | End: 2024-03-08 | Stop reason: HOSPADM

## 2024-03-07 RX ORDER — MIDAZOLAM HYDROCHLORIDE 1 MG/ML
0.5 INJECTION INTRAMUSCULAR; INTRAVENOUS
Status: DISCONTINUED | OUTPATIENT
Start: 2024-03-07 | End: 2024-03-07 | Stop reason: HOSPADM

## 2024-03-07 RX ORDER — DROPERIDOL 2.5 MG/ML
0.62 INJECTION, SOLUTION INTRAMUSCULAR; INTRAVENOUS
Status: DISCONTINUED | OUTPATIENT
Start: 2024-03-07 | End: 2024-03-07 | Stop reason: HOSPADM

## 2024-03-07 RX ORDER — ALBUTEROL SULFATE 90 UG/1
2 AEROSOL, METERED RESPIRATORY (INHALATION) EVERY 6 HOURS PRN
Status: DISCONTINUED | OUTPATIENT
Start: 2024-03-07 | End: 2024-03-08 | Stop reason: HOSPADM

## 2024-03-07 RX ORDER — MAGNESIUM HYDROXIDE 1200 MG/15ML
LIQUID ORAL AS NEEDED
Status: DISCONTINUED | OUTPATIENT
Start: 2024-03-07 | End: 2024-03-07 | Stop reason: HOSPADM

## 2024-03-07 RX ORDER — IPRATROPIUM BROMIDE AND ALBUTEROL SULFATE 2.5; .5 MG/3ML; MG/3ML
3 SOLUTION RESPIRATORY (INHALATION) ONCE AS NEEDED
Status: DISCONTINUED | OUTPATIENT
Start: 2024-03-07 | End: 2024-03-07 | Stop reason: HOSPADM

## 2024-03-07 RX ORDER — LABETALOL HYDROCHLORIDE 5 MG/ML
5 INJECTION, SOLUTION INTRAVENOUS
Status: DISCONTINUED | OUTPATIENT
Start: 2024-03-07 | End: 2024-03-07 | Stop reason: HOSPADM

## 2024-03-07 RX ORDER — LIDOCAINE HYDROCHLORIDE 10 MG/ML
0.5 INJECTION, SOLUTION INFILTRATION; PERINEURAL ONCE AS NEEDED
Status: DISCONTINUED | OUTPATIENT
Start: 2024-03-07 | End: 2024-03-07 | Stop reason: HOSPADM

## 2024-03-07 RX ORDER — ATORVASTATIN CALCIUM 20 MG/1
20 TABLET, FILM COATED ORAL DAILY
Status: DISCONTINUED | OUTPATIENT
Start: 2024-03-07 | End: 2024-03-08 | Stop reason: HOSPADM

## 2024-03-07 RX ORDER — ONDANSETRON 4 MG/1
4 TABLET, ORALLY DISINTEGRATING ORAL EVERY 6 HOURS PRN
Qty: 20 TABLET | Refills: 0 | Status: SHIPPED | OUTPATIENT
Start: 2024-03-07

## 2024-03-07 RX ORDER — ONDANSETRON 4 MG/1
4 TABLET, ORALLY DISINTEGRATING ORAL EVERY 6 HOURS PRN
Status: DISCONTINUED | OUTPATIENT
Start: 2024-03-07 | End: 2024-03-08 | Stop reason: HOSPADM

## 2024-03-07 RX ORDER — LIDOCAINE HYDROCHLORIDE 20 MG/ML
INJECTION, SOLUTION EPIDURAL; INFILTRATION; INTRACAUDAL; PERINEURAL AS NEEDED
Status: DISCONTINUED | OUTPATIENT
Start: 2024-03-07 | End: 2024-03-07 | Stop reason: SURG

## 2024-03-07 RX ORDER — TRANEXAMIC ACID 100 MG/ML
INJECTION, SOLUTION INTRAVENOUS AS NEEDED
Status: DISCONTINUED | OUTPATIENT
Start: 2024-03-07 | End: 2024-03-07 | Stop reason: SURG

## 2024-03-07 RX ORDER — DEXAMETHASONE SODIUM PHOSPHATE 4 MG/ML
INJECTION, SOLUTION INTRA-ARTICULAR; INTRALESIONAL; INTRAMUSCULAR; INTRAVENOUS; SOFT TISSUE AS NEEDED
Status: DISCONTINUED | OUTPATIENT
Start: 2024-03-07 | End: 2024-03-07 | Stop reason: SURG

## 2024-03-07 RX ORDER — FAMOTIDINE 10 MG/ML
20 INJECTION, SOLUTION INTRAVENOUS ONCE
Status: COMPLETED | OUTPATIENT
Start: 2024-03-07 | End: 2024-03-07

## 2024-03-07 RX ORDER — MORPHINE SULFATE 2 MG/ML
4 INJECTION, SOLUTION INTRAMUSCULAR; INTRAVENOUS
Status: DISCONTINUED | OUTPATIENT
Start: 2024-03-07 | End: 2024-03-08 | Stop reason: HOSPADM

## 2024-03-07 RX ORDER — OXYCODONE AND ACETAMINOPHEN 10; 325 MG/1; MG/1
1 TABLET ORAL EVERY 4 HOURS PRN
Qty: 30 TABLET | Refills: 0 | Status: SHIPPED | OUTPATIENT
Start: 2024-03-07

## 2024-03-07 RX ORDER — OXYCODONE HYDROCHLORIDE AND ACETAMINOPHEN 5; 325 MG/1; MG/1
2 TABLET ORAL EVERY 4 HOURS PRN
Status: DISCONTINUED | OUTPATIENT
Start: 2024-03-07 | End: 2024-03-08 | Stop reason: HOSPADM

## 2024-03-07 RX ORDER — EPHEDRINE SULFATE 50 MG/ML
INJECTION INTRAVENOUS AS NEEDED
Status: DISCONTINUED | OUTPATIENT
Start: 2024-03-07 | End: 2024-03-07 | Stop reason: SURG

## 2024-03-07 RX ORDER — SODIUM CHLORIDE, SODIUM LACTATE, POTASSIUM CHLORIDE, CALCIUM CHLORIDE 600; 310; 30; 20 MG/100ML; MG/100ML; MG/100ML; MG/100ML
9 INJECTION, SOLUTION INTRAVENOUS CONTINUOUS
Status: DISCONTINUED | OUTPATIENT
Start: 2024-03-07 | End: 2024-03-08 | Stop reason: HOSPADM

## 2024-03-07 RX ORDER — PROPOFOL 10 MG/ML
INJECTION, EMULSION INTRAVENOUS AS NEEDED
Status: DISCONTINUED | OUTPATIENT
Start: 2024-03-07 | End: 2024-03-07 | Stop reason: SURG

## 2024-03-07 RX ORDER — SODIUM CHLORIDE 450 MG/100ML
75 INJECTION, SOLUTION INTRAVENOUS CONTINUOUS
Status: DISCONTINUED | OUTPATIENT
Start: 2024-03-07 | End: 2024-03-08 | Stop reason: HOSPADM

## 2024-03-07 RX ORDER — HYDRALAZINE HYDROCHLORIDE 20 MG/ML
5 INJECTION INTRAMUSCULAR; INTRAVENOUS
Status: DISCONTINUED | OUTPATIENT
Start: 2024-03-07 | End: 2024-03-07 | Stop reason: HOSPADM

## 2024-03-07 RX ORDER — CEFAZOLIN SODIUM 2 G/100ML
2000 INJECTION, SOLUTION INTRAVENOUS EVERY 8 HOURS
Status: COMPLETED | OUTPATIENT
Start: 2024-03-07 | End: 2024-03-07

## 2024-03-07 RX ORDER — FENTANYL CITRATE 50 UG/ML
50 INJECTION, SOLUTION INTRAMUSCULAR; INTRAVENOUS ONCE AS NEEDED
Status: COMPLETED | OUTPATIENT
Start: 2024-03-07 | End: 2024-03-07

## 2024-03-07 RX ORDER — BUPIVACAINE HYDROCHLORIDE 5 MG/ML
INJECTION, SOLUTION EPIDURAL; INTRACAUDAL
Status: COMPLETED | OUTPATIENT
Start: 2024-03-07 | End: 2024-03-07

## 2024-03-07 RX ORDER — SUCCINYLCHOLINE/SOD CL,ISO/PF 200MG/10ML
SYRINGE (ML) INTRAVENOUS AS NEEDED
Status: DISCONTINUED | OUTPATIENT
Start: 2024-03-07 | End: 2024-03-07 | Stop reason: SURG

## 2024-03-07 RX ORDER — DIPHENHYDRAMINE HYDROCHLORIDE 50 MG/ML
12.5 INJECTION INTRAMUSCULAR; INTRAVENOUS
Status: DISCONTINUED | OUTPATIENT
Start: 2024-03-07 | End: 2024-03-07 | Stop reason: HOSPADM

## 2024-03-07 RX ORDER — DROPERIDOL 2.5 MG/ML
INJECTION, SOLUTION INTRAMUSCULAR; INTRAVENOUS AS NEEDED
Status: DISCONTINUED | OUTPATIENT
Start: 2024-03-07 | End: 2024-03-07 | Stop reason: SURG

## 2024-03-07 RX ORDER — ONDANSETRON 2 MG/ML
4 INJECTION INTRAMUSCULAR; INTRAVENOUS ONCE
Status: COMPLETED | OUTPATIENT
Start: 2024-03-07 | End: 2024-03-07

## 2024-03-07 RX ORDER — OXYCODONE AND ACETAMINOPHEN 10; 325 MG/1; MG/1
0.5 TABLET ORAL EVERY 6 HOURS PRN
Status: DISCONTINUED | OUTPATIENT
Start: 2024-03-07 | End: 2024-03-08 | Stop reason: HOSPADM

## 2024-03-07 RX ORDER — PHENYLEPHRINE HCL IN 0.9% NACL 1 MG/10 ML
SYRINGE (ML) INTRAVENOUS AS NEEDED
Status: DISCONTINUED | OUTPATIENT
Start: 2024-03-07 | End: 2024-03-07 | Stop reason: SURG

## 2024-03-07 RX ORDER — SODIUM CHLORIDE 0.9 % (FLUSH) 0.9 %
3 SYRINGE (ML) INJECTION EVERY 12 HOURS SCHEDULED
Status: DISCONTINUED | OUTPATIENT
Start: 2024-03-07 | End: 2024-03-07 | Stop reason: HOSPADM

## 2024-03-07 RX ORDER — PROMETHAZINE HYDROCHLORIDE 25 MG/1
25 SUPPOSITORY RECTAL ONCE AS NEEDED
Status: DISCONTINUED | OUTPATIENT
Start: 2024-03-07 | End: 2024-03-07 | Stop reason: HOSPADM

## 2024-03-07 RX ORDER — PROMETHAZINE HYDROCHLORIDE 25 MG/1
25 TABLET ORAL ONCE AS NEEDED
Status: DISCONTINUED | OUTPATIENT
Start: 2024-03-07 | End: 2024-03-07 | Stop reason: HOSPADM

## 2024-03-07 RX ORDER — DIAZEPAM 5 MG/1
5 TABLET ORAL EVERY 6 HOURS PRN
Status: DISCONTINUED | OUTPATIENT
Start: 2024-03-07 | End: 2024-03-08 | Stop reason: HOSPADM

## 2024-03-07 RX ORDER — CEFAZOLIN SODIUM 2 G/100ML
2000 INJECTION, SOLUTION INTRAVENOUS ONCE
Status: COMPLETED | OUTPATIENT
Start: 2024-03-07 | End: 2024-03-07

## 2024-03-07 RX ORDER — HYDROCODONE BITARTRATE AND ACETAMINOPHEN 7.5; 325 MG/1; MG/1
1 TABLET ORAL EVERY 4 HOURS PRN
Status: DISCONTINUED | OUTPATIENT
Start: 2024-03-07 | End: 2024-03-07 | Stop reason: HOSPADM

## 2024-03-07 RX ADMIN — DROPERIDOL 0.62 MG: 2.5 INJECTION, SOLUTION INTRAMUSCULAR; INTRAVENOUS at 08:15

## 2024-03-07 RX ADMIN — ROPINIROLE 4 MG: 2 TABLET, FILM COATED ORAL at 10:08

## 2024-03-07 RX ADMIN — EPHEDRINE SULFATE 10 MG: 50 INJECTION INTRAVENOUS at 08:49

## 2024-03-07 RX ADMIN — EPHEDRINE SULFATE 10 MG: 50 INJECTION INTRAVENOUS at 08:46

## 2024-03-07 RX ADMIN — SODIUM CHLORIDE 75 ML/HR: 4.5 INJECTION, SOLUTION INTRAVENOUS at 14:11

## 2024-03-07 RX ADMIN — Medication 100 MG: at 08:05

## 2024-03-07 RX ADMIN — BUPIVACAINE 10 ML: 13.3 INJECTION, SUSPENSION, LIPOSOMAL INFILTRATION at 07:24

## 2024-03-07 RX ADMIN — FAMOTIDINE 20 MG: 10 INJECTION INTRAVENOUS at 07:20

## 2024-03-07 RX ADMIN — CEFAZOLIN SODIUM 2000 MG: 2 INJECTION, SOLUTION INTRAVENOUS at 07:56

## 2024-03-07 RX ADMIN — ATORVASTATIN CALCIUM 20 MG: 20 TABLET, FILM COATED ORAL at 14:10

## 2024-03-07 RX ADMIN — Medication 100 MCG: at 08:53

## 2024-03-07 RX ADMIN — EPHEDRINE SULFATE 10 MG: 50 INJECTION INTRAVENOUS at 08:18

## 2024-03-07 RX ADMIN — SODIUM CHLORIDE, POTASSIUM CHLORIDE, SODIUM LACTATE AND CALCIUM CHLORIDE 9 ML/HR: 600; 310; 30; 20 INJECTION, SOLUTION INTRAVENOUS at 07:41

## 2024-03-07 RX ADMIN — BUPIVACAINE HYDROCHLORIDE 15 ML: 5 INJECTION, SOLUTION EPIDURAL; INTRACAUDAL; PERINEURAL at 07:24

## 2024-03-07 RX ADMIN — DEXAMETHASONE SODIUM PHOSPHATE 10 MG: 4 INJECTION, SOLUTION INTRAMUSCULAR; INTRAVENOUS at 08:10

## 2024-03-07 RX ADMIN — ONDANSETRON 4 MG: 2 INJECTION INTRAMUSCULAR; INTRAVENOUS at 07:52

## 2024-03-07 RX ADMIN — SUGAMMADEX 200 MG: 100 INJECTION, SOLUTION INTRAVENOUS at 09:24

## 2024-03-07 RX ADMIN — Medication 50 MCG: at 08:58

## 2024-03-07 RX ADMIN — TRANEXAMIC ACID 1000 MG: 100 INJECTION INTRAVENOUS at 08:15

## 2024-03-07 RX ADMIN — ROPINIROLE 3 MG: 2 TABLET, FILM COATED ORAL at 20:31

## 2024-03-07 RX ADMIN — FENTANYL CITRATE 50 MCG: 50 INJECTION, SOLUTION INTRAMUSCULAR; INTRAVENOUS at 07:20

## 2024-03-07 RX ADMIN — PROPOFOL 120 MG: 10 INJECTION, EMULSION INTRAVENOUS at 08:05

## 2024-03-07 RX ADMIN — LIDOCAINE HYDROCHLORIDE 100 MG: 20 INJECTION, SOLUTION EPIDURAL; INFILTRATION; INTRACAUDAL; PERINEURAL at 08:05

## 2024-03-07 RX ADMIN — CEFAZOLIN SODIUM 2000 MG: 2 INJECTION, SOLUTION INTRAVENOUS at 20:48

## 2024-03-07 RX ADMIN — CEFAZOLIN SODIUM 2000 MG: 2 INJECTION, SOLUTION INTRAVENOUS at 14:10

## 2024-03-07 RX ADMIN — ROCURONIUM BROMIDE 35 MG: 10 INJECTION, SOLUTION INTRAVENOUS at 08:23

## 2024-03-07 RX ADMIN — CARVEDILOL 3.12 MG: 3.12 TABLET, FILM COATED ORAL at 18:21

## 2024-03-07 NOTE — OP NOTE
Date: 3/7/2024  Time: 09:32 EST TOTAL SHOULDER REVERSE ARTHROPLASTY  Procedure Note    Chula Adkins  3/7/2024    Pre-op Diagnosis:    Rotator cuff arthropathy right shoulder    Post-op Diagnosis   Rotator cuff arthropathy right shoulder    Procedure:  Right reverse total shoulder arthroplasty with intraoperative computer navigation    Surgeon: Sukhwinder Hightower M.D.    Surgical assistant: KATHY Coleman      Anesthesia: General with Block  Anesthesiologist: Robinson Pacheco MD  CRNA: Angelika West CRNA    Staff:   Circulator: Lilli Alvarado RN  Scrub Person: Arcelia Lopez  Vendor Representative: Floyd Feldman ([facility])  Assistant: Venessa Isabel APRN    Indication for Asst.  A surgical assistant, KATHY Coleman, was utilized as a medical necessity and was present through the entire procedure allowing safe completion of the procedure by helping with exposure and placement of implants as well as reducing overall operative time and morbidity for the patient.    IV antibiotic: Cefazolin    Estimated Blood Loss: 200 ml    Specimens: * No orders in the log *    Complications: None    Implants: [employer] reverse total shoulder system     Implant specifics: 14 mm Preserve press-fit stem, +0 humeral tray with torque limiting screw, +2.5 mm humeral liner 38 mm, 8 degree posterior augmented baseplate with 4 compression screws and locking caps, 38 mm expanded glenosphere +4 mm with central screw.    SURGICAL APPROACH: Deltopectoral      SURGICAL TECHNIQUE: Peel off    Subscapularis: Not repaired    Procedure: The patient was taken to the operative suite.  After adequate anesthesia was established the upper extremity was prepped and draped in the usual fashion.  The head was placed in a neutral position and bony prominences were padded.  Ioban was utilized covering the skin over the shoulder and axilla.  Preoperative antibiotics and transexamic acid were confirmed.  An  anterior incision was made starting lateral to the coracoid towards the axillary crease through skin and subcutaneous tissues.  The deltopectoral interval was entered.  The cephalic vein was identified, preserved, and retracted laterally.  The conjoined tendon was reflected medially.  The biceps tendon was identified near the pectoralis insertion site.  A small portion of the pectoralis tendon was released and then the biceps tendon was tenodesed to the pectoralis tendon insertion on the humeral.  The biceps tendon was then tenotomized more proximally.  The subscapularis was peeled off the lesser tuberosity and tagged.  Arthropathic changes were noted in the glenohumeral joint.  The rotator cuff was in poor condition with significant tearing of the rotator cuff with retraction and atrophy.  The capsule was released off the humeral neck and the posterior soft tissue attachments were protected. An external cutting guide was utilized and the head was cut at a 20° retroverted angle.  Excess osteophytes were excised with a rongeur.  Multiple anchors from prior rotator cuff repairs were removed from the proximal humeral bone as well as sutures.  Sequential impaction broaching was carried out using the Preserve system broaches.  The final broach was left in place for later trialing.   I then visualized the glenoid and retractors were placed starting posteriorly and superiorly.  The capsule was reflected off the deep surface of the subscapularis.  An anterior retractor was placed.  The labrum was then circumferentially excised off of the glenoid.  The biceps stump was released and removed as well.  A careful capsular release off the inferior glenoid was performed protecting the axillary nerve and vasculature.  This exposed the scapular pillar.    This allowed for visualization of the glenoid.  The coracoid was skeletonized with the Bovie exposing the anterior portion and the neck of the coracoid.  A tracker was fixed to the  coracoid with 2 small bicortical screws with firm fixation.  Acquisitions were then obtained from the bony surface of the coracoid and glenoid to transfer data to the computer to allow for navigation.  Once this was complete I was able to use computer guidance for placement of the glenoid.  The preoperative plan demonstrated approximating 16 degrees of retroversion was noted and corrected well with an 8 degree posterior augmented baseplate to Preserve bone during reaming..  Using navigation a central drill point was drilled.  Sequential reaming was carried out at the prescribed angle and to the prescribed depth based on the preoperative plan using intraoperative computer guidance.    A central hole was then drilled for the cage using computer navigation.  This assured that we were within the vault of the glenoid and preserved as much bone as possible during reaming.    Autogenous bone graft was harvested from the humeral head and packed into the cage of the baseplate.  The baseplate was then compressed onto the glenoid.  Rotation was checked with computer navigation.  Computer navigation was then used to drill holes for the compression screws.  These were then placed to the appropriate depth and locking caps were placed over the screw heads.  Good baseplate placement and stable fixation were noted.  An appropriate size glenosphere based on the preoperative plan and intraoperative visualization of the anatomy was chosen and fixed to the baseplate with a central compression screw.  I then removed the tracker from the coracoid by removing both bicortical screws.  I then trialed the humeral tray and polyethylene.  I was looking for smooth nonimpinged range of motion.  I also wanted to assure good stability by placing traction on the arm and also checking for lateral stability.  Once satisfied with range of motion and stability I removed the humeral components.  I pulse lavaged the proximal humerus and press-fit the  appropriate size stem with good purchase and fixation.  Next I re-trialed the humeral tray and humeral liner.  When I was satisfied with range of motion and stability I removed the trial liner and tray.  The appropriate humeral tray was then placed and held with a torque limiting screw.  The chosen polyethylene was then inserted and compressed into place and the shoulder was reduced.  Subdeltoid scar tissue was excised.  Good range of motion was noted.  Good stability was noted.  Vigorous irrigation and suctioning was performed.  The subscapularis was evaluated for repair and was in poor condition and not repaired.  The deltopectoral interval was closed with 0 Vicryl.  The subcutaneous tissues were closed with 2-0 Vicryl.  The skin was closed with a zip line device.  The patient had a sterile compression dressing applied and was awoken and taken to the postanesthetic recovery unit in good condition.    Sukhwinder Hightower MD     Date: 3/7/2024  Time: 09:32 EST

## 2024-03-07 NOTE — ANESTHESIA PROCEDURE NOTES
Airway  Urgency: elective    Date/Time: 3/7/2024 8:07 AM  Airway not difficult    General Information and Staff    Patient location during procedure: OR  Anesthesiologist: Robinson Pacheco MD  CRNA/CAA: Angelika West CRNA    Indications and Patient Condition  Indications for airway management: airway protection    Preoxygenated: yes  Mask difficulty assessment: 0 - not attempted    Final Airway Details  Final airway type: endotracheal airway      Successful airway: ETT  Cuffed: yes   Successful intubation technique: direct laryngoscopy  Facilitating devices/methods: intubating stylet  Endotracheal tube insertion site: oral  Blade: Yee  Blade size: 2  ETT size (mm): 7.0  Cormack-Lehane Classification: grade I - full view of glottis  Placement verified by: chest auscultation and capnometry   Measured from: lips  ETT/EBT  to lips (cm): 19  Number of attempts at approach: 1  Assessment: lips, teeth, and gum same as pre-op and atraumatic intubation    Additional Comments  Atraumatic, MOP to cuff, BSBE, no change to dentition, secured with tape

## 2024-03-07 NOTE — ANESTHESIA PROCEDURE NOTES
Peripheral Block    Pre-sedation assessment completed: 3/7/2024 7:19 AM    Patient reassessed immediately prior to procedure    Patient location during procedure: pre-op  Start time: 3/7/2024 7:20 AM  Stop time: 3/7/2024 7:24 AM  Reason for block: at surgeon's request and post-op pain management  Performed by  Anesthesiologist: Robinson Pacheco MD  Preanesthetic Checklist  Completed: patient identified, IV checked, site marked, risks and benefits discussed, surgical consent, monitors and equipment checked, pre-op evaluation and timeout performed  Prep:  Pt Position: supine  Sterile barriers:cap, gloves and mask  Prep: ChloraPrep  Patient monitoring: blood pressure monitoring, continuous pulse oximetry and EKG  Procedure    Sedation: yes  Performed under: local infiltration  Guidance:ultrasound guided    ULTRASOUND INTERPRETATION.  Using ultrasound guidance a 21 G gauge needle was placed in close proximity to the nerve, at which point, under ultrasound guidance anesthetic was injected in the area of the nerve and spread of the anesthesia was seen on ultrasound in close proximity thereto.  There were no abnormalities seen on ultrasound; a digital image was taken; and the patient tolerated the procedure with no complications. Images:still images obtained, printed/placed on chart    Laterality:right  Block Type:interscalene  Injection Technique:single-shot  Needle Type:echogenic and Tuohy  Needle Gauge:21 G  Resistance on Injection: none    Medications Used: bupivacaine liposome (EXPAREL) 1.3 % injection - Infiltration   10 mL - 3/7/2024 7:24:00 AM  bupivacaine (PF) (MARCAINE) 0.5 % injection - Injection   15 mL - 3/7/2024 7:24:00 AM      Post Assessment  Injection Assessment: negative aspiration for heme, no paresthesia on injection and incremental injection  Patient Tolerance:comfortable throughout block  Complications:no  Additional Notes  Ultrasound guidance used to visualize nerve anatomy, guide needle placement  and verify local anesthetic disbursement.

## 2024-03-07 NOTE — ANESTHESIA POSTPROCEDURE EVALUATION
Patient: Chula Adkins    Procedure Summary       Date: 03/07/24 Room / Location:  [facility] [address] [place] [address] [facility]    Anesthesia Start: 0757 Anesthesia Stop: 0936    Procedure: TOTAL SHOULDER REVERSE ARTHROPLASTY (Right: Shoulder) Diagnosis:     Surgeons: Sukhwinder Hightower MD Provider: Robinson Pacheco MD    Anesthesia Type: general with block ASA Status: 3            Anesthesia Type: general with block    Vitals  Vitals Value Taken Time   /69 03/07/24 1100   Temp 36.4 °C (97.5 °F) 03/07/24 0935   Pulse 72 03/07/24 1101   Resp 18 03/07/24 1100   SpO2 96 % 03/07/24 1101   Vitals shown include unfiled device data.        Post Anesthesia Care and Evaluation    Patient location during evaluation: bedside  Patient participation: complete - patient participated  Level of consciousness: awake  Pain management: adequate    Airway patency: patent  Anesthetic complications: No anesthetic complications    Cardiovascular status: acceptable  Respiratory status: acceptable  Hydration status: acceptable

## 2024-03-07 NOTE — PLAN OF CARE
Goal Outcome Evaluation:           Progress: improving  Outcome Evaluation: Patient with no complaints of pain, block in effect. IV abx given per MAR. Anticipate discharge home tomorrow if deemed stable.

## 2024-03-07 NOTE — DISCHARGE INSTRUCTIONS
Dr. Sukhwinder Hightower  [address] [address]  [phone]    Outpatient REVERSE TOTAL SHOULDER REPLACEMENT  HOSPITAL DISCHARGE INSTRUCTIONS     GENERAL INFORMATION: With improved surgical techniques for total shoulder and reverse total shoulder replacement and the Rast[employer]arian of ultrasound guided long acting nerve blocks, the hospital stay for patients has decreased significantly.  Many people can go home right after surgery as an outpatient.    SPECIFIC POST-OP INSTRUCTIONS:  * FOLLOW UP APPOINTMENT: You should have been given an appointment to follow up 10-14 days after your date of surgery. We can see you back sooner if there are any problems or concerns.   * BLOOD THINNERS: All patients will be on some type of blood thinner post-op to prevent blood clot. Most patients who are not already on a blood thinner are discharged on over-the-counter aspirin 81 mg or 325mg daily which you will take for three weeks. If you were taking a blood thinner prior to surgery, we will have you resume this on the first day post-op.   * ICE: Ice helps to decrease both pain and swelling. Ice should be applied to the shoulder 20-25 minutes each hour while awake.    DRESSING CHANGES: We ask that you keep the incision clean and dry.  Your surgical dressing will be changed in the hospital by the nursing staff prior to your discharge.     SHOWERING: The wound edges typically come together and seal by 3-5 days post-op if there is no drainage. Again, please keep the same waterproof dressing on. DO NOT SUBMERSE SHOULDER IN POOL,HOT TUB OR BATH UNTIL AT LEAST 3-4 WEEKS POST-OP (EVEN WITH WATERPROOF BANDAIDS).  * PAIN  MEDICINE: You will be given a prescription for oral pain medication prior to discharge from the hospital. Please let us know if you have a sensitivity to certain pain medications prior to discharge. Additional pain medication prescriptions can be called into your pharmacy during normal business hours. NO medications can be  "called in over the weekends or after business hours.   * ORAL ANTI-INFLAMMATORIES:   You will be asked to discontinue ALL oral anti-inflammatories prior to surgery. You can resume these the first day post-op.These can be combined with the oral pain medications safely. DO NOT TAKE ADDITIONAL TYLENOL WITH THE NARCOTIC PAIN MEDICATION (it already has Tylenol in it). If you were not taking an anti-inflammatory pre-op, you can start one on the first day post-op. It will help decrease pain and swelling. Typical medications and dosages are as follows:   Advil/Motrin/Ibuprofen 200mg, 4 pills every 8 hours   Aleve/Naproxen Sodium 220mg, 2 pills every 12 hours  * SLING: We recommend you wear the sling at all times, other than when showering or doing physical therapy exercises.    * WEIGHT BEARING: We ask that you be non-weight bearing on the operative shoulder. Do not use the operative arm to lift/push/pull greater than 5lbs.   * PHYSICAL THERAPY: Before you leave the hospital staff will teach you some very gentle range of motion exercises to do at home for the first 10-14 days. After we see you in the office during your first post-op visit, we will give you a prescription to start formal physical therapy. This can be done downstairs at [facility] or at a location of your choice. Physical therapy is typically 2-3 times a week for 4-6 weeks, depending on the individual and their progress.   * DRIVING A CAR: Medically/legally we can not recommend you drive a car while in the sling or while on pain medication (roughly 10-14 days).   * RETURNING TO DAILY AND RECREATIONAL ACTIVITIES: For the most part patients can progress to daily activities as tolerated (keeping in mind the restrictions listed above). Initially, we do not want you to \"overdo\" it in an attempt to minimize post-op pain and swelling. Once the swelling is controlled, you can progress with activities as tolerated. Pain and swelling should be your guide to increasing your " activity level.   * RETURN TO WORK: The return to work date depends on many factors and is very dependent on the individual. You would most likely be able to return to a sedentary job after your first post-op visit (10-14 days after surgery). A more physical job would obviously require a longer recovery time before return to work.

## 2024-03-07 NOTE — ANESTHESIA PREPROCEDURE EVALUATION
Anesthesia Evaluation     NPO Solid Status: > 8 hours             Airway   Mallampati: III  No difficulty expected  Dental    (+) lower dentures and upper dentures    Pulmonary    (+) pulmonary embolism,  Cardiovascular     PT is on anticoagulation therapy  Patient on routine beta blocker and Beta blocker given within 24 hours of surgery    (+) hypertension, CHF , hyperlipidemia    ROS comment: No eliquis in one month.    Neuro/Psych  (+) psychiatric history  GI/Hepatic/Renal/Endo    (+) thyroid problem     Musculoskeletal     (+) back pain  Abdominal    Substance History      OB/GYN          Other   arthritis,                   Anesthesia Plan    ASA 3     general with block     intravenous induction     Anesthetic plan, risks, benefits, and alternatives have been provided, discussed and informed consent has been obtained with: patient.    CODE STATUS:

## 2024-03-07 NOTE — DISCHARGE SUMMARY
Orthopedic Discharge Summary      Patient: Chula Adkins      YOB: 1940    Medical Record Number: [id number]    Attending Physician: Sukhwinder Hightower MD  Consulting Physician(s):   Date of Admission: 3/7/2024  6:04 AM  Date of Discharge: 3/8/2024      S/P reverse total shoulder arthroplasty, right    Status post reverse arthroplasty of shoulder, right    [unfilled]    Allergies: No Known Allergies    Current Medications:     Discharge Medications        New Medications        Instructions Start Date   Magnesium 500 MG capsule   1 capsule, Oral, Daily, LAST DOSE 9/15/22      naloxone 4 MG/0.1ML nasal spray  Commonly known as: NARCAN   Call 911. Don't prime. [place] in 1 nostril for overdose. Repeat in 2-3 minutes in other nostril if no or minimal breathing/responsiveness.      ondansetron ODT 4 MG disintegrating tablet  Commonly known as: ZOFRAN-ODT   4 mg, Translingual, Every 6 Hours PRN             Changes to Medications        Instructions Start Date   albuterol sulfate  (90 Base) MCG/ACT inhaler  Commonly known as: PROVENTIL HFA;VENTOLIN HFA;PROAIR HFA  What changed: additional instructions   2 puffs, Inhalation, Every 6 Hours PRN      oxyCODONE-acetaminophen  MG per tablet  Commonly known as: PERCOCET  What changed: Another medication with the same name was added. Make sure you understand how and when to take each.   0.5 tablets, Oral, Every 6 Hours PRN      oxyCODONE-acetaminophen  MG per tablet  Commonly known as: PERCOCET  What changed: You were already taking a medication with the same name, and this prescription was added. Make sure you understand how and when to take each.   1 tablet, Oral, Every 4 Hours PRN             Continue These Medications        Instructions Start Date   apixaban 5 MG tablet tablet  Commonly known as: ELIQUIS   5 mg, Oral, 2 Times Daily, INSTRUCTED PT TO FOLLOW MD INSTRUCTIONS REGARDING HOLDING FOR SURGERY      bumetanide 1 MG  tablet  Commonly known as: BUMEX   2 mg, Oral, Daily PRN      buPROPion  MG 24 hr tablet  Commonly known as: WELLBUTRIN XL   300 mg, Oral, Every Morning      carvedilol 3.125 MG tablet  Commonly known as: COREG   3.125 mg, Oral, 2 Times Daily With Meals, INST PER ANESTHESIA PROTOCOL      fish oil 1000 MG capsule capsule   1,000 mg, Oral, Daily With Breakfast, HOLD FOR SURGERY      levothyroxine 25 MCG tablet  Commonly known as: SYNTHROID, LEVOTHROID   25 mcg, Oral, Every Early Morning      multivitamin with minerals tablet tablet   Oral, HOLD PRIOR TO SURG      polyethylene glycol 17 g packet  Commonly known as: MIRALAX   17 g, Oral, Daily      rOPINIRole 3 MG tablet  Commonly known as: REQUIP   3 mg, Oral, Nightly      simvastatin 40 MG tablet  Commonly known as: ZOCOR   40 mg, Oral, Nightly      Trulance 3 MG tablet  Generic drug: Plecanatide   1 tablet, Oral, Nightly               Past Medical History:   Diagnosis Date    Anxiety     Arthritis     CHF (congestive heart failure)     NO RECENT ISSUES    Chronic pain     NECK AND LOW BACK    Constipation     Disease of thyroid gland     Fibromyalgia     Hemorrhoids     History of acute respiratory failure     10/2023    History of pulmonary embolus (PE)     ON ELIQUIS    History of sepsis     10/2023    Hyperlipidemia     Hypertension     Lactic acidosis     Pain in back     SPONDYLOSIS, LUMBAR, PAIN [facility] CLINIC    Restless leg syndrome     Shoulder pain, bilateral      Past Surgical History:   Procedure Laterality Date    BLADDER REPAIR      COLON SURGERY      REPAIR FROM HOLE IN COLON    COLONOSCOPY      ELBOW PROCEDURE Left     MSSA  WOUND, COMPLETLY RESOLVED    EXCISION LESION Right     MAXILARY GLAND X2    FOOT SURGERY Bilateral     TARSAL TUNNEL SURGERY    GALLBLADDER SURGERY      HYSTERECTOMY      RECTAL PROLAPSE REPAIR      2019 IN [place]    RECTOPEXY N/A 10/14/2022    Procedure: ROBOT ASSISTED  RECTOPEXY;  Surgeon: Navjot Diaz MD;   [place] [facility] [place] [facility] [place]  Service: Robotics - [place];  Laterality: N/A;    SHOULDER SURGERY Right     RCR X2     Social History     Occupational History    Not on file   Tobacco Use    Smoking status: Never     Passive exposure: Past    Smokeless tobacco: Never   Vaping Use    Vaping status: Never Used   Substance and Sexual Activity    Alcohol use: Never    Drug use: Never    Sexual activity: Not Currently      Social History     Social History Narrative    ** Merged History Encounter **          Family History   Problem Relation Age of Onset    Stroke Sister     Hypertension Neg Hx     Diabetes Neg Hx     Cancer Neg Hx     Malig Hyperthermia Neg Hx        Physical Exam: 83 y.o. female  General Appearance:    Alert, cooperative, in no acute distress                      Vitals:    03/07/24 1106 03/07/24 1115 03/07/24 1130 03/07/24 1240   BP:  105/59 120/73 102/65   BP Location:    Left arm   Patient Position:    Lying   Pulse: 70 74 71 72   Resp:   16 16   Temp:   97.5 °F (36.4 °C) 97.5 °F (36.4 °C)   TempSrc:   Oral Oral   SpO2: 95% 91% 94% 93%   Weight:    88.9 kg (196 lb)   Height:    157.5 cm (62\")                                                    Extremities:   Incision intact without signs or symptoms of infection.               Neurovascular status remains intact to operative extremity.      Moves all extremities well, no edema, no cyanosis, no              redness   Pulses:   Pulses palpable and equal bilaterally   Skin:   No bleeding, bruising or rash   Lymph nodes:   No palpable adenopathy   Neurologic:   Cranial nerves 2 - 12 grossly intact, sensation intact           Hospital Course:  83 y.o. female admitted to [facility] to services of Sukhwinder Hightower MD status post right reverse total shoulder arthroplasty.   Antibiotic and VTE prophylaxis were per SCIP protocols. Post-operatively the patient transferred to the post-operative floor where the patient underwent mobilization therapy that " included active as well as passive ROM exercises. Opioids were titrated to achieve appropriate pain management to allow for participation in mobilization exercises. Vital signs are now stable. The incision is intact without signs or symptoms of infection. Operative extremity neurovascular status remains intact.   Appropriate education re: incision care, activity levels, medications, and follow up visits was completed and all questions were answered. The patient is now deemed stable for discharge to Home.    Discharge and Follow up Instructions: Follow up in 10-14 days.    Date: [unfilled]  Sukhwinder Hightower MD

## 2024-03-08 ENCOUNTER — NURSE TRIAGE (OUTPATIENT)
Dept: CALL CENTER | Facility: HOSPITAL | Age: 84
End: 2024-03-08
Payer: MEDICARE

## 2024-03-08 VITALS
SYSTOLIC BLOOD PRESSURE: 120 MMHG | RESPIRATION RATE: 16 BRPM | DIASTOLIC BLOOD PRESSURE: 72 MMHG | WEIGHT: 196 LBS | OXYGEN SATURATION: 94 % | BODY MASS INDEX: 36.07 KG/M2 | HEIGHT: 62 IN | TEMPERATURE: 97.6 F | HEART RATE: 71 BPM

## 2024-03-08 LAB
ANION GAP SERPL CALCULATED.3IONS-SCNC: 11.3 MMOL/L (ref 5–15)
BUN SERPL-MCNC: 29 MG/DL (ref 8–23)
BUN/CREAT SERPL: 33.7 (ref 7–25)
CALCIUM SPEC-SCNC: 8.7 MG/DL (ref 8.6–10.5)
CHLORIDE SERPL-SCNC: 106 MMOL/L (ref 98–107)
CO2 SERPL-SCNC: 22.7 MMOL/L (ref 22–29)
CREAT SERPL-MCNC: 0.86 MG/DL (ref 0.57–1)
EGFRCR SERPLBLD CKD-EPI 2021: 68 ML/MIN/1.73
GLUCOSE SERPL-MCNC: 138 MG/DL (ref 65–99)
HCT VFR BLD AUTO: 34.4 % (ref 34–46.6)
HGB BLD-MCNC: 11.1 G/DL (ref 12–15.9)
POTASSIUM SERPL-SCNC: 4.7 MMOL/L (ref 3.5–5.2)
SODIUM SERPL-SCNC: 140 MMOL/L (ref 136–145)

## 2024-03-08 PROCEDURE — 85014 HEMATOCRIT: CPT | Performed by: ORTHOPAEDIC SURGERY

## 2024-03-08 PROCEDURE — 97110 THERAPEUTIC EXERCISES: CPT

## 2024-03-08 PROCEDURE — 63710000001 BUPROPION XL 300 MG TABLET SUSTAINED-RELEASE 24 HOUR: Performed by: ORTHOPAEDIC SURGERY

## 2024-03-08 PROCEDURE — A9270 NON-COVERED ITEM OR SERVICE: HCPCS | Performed by: ORTHOPAEDIC SURGERY

## 2024-03-08 PROCEDURE — 97535 SELF CARE MNGMENT TRAINING: CPT

## 2024-03-08 PROCEDURE — 80048 BASIC METABOLIC PNL TOTAL CA: CPT | Performed by: ORTHOPAEDIC SURGERY

## 2024-03-08 PROCEDURE — G0378 HOSPITAL OBSERVATION PER HR: HCPCS

## 2024-03-08 PROCEDURE — 85018 HEMOGLOBIN: CPT | Performed by: ORTHOPAEDIC SURGERY

## 2024-03-08 PROCEDURE — 63710000001 CARVEDILOL 3.125 MG TABLET: Performed by: ORTHOPAEDIC SURGERY

## 2024-03-08 PROCEDURE — 63710000001 ATORVASTATIN 20 MG TABLET: Performed by: ORTHOPAEDIC SURGERY

## 2024-03-08 PROCEDURE — 97165 OT EVAL LOW COMPLEX 30 MIN: CPT

## 2024-03-08 PROCEDURE — 63710000001 LEVOTHYROXINE 25 MCG TABLET: Performed by: ORTHOPAEDIC SURGERY

## 2024-03-08 PROCEDURE — 63710000001 OXYCODONE-ACETAMINOPHEN 5-325 MG TABLET: Performed by: ORTHOPAEDIC SURGERY

## 2024-03-08 PROCEDURE — 63710000001 APIXABAN 5 MG TABLET: Performed by: ORTHOPAEDIC SURGERY

## 2024-03-08 RX ADMIN — BUPROPION HYDROCHLORIDE 300 MG: 300 TABLET, EXTENDED RELEASE ORAL at 08:22

## 2024-03-08 RX ADMIN — APIXABAN 5 MG: 5 TABLET, FILM COATED ORAL at 08:22

## 2024-03-08 RX ADMIN — ATORVASTATIN CALCIUM 20 MG: 20 TABLET, FILM COATED ORAL at 08:22

## 2024-03-08 RX ADMIN — OXYCODONE HYDROCHLORIDE AND ACETAMINOPHEN 2 TABLET: 5; 325 TABLET ORAL at 01:06

## 2024-03-08 RX ADMIN — LEVOTHYROXINE SODIUM 25 MCG: 25 TABLET ORAL at 05:18

## 2024-03-08 RX ADMIN — CARVEDILOL 3.12 MG: 3.12 TABLET, FILM COATED ORAL at 08:22

## 2024-03-08 NOTE — TELEPHONE ENCOUNTER
Patient states prescriptions for medications are not at pharmacy. Reviewed patient's chart. Prescriptions for Percocet and Zofran sent yesterday. Patient requesting prescriptions for Eliquis and Lipitor. Spoke with LISA Stark who discharged patient. Patient was already on Eliquis and Lipitor so it will not be prescribed. Notified the patient that she will need to contact her PCP, Dr. Mathias, for refills of Eliquis and Lipitor. If the pharmacy does not have her Zofran and Percocet, she will need to contact Dr. Hightower's office. Patient verbalizes understanding.    Reason for Disposition   [1] Prescription not at pharmacy AND [2] was prescribed by PCP recently (Exception: [facility] has access to EMR and prescription is recorded there. Go to Home Care and confirm for pharmacy.)    Additional Information   Negative: [1] Intentional drug overdose AND [2] suicidal thoughts or ideas   Negative: Drug overdose and triager unable to answer question   Negative: Caller requesting a renewal or refill of a medicine patient is currently taking   Negative: Caller requesting information unrelated to medicine   Negative: Caller requesting information about COVID-19 Vaccine   Negative: Caller requesting information about Emergency Contraception   Negative: Caller requesting information about Combined Birth Control Pills   Negative: Caller requesting information about Progestin Birth Control Pills   Negative: Caller requesting information about Post-Op pain or medicines   Negative: Caller requesting a prescription antibiotic (such as Penicillin) for Strep throat and has a positive culture result   Negative: Caller requesting a prescription anti-viral med (such as Tamiflu) and has influenza (flu) symptoms   Negative: Immunization reaction suspected   Negative: Rash while taking a medicine or within 3 days of stopping it   Negative: [1] Asthma and [2] having symptoms of asthma (cough, wheezing, etc.)   Negative: [1] Symptom of illness (e.g.,  "headache, abdominal pain, earache, vomiting) AND [2] more than mild   Negative: Breastfeeding questions about mother's medicines and diet   Negative: MORE THAN A DOUBLE DOSE of a prescription or over-the-counter (OTC) drug   Negative: [1] DOUBLE DOSE (an extra dose or lesser amount) of prescription drug AND [2] any symptoms (e.g., dizziness, nausea, pain, sleepiness)   Negative: [1] DOUBLE DOSE (an extra dose or lesser amount) of over-the-counter (OTC) drug AND [2] any symptoms (e.g., dizziness, nausea, pain, sleepiness)   Negative: Took another person's prescription drug   Negative: [1] DOUBLE DOSE (an extra dose or lesser amount) of prescription drug AND [2] NO symptoms  (Exception: A double dose of antibiotics.)   Negative: Diabetes drug error or overdose (e.g., took wrong type of insulin or took extra dose)    Answer Assessment - Initial Assessment Questions  1. NAME of MEDICINE: \"What medicine(s) are you calling about?\"      Eliquis, Lipitor  2. QUESTION: \"What is your question?\" (e.g., double dose of medicine, side effect)      Prescriptions not received by pharmacy  3. PRESCRIBER: \"Who prescribed the medicine?\" Reason: if prescribed by specialist, call should be referred to that group.      Dr. Mathias  4. SYMPTOMS: \"Do you have any symptoms?\" If Yes, ask: \"What symptoms are you having?\"  \"How bad are the symptoms (e.g., mild, moderate, severe)      NA  5. PREGNANCY:  \"Is there any chance that you are pregnant?\" \"When was your last menstrual period?\"      NA    Protocols used: Medication Question Call-ADULT-    "

## 2024-03-08 NOTE — THERAPY EVALUATION
Patient Name: Chula Adkins  : 1940    MRN: [id number]                              Today's Date: 3/8/2024       Admit Date: 3/7/2024    Visit Dx:     ICD-10-CM ICD-9-CM   1. Status post reverse arthroplasty of shoulder, right  Z96.611 V43.61     Patient Active Problem List   Diagnosis    Acute respiratory failure with hypoxia    Chronic right shoulder pain    Rectal prolapse    Chronic maxillary sinusitis    S/P reverse total shoulder arthroplasty, right    Status post reverse arthroplasty of shoulder, right     Past Medical History:   Diagnosis Date    Anxiety     Arthritis     CHF (congestive heart failure)     NO RECENT ISSUES    Chronic pain     NECK AND LOW BACK    Constipation     Disease of thyroid gland     Fibromyalgia     Hemorrhoids     History of acute respiratory failure     10/2023    History of pulmonary embolus (PE)     ON ELIQUIS    History of sepsis     10/2023    Hyperlipidemia     Hypertension     Lactic acidosis     Pain in back     SPONDYLOSIS, LUMBAR, PAIN [facility] CL[facility]IC    Restless leg syndrome     Shoulder pain, bilateral      Past Surgical History:   Procedure Laterality Date    BLADDER REPAIR      COLON SURGERY      REPAIR FROM HOLE IN COLON    COLONOSCOPY      ELBOW PROCEDURE Left     MSSA  WOUND, COMPLETLY RESOLVED    EXCISION LESION Right     MAXILARY GLAND X2    FOOT SURGERY Bilateral     TARSAL TUNNEL SURGERY    GALLBLADDER SURGERY      HYSTERECTOMY      RECTAL PROLAPSE REPAIR      2019 IN [place]    RECTOPEXY N/A 10/14/2022    Procedure: ROBOT ASSISTED  RECTOPEXY;  Surgeon: Navjot Diaz MD;  Location: [facility] OR [facility];  Service: Robotics - [employer];  Laterality: N/A;    SHOULDER SURGERY Right     RCR X2    TOTAL SHOULDER ARTHROPLASTY W/ DISTAL CLAVICLE EXCISION Right 3/7/2024    Procedure: TOTAL SHOULDER REVERSE ARTHROPLASTY;  Surgeon: Sukhwinder Hightower MD;  Location: [facility] OR [facility];  Service: Orthopedics;  Laterality: Right;      General Information       Row Name  03/08/24 0949          OT Time and Intention    Document Type discharge evaluation/summary  -     Mode of Treatment occupational therapy  -       Row Name 03/08/24 0949          General Information    Patient Profile Reviewed yes  -     Prior Level of Function independent:;ADL's  -     Existing Precautions/Restrictions non-weight bearing;shoulder;right  -       Row Name 03/08/24 0949          Living Environment    People in Home spouse  -       Row Name 03/08/24 0949          Cognition    Orientation Status (Cognition) oriented x 4  -       Row Name 03/08/24 0949          Safety Issues, Functional Mobility    Impairments Affecting Function (Mobility) pain;strength;range of motion (ROM)  -               User Key  (r) = Recorded By, (t) = Taken By, (c) = Cosigned By      Initials Name Provider Type     Lizzeth Abrams OT Occupational Therapist                     Mobility/ADL's       Row Name 03/08/24 0950          Bed Mobility    Bed Mobility supine-sit  -     Supine-Sit [place] (Bed Mobility) standby assist  -       Row Name 03/08/24 0950          Transfers    Transfers bed-chair transfer  -       Row Name 03/08/24 0950          Bed-Chair Transfer    Bed-Chair [place] (Transfers) standby assist  -       Row Name 03/08/24 0950          Functional Mobility    Functional Mobility- Ind. Level standby assist  -       Row Name 03/08/24 0950          Activities of Daily Living    BADL Assessment/Intervention upper body dressing;lower body dressing;toileting  -       Row Name 03/08/24 0950          Lower Body Dressing Assessment/Training    [place] Level (Lower Body Dressing) don;pants/bottoms;standby assist  -     Comment, (Lower Body Dressing) OT provided education on safe technique while maintain shoulder precautions  -       Row Name 03/08/24 0950          Upper Body Dressing Assessment/Training    [place] Level (Upper Body Dressing) don;front opening  garment;contact guard assist  -     Comment, (Upper Body Dressing) OT provided education on safe technique while maintain shoulder precautions  -       Row Name 03/08/24 0950          Toileting Assessment/Training    [place] Level (Toileting) toileting skills;standby assist  -               User Key  (r) = Recorded By, (t) = Taken By, (c) = Cosigned By      Initials Name Provider Type     Lizzeth Abrams, OT Occupational Therapist                   Obj/Interventions       [facility] Name 03/08/24 0953          Sensory Assessment (Somatosensory)    Sensory Assessment Pt reports block remains mostly intake  -[facility] Name 03/08/24 0953          Range of Motion Comprehensive    Comment, General Range of Motion LUE AROM WFL, RUE limited by surgical block today  -[facility] Name 03/08/24 0953          Strength Comprehensive (MMT)    Comment, General Manual Muscle Testing (MMT) Assessment LUE > 3/5, RUE NT due to precautions  -[facility] Name 03/08/24 0953          Shoulder (Therapeutic Exercise)    Shoulder (Therapeutic Exercise) pendulum exercises  education and demo from OT only today due to block intake  -[facility] Name 03/08/24 0953          Elbow/Forearm (Therapeutic Exercise)    Elbow/Forearm (Therapeutic Exercise) PROM (passive range of motion)  -     Elbow/Forearm PROM (Therapeutic Exercise) right;flexion;extension;supination;pronation  -[facility] Name 03/08/24 0953          Wrist (Therapeutic Exercise)    Wrist (Therapeutic Exercise) AROM (active range of motion)  -     Wrist AROM (Therapeutic Exercise) flexion;extension;10 repetitions;right  -[facility] Name 03/08/24 0953          Hand (Therapeutic Exercise)    Hand (Therapeutic Exercise) AROM (active range of motion)  -     Hand AROM/AAROM (Therapeutic Exercise) AROM (active range of motion);finger flexion;finger extension;10 repetitions;right  -[facility] Name 03/08/24 0953          Motor Skills    Therapeutic Exercise  shoulder;elbow/forearm;wrist;hand  -       Row Name 03/08/24 0953          Balance    Balance Assessment standing static balance;standing dynamic balance  -     Static Standing Balance standby assist  -     Dynamic Standing Balance standby assist  -               User Key  (r) = Recorded By, (t) = Taken By, (c) = Cosigned By      Initials Name Provider Type    Lizzeth Bush OT Occupational Therapist                   Goals/Plan       Row Name 03/08/24 1000          Dressing Goal 1 (OT)    Activity/Device (Dressing Goal 1, OT) dressing skills, all  -SM     Time Frame (Dressing Goal 1, OT) short term goal (STG);by discharge  -     Strategies/Barriers (Dressing Goal 1, OT) Pt to be able to complete using safe technique for shoulder precautions  -SM     Progress/Outcome (Dressing Goal 1, OT) goal met  -       Row Name 03/08/24 1000          ROM Goal 1 (OT)    ROM Goal 1 (OT) Pt to be independent with TSA HEP as prescribed by MD/OT.  -     Time Frame (ROM Goal 1, OT) short term goal (STG);by discharge  -SM     Progress/Outcome (ROM Goal 1, OT) goal met  -               User Key  (r) = Recorded By, (t) = Taken By, (c) = Cosigned By      Initials Name Provider Type     Lizzeth Abrams OT Occupational Therapist                   Clinical Impression       Row Name 03/08/24 0957          Pain Assessment    Pretreatment Pain Rating 0/10 - no pain  -     Posttreatment Pain Rating 0/10 - no pain  -     Pain Intervention(s) Cold applied;Medication (See MAR)  -       Row Name 03/08/24 0957          Plan of Care Review    Plan of Care Reviewed With patient  -     Outcome Evaluation Pt is a 83 y.o female POD 1 R reverse total shoulder arthroplasty. Pt participated in OT today to address education regarding shoulder precautions, one handed dressing techniques, initation of HEP, sling managemet. Pt plans to dc home with assist from spouse. Pt does well with all education today.  -       Row Name  03/08/24 0957          Therapy Assessment/Plan (OT)    Rehab Potential (OT) good, to achieve stated therapy goals  -     Criteria for Skilled Therapeutic Interventions Met (OT) yes  -SM     Therapy Frequency (OT) evaluation only  -       Row Name 03/08/24 0957          Therapy Plan Review/Discharge Plan (OT)    Anticipated Discharge Disposition (OT) home with home health;home with outpatient therapy services  -       Row Name 03/08/24 0957          Vital Signs    O2 Delivery Pre Treatment room air  -SM     O2 Delivery Intra Treatment room air  -SM     O2 Delivery Post Treatment room air  -SM       Row Name 03/08/24 0957          Positioning and Restraints    Pre-Treatment Position sitting in chair/recliner  -SM     Post Treatment Position chair  -SM     In Chair reclined;notified nsg;call light within reach;encouraged to call for assist  -SM               User Key  (r) = Recorded By, (t) = Taken By, (c) = Cosigned By      Initials Name Provider Type    Lizzeth Bush, OT Occupational Therapist                   Outcome Measures       Row Name 03/08/24 1002          How much help from another is currently needed...    Putting on and taking off regular lower body clothing? 3  -SM     Bathing (including washing, rinsing, and drying) 3  -SM     Toileting (which includes using toilet bed pan or urinal) 3  -SM     Putting on and taking off regular upper body clothing 3  -SM     Taking care of personal grooming (such as brushing teeth) 3  -SM     Eating meals 3  -SM     AM-PAC 6 Clicks Score (OT) 18  -SM       Row Name 03/08/24 0825          How much help from another person do you currently need...    Turning from your back to your side while in flat bed without using bedrails? 3  -EE     Moving from lying on back to sitting on the side of a flat bed without bedrails? 3  -EE     Moving to and from a bed to a chair (including a wheelchair)? 3  -EE     Standing up from a chair using your arms (e.g., wheelchair,  bedside chair)? 3  -EE     Climbing 3-5 steps with a railing? 2  -EE     To walk in hospital room? 3  -EE     AM-PAC 6 Clicks Score (PT) 17  -EE     Highest Level of Mobility Goal 5 --> Static standing  -EE       Row Name 03/08/24 1002          Functional Assessment    Outcome Measure Options AM-PAC 6 Clicks Daily Activity (OT)  -               User Key  (r) = Recorded By, (t) = Taken By, (c) = Cosigned By      Initials Name Provider Type     Lizzeth Abrams OT Occupational Therapist    Mi Miranda, RN Registered Nurse                    Occupational Therapy Education       Title: PT OT SLP Therapies (In Progress)       Topic: Occupational Therapy (In Progress)       Point: ADL training (Done)       Description:   Instruct learner(s) on proper safety adaptation and remediation techniques during self care or transfers.   Instruct in proper use of assistive devices.                  Learning Progress Summary             Patient Acceptance, E, VU by  at 3/8/2024 1002    Comment: shoulder precautions, sling management (positioning, donning/doffing), HEP, post op ADL safety   Family Acceptance, E, VU by  at 3/8/2024 1002    Comment: shoulder precautions, sling management (positioning, donning/doffing), HEP, post op ADL safety                         Point: Home exercise program (Done)       Description:   Instruct learner(s) on appropriate technique for monitoring, assisting and/or progressing therapeutic exercises/activities.                  Learning Progress Summary             Patient Acceptance, E, VU by  at 3/8/2024 1002    Comment: shoulder precautions, sling management (positioning, donning/doffing), HEP, post op ADL safety   Family Acceptance, E, VU by  at 3/8/2024 1002    Comment: shoulder precautions, sling management (positioning, donning/doffing), HEP, post op ADL safety                         Point: Precautions (Done)       Description:   Instruct learner(s) on prescribed precautions  during self-care and functional transfers.                  Learning Progress Summary             Patient Acceptance, E, VU by  at 3/8/2024 1002    Comment: shoulder precautions, sling management (positioning, donning/doffing), HEP, post op ADL safety   Family Acceptance, E, VU by  at 3/8/2024 1002    Comment: shoulder precautions, sling management (positioning, donning/doffing), HEP, post op ADL safety                         Point: Body mechanics (Not Started)       Description:   Instruct learner(s) on proper positioning and spine alignment during self-care, functional mobility activities and/or exercises.                  Learner Progress:  Not documented in this visit.                              User Key       Initials Effective Dates Name Provider Type Discipline     04/02/20 -  Lizzeth Abrams OT Occupational Therapist OT                  OT Recommendation and Plan  Therapy Frequency (OT): evaluation only  Plan of Care Review  Plan of Care Reviewed With: patient  Outcome Evaluation: Pt is a 83 y.o female POD 1 R reverse total shoulder arthroplasty. Pt participated in OT today to address education regarding shoulder precautions, one handed dressing techniques, initation of HEP, sling managemet. Pt plans to dc home with assist from spouse. Pt does well with all education today.     Time Calculation:   Evaluation Complexity (OT)  Review Occupational Profile/Medical/Therapy History Complexity: brief/low complexity  Assessment, Occupational Performance/Identification of Deficit Complexity: 1-3 performance deficits  Clinical Decision Making Complexity (OT): problem focused assessment/low complexity  Overall Complexity of Evaluation (OT): low complexity     Time Calculation- OT       Row Name 03/08/24 1004             Time Calculation- OT    OT Start Time 0818  -      OT Stop Time 0847  -      OT Time Calculation (min) 29 min  -      Total Timed Code Minutes- OT 23 minute(s)  -      OT Received On  03/08/24  -SM         Timed Charges    53786 - OT Therapeutic Exercise Minutes 8  -SM      95243 - OT Self Care/Mgmt Minutes 15  -SM         Untimed Charges    OT Eval/Re-eval Minutes 6  -SM         Total Minutes    Timed Charges Total Minutes 23  -SM      Untimed Charges Total Minutes 6  -SM       Total Minutes 29  -SM                User Key  (r) = Recorded By, (t) = Taken By, (c) = Cosigned By      Initials Name Provider Type     Lizzeth Abrams OT Occupational Therapist                  Therapy Charges for Today       Code Description Service Date Service Provider Modifiers Qty    35308484415 HC OT THER PROC EA 15 MIN 3/8/2024 Lizzeth Abrams OT GO 1    25934819739 HC OT SELF CARE/MGMT/TRAIN EA 15 MIN 3/8/2024 Lizzeth Abrams OT GO 1    00548431571 HC OT EVAL LOW COMPLEXITY 2 3/8/2024 Lizzeth Abrams OT GO 1                 Lizzeth Abrams OT  3/8/2024

## 2024-03-08 NOTE — PROGRESS NOTES
Orthopedic Progress Note    Subjective     Post-Operative Day: POD #1  Systemic or Specific Complaints: No Complaints. Pain is under control with PO meds.  Interscalene block is still in effect    Objective     Vital signs in last 24 hours:  Temp:  [97.5 °F (36.4 °C)-98 °F (36.7 °C)] 97.8 °F (36.6 °C)  Heart Rate:  [68-84] 73  Resp:  [16-27] 16  BP: ()/() 113/75    General: alert, appears stated age and cooperative   Neurovascular: Interscalene block still in effect  Radial pulse: 2+.   Wound: Dressing clean and dry. No evidence of infection.   Range of Motion: Hand/Wrist/elbow ROM WNL. ROM shoulder not tested.     Data Review  CBC:  Results from last 7 days   Lab Units 03/08/24  0329 03/01/24  1618   WBC 10*3/mm3  --  5.89   RBC 10*6/mm3  --  4.27   HEMOGLOBIN g/dL 11.1* 12.7   HEMATOCRIT % 34.4 38.4   PLATELETS 10*3/mm3  --  289       Assessment & Plan     IMPRESSION:stable status post right reverse total shoulder arthroplasty    PLAN: D/C home today after physical therapy.  Change dressing to a light watertight bandage. Follow up in office 10-14 days (already scheduled).  Physical therapy is scheduled.    Activity: As directed by physical therapy.  Sling for protection..      LOS: 0 days     Sukhwinder Hightower MD    Date: 3/8/2024  Time: 07:37 EST

## 2024-03-08 NOTE — CASE MANAGEMENT/SOCIAL WORK
Case Management Discharge Note      Final Note: home         Selected Continued Care - Discharged on 3/8/2024 Admission date: 3/7/2024 - Discharge disposition: Home or Self Care      Destination    No services have been selected for the patient.                Durable Medical Equipment    No services have been selected for the patient.                Dialysis/Infusion    No services have been selected for the patient.                Home Medical Care    No services have been selected for the patient.                Therapy    No services have been selected for the patient.                Community Resources    No services have been selected for the patient.                Community & DME    No services have been selected for the patient.                         Final Discharge Disposition Code: 01 - home or self-care

## 2024-03-08 NOTE — PLAN OF CARE
Plan of Care Reviewed With: patient           Outcome Evaluation: Pt is a 83 y.o female POD 1 R reverse total shoulder arthroplasty. Pt participated in OT today to address education regarding shoulder precautions, one handed dressing techniques, initation of HEP, sling managemet. Pt plans to dc home with assist from spouse. Pt does well with all education today.      Anticipated Discharge Disposition (OT): home with home health, home with outpatient therapy services

## 2024-03-15 ENCOUNTER — TELEPHONE (OUTPATIENT)
Dept: ORTHOPEDIC SURGERY | Facility: HOSPITAL | Age: 84
End: 2024-03-15
Payer: MEDICARE

## 2024-03-15 NOTE — TELEPHONE ENCOUNTER
Called and spoke with Ms. Adkins to see how she is doing as she is 1 week SP [facility]. She said she is doing well. She is enjoying looking out the window and seeing the trees begin to bloom. She is doing her exercises. Pain is well controlled. She has a F/U with the MD office Wednesday. Things seem to be going really well. Ms. Adkins doesn't have any questions for me at this time. She was given my contact information should she need anything.

## 2024-05-07 ENCOUNTER — HOSPITAL ENCOUNTER (EMERGENCY)
Facility: HOSPITAL | Age: 84
Discharge: HOME | End: 2024-05-07
Attending: EMERGENCY MEDICINE | Admitting: EMERGENCY MEDICINE
Payer: MEDICARE

## 2024-05-07 ENCOUNTER — APPOINTMENT (OUTPATIENT)
Dept: CT IMAGING | Facility: HOSPITAL | Age: 84
End: 2024-05-07
Payer: MEDICARE

## 2024-05-07 VITALS
WEIGHT: 191.58 LBS | SYSTOLIC BLOOD PRESSURE: 140 MMHG | TEMPERATURE: 98 F | OXYGEN SATURATION: 91 % | HEART RATE: 79 BPM | DIASTOLIC BLOOD PRESSURE: 76 MMHG | HEIGHT: 64 IN | RESPIRATION RATE: 20 BRPM | BODY MASS INDEX: 32.71 KG/M2

## 2024-05-07 DIAGNOSIS — T40.2X5A THERAPEUTIC OPIOID INDUCED CONSTIPATION: Primary | ICD-10-CM

## 2024-05-07 DIAGNOSIS — K59.03 THERAPEUTIC OPIOID INDUCED CONSTIPATION: Primary | ICD-10-CM

## 2024-05-07 LAB
ALBUMIN SERPL-MCNC: 4.4 G/DL (ref 3.5–5.2)
ALBUMIN/GLOB SERPL: 1.6 G/DL
ALP SERPL-CCNC: 84 U/L (ref 39–117)
ALT SERPL W P-5'-P-CCNC: 12 U/L (ref 1–33)
ANION GAP SERPL CALCULATED.3IONS-SCNC: 12 MMOL/L (ref 5–15)
AST SERPL-CCNC: 16 U/L (ref 1–32)
BASOPHILS # BLD AUTO: 0.07 10*3/MM3 (ref 0–0.2)
BASOPHILS NFR BLD AUTO: 1 % (ref 0–1.5)
BILIRUB SERPL-MCNC: 0.8 MG/DL (ref 0–1.2)
BILIRUB UR QL STRIP: NEGATIVE
BUN SERPL-MCNC: 31 MG/DL (ref 8–23)
BUN/CREAT SERPL: 26.3 (ref 7–25)
CALCIUM SPEC-SCNC: 10.1 MG/DL (ref 8.6–10.5)
CHLORIDE SERPL-SCNC: 102 MMOL/L (ref 98–107)
CLARITY UR: CLEAR
CO2 SERPL-SCNC: 25 MMOL/L (ref 22–29)
COLOR UR: YELLOW
CREAT SERPL-MCNC: 1.18 MG/DL (ref 0.57–1)
D-LACTATE SERPL-SCNC: 1.2 MMOL/L (ref 0.5–2)
DEPRECATED RDW RBC AUTO: 43 FL (ref 37–54)
EGFRCR SERPLBLD CKD-EPI 2021: 46.5 ML/MIN/1.73
EOSINOPHIL # BLD AUTO: 0.33 10*3/MM3 (ref 0–0.4)
EOSINOPHIL NFR BLD AUTO: 4.6 % (ref 0.3–6.2)
ERYTHROCYTE [DISTWIDTH] IN BLOOD BY AUTOMATED COUNT: 12.7 % (ref 12.3–15.4)
GLOBULIN UR ELPH-MCNC: 2.8 GM/DL
GLUCOSE SERPL-MCNC: 89 MG/DL (ref 65–99)
GLUCOSE UR STRIP-MCNC: NEGATIVE MG/DL
HCT VFR BLD AUTO: 37.8 % (ref 34–46.6)
HGB BLD-MCNC: 12.3 G/DL (ref 12–15.9)
HGB UR QL STRIP.AUTO: NEGATIVE
HOLD SPECIMEN: NORMAL
HOLD SPECIMEN: NORMAL
IMM GRANULOCYTES # BLD AUTO: 0.02 10*3/MM3 (ref 0–0.05)
IMM GRANULOCYTES NFR BLD AUTO: 0.3 % (ref 0–0.5)
KETONES UR QL STRIP: NEGATIVE
LEUKOCYTE ESTERASE UR QL STRIP.AUTO: NEGATIVE
LIPASE SERPL-CCNC: 32 U/L (ref 13–60)
LYMPHOCYTES # BLD AUTO: 1.61 10*3/MM3 (ref 0.7–3.1)
LYMPHOCYTES NFR BLD AUTO: 22.5 % (ref 19.6–45.3)
MCH RBC QN AUTO: 30.1 PG (ref 26.6–33)
MCHC RBC AUTO-ENTMCNC: 32.5 G/DL (ref 31.5–35.7)
MCV RBC AUTO: 92.4 FL (ref 79–97)
MONOCYTES # BLD AUTO: 0.73 10*3/MM3 (ref 0.1–0.9)
MONOCYTES NFR BLD AUTO: 10.2 % (ref 5–12)
NEUTROPHILS NFR BLD AUTO: 4.39 10*3/MM3 (ref 1.7–7)
NEUTROPHILS NFR BLD AUTO: 61.4 % (ref 42.7–76)
NITRITE UR QL STRIP: NEGATIVE
NRBC BLD AUTO-RTO: 0 /100 WBC (ref 0–0.2)
PH UR STRIP.AUTO: <=5 [PH] (ref 5–8)
PLATELET # BLD AUTO: 264 10*3/MM3 (ref 140–450)
PMV BLD AUTO: 10.2 FL (ref 6–12)
POTASSIUM SERPL-SCNC: 4.5 MMOL/L (ref 3.5–5.2)
PROT SERPL-MCNC: 7.2 G/DL (ref 6–8.5)
PROT UR QL STRIP: NEGATIVE
RBC # BLD AUTO: 4.09 10*6/MM3 (ref 3.77–5.28)
SODIUM SERPL-SCNC: 139 MMOL/L (ref 136–145)
SP GR UR STRIP: 1.01 (ref 1–1.03)
UROBILINOGEN UR QL STRIP: NORMAL
WBC NRBC COR # BLD AUTO: 7.15 10*3/MM3 (ref 3.4–10.8)
WHOLE BLOOD HOLD COAG: NORMAL
WHOLE BLOOD HOLD SPECIMEN: NORMAL

## 2024-05-07 PROCEDURE — 81003 URINALYSIS AUTO W/O SCOPE: CPT

## 2024-05-07 PROCEDURE — 96372 THER/PROPH/DIAG INJ SC/IM: CPT

## 2024-05-07 PROCEDURE — 25010000002 METHYLNALTREXONE 12 MG/0.6ML SOLUTION: Performed by: EMERGENCY MEDICINE

## 2024-05-07 PROCEDURE — 74177 CT ABD & PELVIS W/CONTRAST: CPT

## 2024-05-07 PROCEDURE — 83690 ASSAY OF LIPASE: CPT

## 2024-05-07 PROCEDURE — 83605 ASSAY OF LACTIC ACID: CPT

## 2024-05-07 PROCEDURE — 80053 COMPREHEN METABOLIC PANEL: CPT

## 2024-05-07 PROCEDURE — 96374 THER/PROPH/DIAG INJ IV PUSH: CPT

## 2024-05-07 PROCEDURE — 85025 COMPLETE CBC W/AUTO DIFF WBC: CPT

## 2024-05-07 PROCEDURE — 99285 EMERGENCY DEPT VISIT HI MDM: CPT

## 2024-05-07 PROCEDURE — 25010000002 HYDROMORPHONE 1 MG/ML SOLUTION: Performed by: EMERGENCY MEDICINE

## 2024-05-07 PROCEDURE — 25510000001 IOPAMIDOL PER 1 ML

## 2024-05-07 RX ORDER — SODIUM CHLORIDE 0.9 % (FLUSH) 0.9 %
10 SYRINGE (ML) INJECTION AS NEEDED
Status: DISCONTINUED | OUTPATIENT
Start: 2024-05-07 | End: 2024-05-08 | Stop reason: HOSPADM

## 2024-05-07 RX ADMIN — IOPAMIDOL 100 ML: 755 INJECTION, SOLUTION INTRAVENOUS at 21:16

## 2024-05-07 RX ADMIN — DOCUSATE SODIUM 1 ENEMA: 283 LIQUID RECTAL at 23:21

## 2024-05-07 RX ADMIN — HYDROMORPHONE HYDROCHLORIDE 0.5 MG: 1 INJECTION, SOLUTION INTRAMUSCULAR; INTRAVENOUS; SUBCUTANEOUS at 19:35

## 2024-05-07 RX ADMIN — METHYLNALTREXONE BROMIDE 6 MG: 12 INJECTION, SOLUTION SUBCUTANEOUS at 23:12

## 2024-05-07 NOTE — ED NOTES
Pt to ed from home with [facility] with c/o lower abd pain, rectal pain, and vaginal pain. 20 left forearm. Started 4pm. Pt having hard bowel movements no dysuria. Hx bowel prolapse. 100 fentanyl and 4 zofran given with ems.

## 2024-05-08 NOTE — DISCHARGE INSTRUCTIONS
Increase your daily fluid intake.  Increase your daily dietary fiber.  Consider taking something such as MiraLAX daily.  Drink a bottle of magnesium citrate in the morning and effort to have his substantial bowel movement.  After you have a large bowel movement resume normal diet.  Minimize use of your pain medication as this is likely causing your current chronic constipation.  Follow-up with your primary care provider in 3 days if symptoms or not improving.  Return to the ER for change or worsening abdominal pain, fever greater than 101, repetitive vomiting, rectal bleeding, or any other concerns issues that may arise.

## 2024-05-08 NOTE — ED PROVIDER NOTES
"Time: 11:43 PM EDT  Date of encounter:  5/7/2024  Independent Historian/Clinical History and Information was obtained by:   Patient and   Chief Complaint: Abdominal and rectal pain    History is limited by: N/A    History of Present Illness:  Patient is a 83 y.o. year old female who presents to the emergency department for evaluation of abdominal and rectal pain.  Patient complains of \"severe pain\".  Patient states the pain began around 4 PM but is progressively gotten worse.  Patient states that most of her pain is in her rectum.  Patient reports a history of constipation.  Patient reports that she did have a bowel movement today but it was small and very hard stools.  Patient denies any vomiting.    HPI    Patient Care Team  Primary Care Provider: Owen Mathias MD    Past Medical History:     No Known Allergies  Past Medical History:   Diagnosis Date    Anxiety     Arthritis     CHF (congestive heart failure)     NO RECENT ISSUES    Chronic pain     NECK AND LOW BACK    Constipation     Disease of thyroid gland     Fibromyalgia     Hemorrhoids     History of acute respiratory failure     10/2023    History of pulmonary embolus (PE)     ON ELIQUIS    History of sepsis     10/2023    Hyperlipidemia     Hypertension     Lactic acidosis     Pain in back     SPONDYLOSIS, LUMBAR, PAIN [facility] CL[facility]IC    Restless leg syndrome     Shoulder pain, bilateral      Past Surgical History:   Procedure Laterality Date    BLADDER REPAIR      COLON SURGERY      REPAIR FROM HOLE IN COLON    COLONOSCOPY      ELBOW PROCEDURE Left     MSSA  WOUND, COMPLETLY RESOLVED    EXCISION LESION Right     MAXILARY GLAND X2    FOOT SURGERY Bilateral     TARSAL TUNNEL SURGERY    GALLBLADDER SURGERY      HYSTERECTOMY      RECTAL PROLAPSE REPAIR      2019 IN [place]    RECTOPEXY N/A 10/14/2022    Procedure: ROBOT ASSISTED  RECTOPEXY;  Surgeon: Navjot Diaz MD;  Location: [facility] OR [facility] [place];  Service: Robotics - [facility];  Laterality: " N/A;    SHOULDER SURGERY Right     RCR X2    TOTAL SHOULDER ARTHROPLASTY W/ DISTAL CLAVICLE EXCISION Right 3/7/2024    Procedure: TOTAL SHOULDER REVERSE ARTHROPLASTY;  Surgeon: Sukhwinder Hightower MD;  Location: [facility] OR [facility] [place];  Service: Orthopedics;  Laterality: Right;     Family History   Problem Relation Age of Onset    Stroke Sister     Hypertension Neg Hx     Diabetes Neg Hx     Cancer Neg Hx     Malig Hyperthermia Neg Hx        Home Medications:  Prior to Admission medications    Medication Sig Start Date End Date Taking? Authorizing Provider   apixaban (ELIQUIS) 5 MG tablet tablet Take 1 tablet by mouth 2 (Two) Times a Day. INSTRUCTED PT TO FOLLOW MD INSTRUCTIONS REGARDING HOLDING FOR SURGERY   Yes Kevin Wiggins MD   albuterol sulfate  (90 Base) MCG/ACT inhaler Inhale 2 puffs Every 6 (Six) Hours As Needed for Shortness of Air. 8/25/22   Bahman Douglass MD   bumetanide (BUMEX) 1 MG tablet Take 2 tablets by mouth Daily As Needed. 4/3/23   Kevin Wiggins MD   buPROPion XL (WELLBUTRIN XL) 300 MG 24 hr tablet Take 1 tablet by mouth Every Morning. 8/1/23   Kevin Wiggins MD   carvedilol (COREG) 3.125 MG tablet Take 1 tablet by mouth 2 (Two) Times a Day With Meals. INST PER ANESTHESIA PROTOCOL 9/2/20   Kevin Wiggins MD   levothyroxine (SYNTHROID, LEVOTHROID) 25 MCG tablet Take 1 tablet by mouth Every Morning.    Kevin Wiggins MD   Magnesium 500 MG capsule Take 1 capsule by mouth Daily. LAST DOSE 9/15/22    Kevin Wiggins MD   MULTIPLE VITAMINS-MINERALS PO Take  by mouth. HOLD PRIOR TO SURG    Kevin Wiggins MD   naloxone (NARCAN) 4 MG/0.1ML nasal spray Call 911. Don't prime. [place] in 1 nostril for overdose. Repeat in 2-3 minutes in other nostril if no or minimal breathing/responsiveness. 3/7/24   Sukhwinder Hightower MD   Omega-3 Fatty Acids (fish oil) 1000 MG capsule capsule Take 1 capsule by mouth Daily With Breakfast. HOLD FOR SURGERY    Provider  MD Kevin   ondansetron ODT (ZOFRAN-ODT) 4 MG disintegrating tablet Place 1 tablet on the tongue Every 6 (Six) Hours As Needed for Nausea or Vomiting. 3/7/24   Sukhwinder Hightower MD   oxyCODONE-acetaminophen (PERCOCET)  MG per tablet Take 0.5 tablets by mouth Every 6 (Six) Hours As Needed (Chronic back pain). 9/24/23   ProviderKevin MD   oxyCODONE-acetaminophen (PERCOCET)  MG per tablet Take 1 tablet by mouth Every 4 (Four) Hours As Needed for Moderate Pain. 3/7/24   Sukhwinder Hightower MD   Plecanatide (Trulance) 3 MG tablet Take 1 tablet by mouth Every Night.    Emergency, Nurse Epic, RN   polyethylene glycol (MIRALAX) 17 g packet Take 17 g by mouth Daily. 10/14/22   Navjot Diaz MD   rOPINIRole (REQUIP) 3 MG tablet Take 1 tablet by mouth Every Night. 5/15/22   ProviderKevin MD   simvastatin (ZOCOR) 40 MG tablet Take 1 tablet by mouth Every Night. 8/21/21   Emergency, Nurse Leonard, RN        Social History:   Social History     Tobacco Use    Smoking status: Never     Passive exposure: Past    Smokeless tobacco: Never   Vaping Use    Vaping status: Never Used   Substance Use Topics    Alcohol use: Never    Drug use: Never         Review of Systems:  Review of Systems   Constitutional:  Negative for chills and fever.   HENT:  Negative for congestion, ear pain and sore throat.    Eyes:  Negative for pain.   Respiratory:  Negative for cough, chest tightness and shortness of breath.    Cardiovascular:  Negative for chest pain.   Gastrointestinal:  Positive for abdominal pain, constipation and rectal pain. Negative for diarrhea, nausea and vomiting.   Genitourinary:  Negative for flank pain and hematuria.   Musculoskeletal:  Negative for joint swelling.   Skin:  Negative for pallor.   Neurological:  Negative for seizures and headaches.   All other systems reviewed and are negative.       Physical Exam:  /76 (BP Location: Right arm, Patient Position: Standing)   Pulse 79    "Temp 98 °F (36.7 °C) (Oral)   Resp 20   Ht 162.6 cm (64\")   Wt 86.9 kg (191 lb 9.3 oz)   SpO2 91%   BMI 32.88 kg/m²     Physical Exam    Vital signs were reviewed under triage note.  General appearance - Patient appears well-developed and well-nourished.  Patient is in no acute distress.  Head - Normocephalic, atraumatic.  Pupils - Equal, round, reactive to light.  Extraocular muscles are intact.  Conjunctiva is clear.  Nasal - Normal inspection.  No evidence of trauma or epistaxis.  Tympanic membranes - Gray, intact without erythema or retractions.  Oral mucosa - Pink and moist without lesions or erythema.  Uvula is midline.  Chest wall - Atraumatic.  Chest wall is nontender.  There are no vesicular rashes noted.  Neck - Supple.  Trachea was midline.  There is no palpable lymphadenopathy or thyromegaly.  There are no meningeal signs  Lungs - Clear to auscultation and percussion bilaterally.  Heart - Regular rate and rhythm without any murmurs, clicks, or gallops.  Abdomen - Soft.  Bowel sounds are present.  There is no palpable tenderness.  There is no rebound, guarding, or rigidity.  There are no palpable masses.  There are no pulsatile masses.  Rectal - I was chaperoned by ED Maya saleh.  There is no external lesions notified.  Patient had normal sphincter tone.  There is no stool within reach of the rectal vault.  Back - Spine is straight and midline.  There is no CVA tenderness.  Extremities - Intact x4 with full range of motion.  There is no palpable edema.  Pulses are intact x4 and equal.  Neurologic - Patient is awake, alert, and oriented x3.  Cranial nerves II through XII are grossly intact.  Motor and sensory functions grossly intact.  Cerebellar function was normal.  Integument - There are no rashes.  There are no petechia or purpura lesions noted.  There are no vesicular lesions noted.          Procedures:  Procedures      Medical Decision Making:      Comorbidities that affect " care:    Rectal prolapse, fibromyalgia, CHF, hypertension, restless leg syndrome, hyperlipidemia, constipation, chronic opioid use, chronic pain, anxiety, hypothyroidism, PE    External Notes reviewed:    Hospital Discharge Summary: Hospital discharge summary on 3/7/2024 was reviewed by me.      The following orders were placed and all results were independently analyzed by me:  Orders Placed This Encounter   Procedures    CT Abdomen Pelvis With Contrast    [place] Draw    Comprehensive Metabolic Panel    Lipase    Urinalysis With Microscopic If Indicated (No Culture) - Urine, Clean Catch    Lactic Acid, Plasma    CBC Auto Differential    NPO Diet NPO Type: Strict NPO    Undress & Gown    Insert Peripheral IV    CBC & Differential    Green Top (Gel)    Lavender Top    Gold Top - SST    Light Blue Top       Medications Given in the Emergency Department:  Medications   sodium chloride 0.9 % flush 10 mL (has no administration in time range)   docusate sodium (ENEMEEZ) enema 1 enema (1 enema Rectal Given 5/7/24 2321)   HYDROmorphone (DILAUDID) injection 0.5 mg (0.5 mg Intravenous Given 5/7/24 1935)   iopamidol (ISOVUE-370) 76 % injection 100 mL (100 mL Intravenous Given 5/7/24 2116)   methylnaltrexone (RELISTOR) injection 6 mg (6 mg Subcutaneous Given 5/7/24 2312)        ED Course:     The patient was seen and evaluated in the ED by me.  The above history and physical examination was performed as documented.  Diagnostic data was obtained.  Results reviewed.  CT did not show any acute intra-abdominal pathology or obstruction.  Patient is noted to be constipated which correlates with the patient's history.  The patient initially declined an enema and then agreed to one.  Patient declined a second 1.  Patient states that she wants to take magnesium citrate which she has taken in the past with good results.  Patient will be discharged home with outpatient treatment with instructions return to the ER for any change or  worsening of her symptoms.    Labs:    Lab Results (last 24 hours)       Procedure Component Value Units Date/Time    Urinalysis With Microscopic If Indicated (No Culture) - Urine, Clean Catch [[id number]]  (Normal) Collected: 05/07/24 1850    Specimen: Urine, Clean Catch Updated: 05/07/24 1906     Color, UA Yellow     Appearance, UA Clear     pH, UA <=5.0     Specific Gravity, UA 1.008     Glucose, UA Negative     Ketones, UA Negative     Bilirubin, UA Negative     Blood, UA Negative     Protein, UA Negative     Leuk Esterase, UA Negative     Nitrite, UA Negative     Urobilinogen, UA 0.2 E.U./dL    Narrative:      Urine microscopic not indicated.    CBC & Differential [[id number]]  (Normal) Collected: 05/07/24 1902    Specimen: Blood Updated: 05/07/24 1910    Narrative:      The following orders were created for panel order CBC & Differential.  Procedure                               Abnormality         Status                     ---------                               -----------         ------                     CBC Auto Differential[[id number]]        Normal              Final result                 Please view results for these tests on the individual orders.    Comprehensive Metabolic Panel [[id number]]  (Abnormal) Collected: 05/07/24 1902    Specimen: Blood Updated: 05/1942     Glucose 89 mg/dL      BUN 31 mg/dL      Creatinine 1.18 mg/dL      Sodium 139 mmol/L      Potassium 4.5 mmol/L      Chloride 102 mmol/L      CO2 25.0 mmol/L      Calcium 10.1 mg/dL      Total Protein 7.2 g/dL      Albumin 4.4 g/dL      ALT (SGPT) 12 U/L      AST (SGOT) 16 U/L      Alkaline Phosphatase 84 U/L      Total Bilirubin 0.8 mg/dL      Globulin 2.8 gm/dL      A/G Ratio 1.6 g/dL      BUN/Creatinine Ratio 26.3     Anion Gap 12.0 mmol/L      eGFR 46.5 mL/min/1.73     Narrative:      GFR Normal >60  Chronic Kidney Disease <60  Kidney Failure <15    The GFR formula is only valid for adults with stable renal function between ages  18 and 70.    Lipase [[id number]]  (Normal) Collected: 05/07/24 1902    Specimen: Blood Updated: 05/1942     Lipase 32 U/L     Lactic Acid, Plasma [[id number]]  (Normal) Collected: 05/07/24 1902    Specimen: Blood Updated: 05/1942     Lactate 1.2 mmol/L     CBC Auto Differential [[id number]]  (Normal) Collected: 05/07/24 1902    Specimen: Blood Updated: 05/07/24 1910     WBC 7.15 10*3/mm3      RBC 4.09 10*6/mm3      Hemoglobin 12.3 g/dL      Hematocrit 37.8 %      MCV 92.4 fL      MCH 30.1 pg      MCHC 32.5 g/dL      RDW 12.7 %      RDW-SD 43.0 fl      MPV 10.2 fL      Platelets 264 10*3/mm3      Neutrophil % 61.4 %      Lymphocyte % 22.5 %      Monocyte % 10.2 %      Eosinophil % 4.6 %      Basophil % 1.0 %      Immature Grans % 0.3 %      Neutrophils, Absolute 4.39 10*3/mm3      Lymphocytes, Absolute 1.61 10*3/mm3      Monocytes, Absolute 0.73 10*3/mm3      Eosinophils, Absolute 0.33 10*3/mm3      Basophils, Absolute 0.07 10*3/mm3      Immature Grans, Absolute 0.02 10*3/mm3      nRBC 0.0 /100 WBC              Imaging:    CT Abdomen Pelvis With Contrast    Result Date: 5/7/2024  CT ABDOMEN PELVIS W CONTRAST-  Date of Exam: 5/7/2024 9:05 PM  Indication: Unspecified abd. pain; rectal pain.  Comparison: None available.  Technique: Axial CT images were obtained of the abdomen and pelvis following the uneventful intravenous administration of 100 mL of Isovue-370. Reconstructed 2D coronal and sagittal images were also obtained. Automated exposure control and iterative construction methods were used.  Findings: There is a large stool burden. Constipation is possible. No fecal impaction is suggested. Pelvic floor reconstructive changes are suggested. There may have been prior surgical repair of a rectal prolapse. Please correlate clinically (especially as to whether or not there is a suspected recurrence). There are colonic diverticula without acute diverticulitis. The appendix is thought to be surgically absent.  Please correlate with the surgical history. The patient has undergone hysterectomy and cholecystectomy. Compensatory dilatation involves the biliary tree is seen. A Chila[place]iti configuration of the hepatic flexure of colon is noted. Redundant ascending colon is also interposed between liver and right kidney. There is renal cortical scarring bilaterally. Mild prominence of the bilateral extrarenal pelves is seen. No nephrolithiasis or ureterolithiasis. There are small renal cysts. They are probably too small to characterize by this study. No cystolithiasis; no urinary bladder wall thickening. No acute pyelonephritis. Severe degenerative changes are seen throughout the imaged spine with chronic malalignment. No definite acute fracture or aggressive osseous lesion is seen. Degenerative changes about the bilateral sacroiliac (SI) joints and the bilateral hip joints. No suspected hepatomegaly or splenomegaly. No acute infiltrate is seen in the partially imaged lung bases. Coronary artery calcifications are seen. Atherosclerotic changes involve the aortoiliac arterial system with ectasia. No aneurysmal dilatation. No acute intraperitoneal or retroperitoneal hemorrhage. No definite ascites.      A large stool burden is seen. Constipation is possible. No fecal impaction is suggested. No mechanical bowel obstruction is suspected. No pneumoperitoneum or pneumatosis. No acute diverticulitis. There are postoperative changes as detailed above. No other definite acute findings are appreciated.    Please note that portions of this note were completed with a voice recognition program.     Electronically Signed By-Bahman Crisostomo MD On:5/7/2024 10:21 PM         Differential Diagnosis and Discussion:    Abdominal Pain: Based on the patient's signs and symptoms, I considered abdominal aortic aneurysm, small bowel obstruction, pancreatitis, acute cholecystitis, acute appendecitis, peptic ulcer disease, gastritis, colitis, endocrine  disorders, irritable bowel syndrome and other differential diagnosis an etiology of the patient's abdominal pain.    All labs were reviewed and interpreted by me.  CT scan radiology impression was interpreted by me.    MDM     Amount and/or Complexity of Data Reviewed  Clinical lab tests: reviewed  Tests in the radiology section of CPT®: reviewed             Patient Care Considerations:    ANTIBIOTICS: I considered prescribing antibiotics as an outpatient however no bacterial focus of infection was found.      Consultants/Shared Management Plan:    None    Social Determinants of Health:    Patient is independent, reliable, and has access to care.       Disposition and Care Coordination:    Discharged: I considered escalation of care by admitting this patient to the hospital, however there were no acute findings that would warrant inpatient admission.    I have explained the patient´s condition, diagnoses and treatment plan based on the information available to me at this time. I have answered questions and addressed any concerns. The patient has a good  understanding of the patient´s diagnosis, condition, and treatment plan as can be expected at this point. The vital signs have been stable. The patient´s condition is stable and appropriate for discharge from the emergency department.      The patient will pursue further outpatient evaluation with the primary care physician or other designated or consulting physician as outlined in the discharge instructions. They are agreeable to this plan of care and follow-up instructions have been explained in detail. The patient has received these instructions in written format and has expressed an understanding of the discharge instructions. The patient is aware that any significant change in condition or worsening of symptoms should prompt an immediate return to this or the closest emergency department or call to 911.    Final diagnoses:   Therapeutic opioid induced  constipation        ED Disposition       ED Disposition   Discharge    Condition   Stable    Comment   --               This medical record created using voice recognition software.             Ha Michelle DO  05/09/24 1025

## 2024-05-14 ENCOUNTER — OFFICE VISIT (OUTPATIENT)
Dept: ORTHOPEDIC SURGERY | Facility: CLINIC | Age: 84
End: 2024-05-14
Payer: MEDICARE

## 2024-05-14 VITALS
DIASTOLIC BLOOD PRESSURE: 80 MMHG | OXYGEN SATURATION: 93 % | HEIGHT: 64 IN | WEIGHT: 191 LBS | SYSTOLIC BLOOD PRESSURE: 136 MMHG | HEART RATE: 81 BPM | BODY MASS INDEX: 32.61 KG/M2

## 2024-05-14 DIAGNOSIS — M17.11 PRIMARY OSTEOARTHRITIS OF RIGHT KNEE: Primary | ICD-10-CM

## 2024-05-14 DIAGNOSIS — M17.12 PRIMARY OSTEOARTHRITIS OF LEFT KNEE: ICD-10-CM

## 2024-05-14 RX ORDER — BUSPIRONE HYDROCHLORIDE 7.5 MG/1
TABLET ORAL
COMMUNITY
Start: 2024-05-07

## 2024-05-14 RX ADMIN — TRIAMCINOLONE ACETONIDE 40 MG: 40 INJECTION, SUSPENSION INTRA-ARTICULAR; INTRAMUSCULAR at 08:41

## 2024-05-14 RX ADMIN — LIDOCAINE HYDROCHLORIDE 5 ML: 10 INJECTION, SOLUTION INFILTRATION; PERINEURAL at 08:41

## 2024-05-14 NOTE — PROGRESS NOTES
"Chief Complaint  Follow-up of the Left Knee and Initial Evaluation of the Right Knee    Subjective      Chula Adkins presents to [facility] for follow up of her left knee and initial evaluation of her right knee. She has left knee pain osteoarthritis that she has been treating conservatively with intermittent injections. Her last steroid injection into her left knee was on 8/31/2023.  She presents today independently ambulatory without use of assistive device.  States that her knees have \"gotten so bad\".  Reports that her laundry is in her basement and knees have been so painful lately that she has been walking down stairs to the basement and waiting through the entire laundry cycle before returning upstairs so she does not have to make an extra trip.  She would like to proceed with repeat injection in office today.    No Known Allergies    Objective     Vital Signs:   Vitals:    05/14/24 0838   BP: 136/80   Pulse: 81   SpO2: 93%   Weight: 86.6 kg (191 lb)   Height: 162.6 cm (64\")     Body mass index is 32.79 kg/m².    I reviewed the patient's chief complaint, history of present illness, review of systems, past medical history, surgical history, family history, social history, medications, and allergy list.     REVIEW OF SYSTEMS    Constitutional: Denies fevers, chills, weight loss  Cardiovascular: Denies chest pain, shortness of breath  Skin: Denies rashes, acute skin changes  Neurologic: Denies headache, loss of consciousness  MSK: Left knee pain.     Ortho Exam  Bilateral lower extremity: Skin is warm, dry, and intact.  There is moderate edema.  Tenderness to palpation the medial lateral joint line.  Palpable osteophytes and crepitus with ROM.  -2 to -5 degrees of extension and flexion to 110-115 degrees.  Full plantarflexion and dorsiflexion of the ankle.  Sensation and distal neurovascular intact.    Large Joint: L knee  Date/Time: 5/14/2024 8:41 AM  Consent given by: " patient  Site marked: site marked  Timeout: Immediately prior to procedure a time out was called to verify the correct patient, procedure, equipment, support staff and site/side marked as required   Supporting Documentation  Indications: pain   Procedure Details  Location: knee - L knee  Preparation: Patient was prepped and draped in the usual sterile fashion  Needle gauge: 21G.  Medications administered: 5 mL lidocaine 1 %; 40 mg triamcinolone acetonide 40 MG/ML  Patient tolerance: patient tolerated the procedure well with no immediate complications      Right knee: R knee  Date/Time: 5/15/2024 8:40 AM  Consent given by: patient  Site marked: site marked  Timeout: Immediately prior to procedure a time out was called to verify the correct patient, procedure, equipment, support staff and site/side marked as required   Supporting Documentation  Indications: pain   Procedure Details  Location: knee - R knee  Needle gauge: 21G.  Medications administered: 5 mL lidocaine 1 %; 40 mg triamcinolone acetonide 40 MG/ML  Patient tolerance: patient tolerated the procedure well with no immediate complications      This injection documentation was Scribed for Marta Christianson PA-C by Nadia Henderson MA.  05/14/24   08:41 EDT       Imaging Results (Most Recent)       Procedure Component Value Units Date/Time    XR Knee 3 View Right [[id number]] Resulted: 05/15/24 0831     Updated: 05/15/24 0832    Narrative:      X-Ray Report:  Study: X-rays ordered, taken in the office, and reviewed today.   Site: Right knee Xray  Indication: Pain  View: AP/Lateral, Standing, and [place] view(s)  Findings: At least grade 3 arthritis of the right knee with significant   joint space narrowing in the medial compartment.  Advanced osteophyte   formation present.  Prior studies available for comparison: no               Assessment and Plan   Diagnoses and all orders for this visit:    1. Primary osteoarthritis of right knee (Primary)  -     XR Knee 3 View  Right    2. Primary osteoarthritis of left knee  -     Large Joint: L knee    Other orders  -     Right knee: R knee      Tobacco Use: Low Risk  (5/14/2024)    Patient History     Smoking Tobacco Use: Never     Smokeless Tobacco Use: Never     Passive Exposure: Past     Patient reports that they are a nonsmoker; cessation education not applicable.     Follow Up   No follow-ups on file.  Patient Instructions   X-rays of the right knee taken and reviewed.  Patient is a candidate for TKA.  She is not interested in surgery at present.  Did discuss options for conservative management including referral to physical therapy, participation in home exercise program, topical or oral NSAIDs, steroid injections, or viscosupplementation.    Bilateral knee steroid injections administered in office today.  Advised on 3 months duration between injections.      Will seek insurance authorization for viscosupplementation.  Follow-up upon [employer] approval.  Call with changes or concerns.  Patient was given instructions and counseling regarding her condition or for health maintenance advice. Please see specific information pulled into the AVS if appropriate.     Dictated Utilizing Dragon Dictation. Please note that portions of this note were completed with a voice recognition program. Part of this note may be an electronic transcription/translation of spoken language to printed text using the Dragon Dictation System.

## 2024-05-15 RX ORDER — TRIAMCINOLONE ACETONIDE 40 MG/ML
40 INJECTION, SUSPENSION INTRA-ARTICULAR; INTRAMUSCULAR
Status: COMPLETED | OUTPATIENT
Start: 2024-05-14 | End: 2024-05-14

## 2024-05-15 RX ORDER — TRIAMCINOLONE ACETONIDE 40 MG/ML
40 INJECTION, SUSPENSION INTRA-ARTICULAR; INTRAMUSCULAR
Status: COMPLETED | OUTPATIENT
Start: 2024-05-15 | End: 2024-05-15

## 2024-05-15 RX ORDER — LIDOCAINE HYDROCHLORIDE 10 MG/ML
5 INJECTION, SOLUTION INFILTRATION; PERINEURAL
Status: COMPLETED | OUTPATIENT
Start: 2024-05-14 | End: 2024-05-14

## 2024-05-15 RX ORDER — LIDOCAINE HYDROCHLORIDE 10 MG/ML
5 INJECTION, SOLUTION INFILTRATION; PERINEURAL
Status: COMPLETED | OUTPATIENT
Start: 2024-05-15 | End: 2024-05-15

## 2024-05-15 RX ADMIN — LIDOCAINE HYDROCHLORIDE 5 ML: 10 INJECTION, SOLUTION INFILTRATION; PERINEURAL at 08:40

## 2024-05-15 RX ADMIN — TRIAMCINOLONE ACETONIDE 40 MG: 40 INJECTION, SUSPENSION INTRA-ARTICULAR; INTRAMUSCULAR at 08:40

## 2024-05-15 NOTE — PATIENT INSTRUCTIONS
X-rays of the right knee taken and reviewed.  Patient is a candidate for TKA.  She is not interested in surgery at present.  Did discuss options for conservative management including referral to physical therapy, participation in home exercise program, topical or oral NSAIDs, steroid injections, or viscosupplementation.    Bilateral knee steroid injections administered in office today.  Advised on 3 months duration between injections.      Will seek insurance authorization for viscosupplementation.  Follow-up upon [employer] approval.  Call with changes or concerns.

## 2024-05-17 ENCOUNTER — TELEPHONE (OUTPATIENT)
Dept: ORTHOPEDIC SURGERY | Facility: CLINIC | Age: 84
End: 2024-05-17
Payer: MEDICARE

## 2024-05-17 NOTE — TELEPHONE ENCOUNTER
Zina Godwin, Mikaela, Roslyn Rep  No PA Required for Bilateral Knee Monovisc.  Okay to schedule when appropriate.          Comments    SENT MSG THRU  MY CHART:  5-17-24       YOUR APPT WILL BE ON:     DATE:   7-2-24   TIME:   8:30 AM   REASON:  RODRI KNEE MONOVISC INJECTION   PROVIDER:  CARLIE SMYHT NP   ADDRESS:  [address] [place] [address]       LAST RODRI KNEE STEROID INJ:  5-14-24       [employer] MEDICARE PPO       IF YOU HAVE ANY QUESTIONS REGARDING YOUR APPOINTMENT, PLEASE CALL US [phone].  THANKS

## 2024-05-21 ENCOUNTER — APPOINTMENT (OUTPATIENT)
Dept: WOMENS IMAGING | Facility: HOSPITAL | Age: 84
End: 2024-05-21
Payer: MEDICARE

## 2024-05-21 PROCEDURE — 77067 SCR MAMMO BI INCL CAD: CPT | Performed by: RADIOLOGY

## 2024-05-21 PROCEDURE — 77063 BREAST TOMOSYNTHESIS BI: CPT | Performed by: RADIOLOGY

## 2024-07-01 NOTE — PROGRESS NOTES
"Chief Complaint  Pain and Follow-up of the Left Knee and Pain and Follow-up of the Right Knee    Subjective      Chula Adkins presents to [facility] for follow up of her bilateral knees.  Patient has bilateral knee pain and osteoarthritis that she has been treating conservatively.  She states she got mild improvement from previous steroid injections that were administered in office on 5/14/2024.  Patient obtained insurance authorization for viscosupplementation for bilateral Monovisc injections.  Patient would like to proceed with bilateral injections today.  She states right now her right knee is the worst.  She states that she has a crunching pain and has been having to go up and down stairs more frequently lately.    No Known Allergies    Objective     Vital Signs:   Vitals:    07/02/24 0829   BP: 132/78   Pulse: 77   SpO2: 91%   Weight: 86.9 kg (191 lb 9.3 oz)   Height: 162.6 cm (64\")     Body mass index is 32.88 kg/m².    I reviewed the patient's chief complaint, history of present illness, review of systems, past medical history, surgical history, family history, social history, medications, and allergy list.     REVIEW OF SYSTEMS    Constitutional: Denies fevers, chills, weight loss  Cardiovascular: Denies chest pain, shortness of breath  Skin: Denies rashes, acute skin changes  Neurologic: Denies headache, loss of consciousness  MSK: Bilateral knee pain.     Ortho Exam  Bilateral lower extremities tender to palpation over medial joint line.  Mild knee effusion bilaterally.  Knee extension 0 degrees.  Knee flexion 120 degrees.  Knee stable to varus valgus stress.  Calf soft, nontender.  Demonstrates active ankle dorsiflexion and plantarflexion.  Sensation intact.  Novastan intact.  Palpable pedal pulses.    Large Joint Arthrocentesis: R knee  Date/Time: 7/2/2024 8:30 AM  Consent given by: patient  Site marked: site marked  Timeout: Immediately prior to procedure a time " out was called to verify the correct patient, procedure, equipment, support staff and site/side marked as required   Supporting Documentation  Indications: pain   Procedure Details  Location: knee - R knee  Preparation: Patient was prepped and draped in the usual sterile fashion  Needle gauge: 21 G.  Approach: lateral  Medications administered: 88 mg Hyaluronan 88 MG/4ML  Patient tolerance: patient tolerated the procedure well with no immediate complications      Large Joint Arthrocentesis: L knee  Date/Time: 7/2/2024 8:31 AM  Consent given by: patient  Site marked: site marked  Timeout: Immediately prior to procedure a time out was called to verify the correct patient, procedure, equipment, support staff and site/side marked as required   Supporting Documentation  Indications: pain   Procedure Details  Location: knee - L knee  Preparation: Patient was prepped and draped in the usual sterile fashion  Needle gauge: 21 G.  Approach: lateral  Medications administered: 88 mg Hyaluronan 88 MG/4ML  Patient tolerance: patient tolerated the procedure well with no immediate complications       This injection documentation was Scribed for KATHY Silverio by Christine Pierre.  07/02/24   08:32 EDT      Imaging Results (Most Recent)       None                Assessment and Plan   Diagnoses and all orders for this visit:    1. Primary osteoarthritis of right knee (Primary)    2. Primary osteoarthritis of left knee    3. Chronic pain of both knees    Other orders  -     Large Joint Arthrocentesis: R knee  -     Large Joint Arthrocentesis: L knee         Chula Adkins presents today to [facility] for the follow up of their bilateral knees. They have bilateral knee pain osteoarthritis that we have been treating conservatively with intermittent injections.     We discussed the risks and benefits with the patient regarding bilateral knee gel injection. Patient understood and elected to proceed.  Bilateral knee  Monovisc gel injection given in office today. Patient tolerated injection well with no complications.     Patient is eligible for repeat gel injection in 6 months.  Discussed other injections in the future, if little/no relief. Patient is eligible for steroid injection, if needed, in 6 weeks. However, if patient is doing well, encouraged patient to continue home exercises for maintaining mobility and strength.     Patient is eligible for bilateral knee steroid injections on or after August 14.    Follow up in 6 weeks.       Follow Up   No follow-ups on file.  There are no Patient Instructions on file for this visit.  Patient was given instructions and counseling regarding her condition or for health maintenance advice. Please see specific information pulled into the AVS if appropriate.       Dictated Utilizing Dragon Dictation. Please note that portions of this note were completed with a voice recognition program. Part of this note may be an electronic transcription/translation of spoken language to printed text using the Dragon Dictation System.

## 2024-07-02 ENCOUNTER — OFFICE VISIT (OUTPATIENT)
Dept: ORTHOPEDIC SURGERY | Facility: CLINIC | Age: 84
End: 2024-07-02
Payer: MEDICARE

## 2024-07-02 VITALS
BODY MASS INDEX: 32.71 KG/M2 | DIASTOLIC BLOOD PRESSURE: 78 MMHG | HEIGHT: 64 IN | WEIGHT: 191.58 LBS | OXYGEN SATURATION: 91 % | SYSTOLIC BLOOD PRESSURE: 132 MMHG | HEART RATE: 77 BPM

## 2024-07-02 DIAGNOSIS — M25.562 CHRONIC PAIN OF BOTH KNEES: ICD-10-CM

## 2024-07-02 DIAGNOSIS — M25.561 CHRONIC PAIN OF BOTH KNEES: ICD-10-CM

## 2024-07-02 DIAGNOSIS — G89.29 CHRONIC PAIN OF BOTH KNEES: ICD-10-CM

## 2024-07-02 DIAGNOSIS — M17.12 PRIMARY OSTEOARTHRITIS OF LEFT KNEE: ICD-10-CM

## 2024-07-02 DIAGNOSIS — M17.11 PRIMARY OSTEOARTHRITIS OF RIGHT KNEE: Primary | ICD-10-CM

## 2024-07-02 PROCEDURE — 1159F MED LIST DOCD IN RCRD: CPT

## 2024-07-02 PROCEDURE — 1160F RVW MEDS BY RX/DR IN RCRD: CPT

## 2024-07-02 PROCEDURE — 20610 DRAIN/INJ JOINT/BURSA W/O US: CPT

## 2024-07-02 RX ORDER — OXYCODONE AND ACETAMINOPHEN 10; 325 MG/1; MG/1
TABLET ORAL EVERY 8 HOURS SCHEDULED
COMMUNITY
Start: 2024-06-27

## 2024-07-30 NOTE — H&P
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 She enjoys being out [place] her [place] and picking berries and doing other activities and the pain that she is having is getting in the way of these activities. She has had previous corticosteroid injection management and physical therapy and she  Physical Exam  Left shoulder:  Skin is normal. There is no warmth. No erythema.  Lymphadenopathy is negative.  Shoulder passive ROM today shows: Elevation = 120; ER(side) = 20; ER(abd) = 60; IR(abd) = 20; IR(vert) = pocket.  Shoulder strength: Elevation = 3/5; ER = 3/5; IR = 5-/5; ABD = 4/5.  Crepitation in the glenohumeral joint. Arc of motion is positive with passive range of motion.  Pulses are normal. Normal sensation. Capillary refill is normal.  Tender over the anterior and posterior glenohumeral joint to palpation.  Increased pain with extended passive rotational movement  Assessment / Plan  Three-view x-ray left shoulder shows bone-on-bone glenohumeral osteoarthritis with large inferior humeral osteophytic spur.    Assessment:  1. Left primary glenohumeral osteoarthritis  2. Likely left shoulder rotator cuff dysfunction    Plan:  1. She has failed a prolonged course of conservative care. She is healthy and has done well with her right reverse total shoulder arthroplasty.  2. I recommend CT scan left shoulder [employer] protocol for preoperative planning and intraoperative computer guidance.  3. I recommend scheduling left reverse total shoulder arthroplasty with computer navigation.  4. We discussed the benefits and risks of surgical intervention, as well as alternative treatments. Potential surgical risks and complications include but are not  limited to deep venous thrombosis, infection, neurovascular injury, fracture, implant wear, implant failure, implant dislocation, possible need for revision surgery, loss of motion, limb length changes. Sufficient opportunity was given to discuss the condition and treatment plan and all questions were answered for the patient. The patient agreed to proceed with the surgical plan.  5. This patient will experience restricted and limited mobility post-surgically. A Caprini DVT Risk Assessment for development of deep vein thrombosis (DVT) during the first 30 days of the post-procedural period finds this patient at high risk. Based on the patient´s limited mobility following surgery, and past medical history and post-op risk of DVT, I am ordering home-use Portable Mechanical Compression devices (PMC) to stimulate circulation, decrease swelling, and reduce the likelihood of a VTE event. I have prescribed Portable Mechanical Compression devices for home-use for a period of 30 days post-surgery. I find this to be medically necessary to limit any additional risk of complications and bleeding.  Portable Mechanical Compression devices applied to the lower extremities will be used 3 hours QD and any time the patient is at rest, including during bed rest.    The patient understands these are provided in lieu of or in conjunction with a chemoprophylaxis, as the use of heparin-derived chemoprophylaxis for deep vein thrombosis (DVT) following medical procedures is associated with significantly increased risks of bleeding complications, which is considered a contraindication in these procedures. The use of a PMC device kit is a proven alternative which safely increases volumetric venous blood flow and decreases the rate of post-procedure VTE/DVT/PE and will be post-surgically applied without delay for this patient.

## 2024-08-01 ENCOUNTER — HOSPITAL ENCOUNTER (OUTPATIENT)
Dept: GENERAL RADIOLOGY | Facility: HOSPITAL | Age: 84
Discharge: HOME | End: 2024-08-01
Payer: MEDICARE

## 2024-08-01 ENCOUNTER — PRE-ADMISSION TESTING (OUTPATIENT)
Dept: PREADMISSION TESTING | Facility: HOSPITAL | Age: 84
End: 2024-08-01
Payer: MEDICARE

## 2024-08-01 VITALS
RESPIRATION RATE: 20 BRPM | WEIGHT: 201 LBS | HEART RATE: 80 BPM | BODY MASS INDEX: 36.99 KG/M2 | SYSTOLIC BLOOD PRESSURE: 132 MMHG | DIASTOLIC BLOOD PRESSURE: 85 MMHG | OXYGEN SATURATION: 94 % | HEIGHT: 62 IN | TEMPERATURE: 98.1 F

## 2024-08-01 LAB
ALBUMIN SERPL-MCNC: 4.3 G/DL (ref 3.5–5.2)
ALBUMIN/GLOB SERPL: 1.8 G/DL
ALP SERPL-CCNC: 85 U/L (ref 39–117)
ALT SERPL W P-5'-P-CCNC: 15 U/L (ref 1–33)
ANION GAP SERPL CALCULATED.3IONS-SCNC: 8 MMOL/L (ref 5–15)
AST SERPL-CCNC: 17 U/L (ref 1–32)
BILIRUB SERPL-MCNC: 0.4 MG/DL (ref 0–1.2)
BILIRUB UR QL STRIP: NEGATIVE
BUN SERPL-MCNC: 42 MG/DL (ref 8–23)
BUN/CREAT SERPL: 31.1 (ref 7–25)
CALCIUM SPEC-SCNC: 9.7 MG/DL (ref 8.6–10.5)
CHLORIDE SERPL-SCNC: 107 MMOL/L (ref 98–107)
CLARITY UR: CLEAR
CO2 SERPL-SCNC: 27 MMOL/L (ref 22–29)
COLOR UR: YELLOW
CREAT SERPL-MCNC: 1.35 MG/DL (ref 0.57–1)
DEPRECATED RDW RBC AUTO: 43 FL (ref 37–54)
EGFRCR SERPLBLD CKD-EPI 2021: 39.6 ML/MIN/1.73
ERYTHROCYTE [DISTWIDTH] IN BLOOD BY AUTOMATED COUNT: 12.4 % (ref 12.3–15.4)
GLOBULIN UR ELPH-MCNC: 2.4 GM/DL
GLUCOSE SERPL-MCNC: 104 MG/DL (ref 65–99)
GLUCOSE UR STRIP-MCNC: NEGATIVE MG/DL
HCT VFR BLD AUTO: 37 % (ref 34–46.6)
HGB BLD-MCNC: 11.9 G/DL (ref 12–15.9)
HGB UR QL STRIP.AUTO: NEGATIVE
INR PPP: 1.04 (ref 0.9–1.1)
KETONES UR QL STRIP: ABNORMAL
LEUKOCYTE ESTERASE UR QL STRIP.AUTO: NEGATIVE
MCH RBC QN AUTO: 30.1 PG (ref 26.6–33)
MCHC RBC AUTO-ENTMCNC: 32.2 G/DL (ref 31.5–35.7)
MCV RBC AUTO: 93.7 FL (ref 79–97)
NITRITE UR QL STRIP: NEGATIVE
PH UR STRIP.AUTO: 6 [PH] (ref 5–8)
PLATELET # BLD AUTO: 243 10*3/MM3 (ref 140–450)
PMV BLD AUTO: 10 FL (ref 6–12)
POTASSIUM SERPL-SCNC: 5.2 MMOL/L (ref 3.5–5.2)
PROT SERPL-MCNC: 6.7 G/DL (ref 6–8.5)
PROT UR QL STRIP: NEGATIVE
PROTHROMBIN TIME: 13.8 SECONDS (ref 11.7–14.2)
QT INTERVAL: 398 MS
QTC INTERVAL: 442 MS
RBC # BLD AUTO: 3.95 10*6/MM3 (ref 3.77–5.28)
SODIUM SERPL-SCNC: 142 MMOL/L (ref 136–145)
SP GR UR STRIP: 1.02 (ref 1–1.03)
UROBILINOGEN UR QL STRIP: ABNORMAL
WBC NRBC COR # BLD AUTO: 5.64 10*3/MM3 (ref 3.4–10.8)

## 2024-08-01 PROCEDURE — 93005 ELECTROCARDIOGRAM TRACING: CPT

## 2024-08-01 PROCEDURE — 71046 X-RAY EXAM CHEST 2 VIEWS: CPT

## 2024-08-01 PROCEDURE — 85610 PROTHROMBIN TIME: CPT

## 2024-08-01 PROCEDURE — 81003 URINALYSIS AUTO W/O SCOPE: CPT

## 2024-08-01 PROCEDURE — 73030 X-RAY EXAM OF SHOULDER: CPT

## 2024-08-01 PROCEDURE — 85027 COMPLETE CBC AUTOMATED: CPT

## 2024-08-01 PROCEDURE — 93010 ELECTROCARDIOGRAM REPORT: CPT | Performed by: INTERNAL MEDICINE

## 2024-08-01 PROCEDURE — 80053 COMPREHEN METABOLIC PANEL: CPT

## 2024-08-01 PROCEDURE — 36415 COLL VENOUS BLD VENIPUNCTURE: CPT

## 2024-08-01 NOTE — DISCHARGE INSTRUCTIONS
Take the following medications the morning of surgery:    Wellbutrin  levothyroxine  coreg    If you are on prescription narcotic pain medication to control your pain you may also take that medication the morning of surgery.    General Instructions:  Do not eat solid food after midnight the night before surgery.  You may drink clear liquids day of surgery but must stop at least one hour before your hospital arrival time.  It is beneficial for you to have a clear drink that contains carbohydrates the day of surgery.  We suggest a 12 to 20 ounce bottle of Gatorade or Powerade for non-diabetic patients or a 12 to 20 ounce bottle of G2 or Powerade Zero for diabetic patients. (Pediatric patients, are not advised to drink a 12 to 20 ounce carbohydrate drink)    Clear liquids are liquids you can see through.  Nothing red in color.     Plain water                               Sports drinks  Sodas                                   Gelatin (Jell-O)  Fruit juices without pulp such as white grape juice and apple juice  Popsicles that contain no fruit or yogurt  Tea or coffee (no cream or milk added)  Gatorade / Powerade  G2 / Powerade Zero    Chicken / beef / pork / vegetable bullion / cubes or any formulation are not considered clear liquids and are not allowed.    Infants may have breast milk up to four hours before surgery.  Infants drinking formula may drink formula up to six hours before surgery.   Patients who avoid smoking, chewing tobacco and alcohol for 4 weeks prior to surgery have a reduced risk of post-operative complications.  Quit smoking as many days before surgery as you can.  Do not smoke, use chewing tobacco or drink alcohol the day of surgery.   If applicable bring your C-PAP/ BI-PAP machine in with you to preop day of surgery.  Bring any papers given to you in the doctor’s office.  Wear clean comfortable clothes.  Do not wear contact lenses, false eyelashes or make-up.  Bring a case for your glasses.    Bring crutches or walker if applicable.  Remove all piercings.  Leave jewelry and any other valuables at home.  Hair extensions with metal clips must be removed prior to surgery.  The Pre-Admission Testing nurse will instruct you to bring medications if unable to obtain an accurate list in Pre-Admission Testing.        If you were given a blood bank ID arm band remember to bring it with you the day of surgery.    Day of surgery:8/8/2024  Your arrival time is approximately two hours before your scheduled surgery time.  Upon arrival, a Pre-op nurse and Anesthesiologist will review your health history, obtain vital signs, and answer questions you may have.  The only belongings needed at this time will be a list of your home medications and if applicable your C-PAP/BI-PAP machine.  A Pre-op nurse will start an IV and you may receive medication in preparation for surgery, including something to help you relax.     Please be aware that surgery does come with discomfort.  We want to make every effort to control your discomfort so please discuss any uncontrolled symptoms with your nurse.   Your doctor will most likely have prescribed pain medications.      If you are going home after surgery you will receive individualized written care instructions before being discharged.  A responsible adult must drive you to and from the hospital on the day of your surgery and ideally stay with you through the night.  Discharge prescriptions can be filled by the hospital pharmacy during regular pharmacy hours.  If you are having surgery late in the day/evening your prescription may be e-prescribed to your pharmacy.  Please verify your pharmacy hours or chose a 24 hour pharmacy to avoid not having access to your prescription because your pharmacy has closed for the day.    If you are staying overnight following surgery, you will be transported to your hospital room following the recovery period.  [facility] has all  private rooms.    If you have any questions please call Pre-Admission Testing at [phone].  Deductibles and co-payments are collected on the day of service. Please be prepared to pay the required co-pay, deductible or deposit on the day of service as defined by your plan.    Call your surgeon immediately if you experience any of the following symptoms:  Sore Throat  Shortness of Breath or difficulty breathing  Cough  Chills  Body soreness or muscle pain  Headache  Fever  New loss of taste or smell  Do not arrive for your surgery ill.  Your procedure will need to be rescheduled to another time.  You will need to call your physician before the day of surgery to avoid any unnecessary exposure to hospital staff as well as other patients.        PREVENTING INFECTION IN SHOULDER SURGICAL SITES     C. acnes is a bacteria that lives deep within follicles and pores of the skin. It is found in large numbers on the skin of the face, axilla (armpit), chest and back and is the primary bacteria to cause a surgical site infection after shoulder surgery.      Use of a Benzoyl Peroxide solution prior to shoulder surgery decreases C. acnes and reduces post-op infections.   Your surgeon has ordered 5% Benzoyl Peroxide wash to be used three times prior to your surgery.     Please read the following instructions carefully and bring this form with you the day of surgery.     General bathing instructions starting two days before your surgery:    Shower using a fresh bar of anti-bacterial soap (such as Dial) and clean washcloth.  Pay special attention to the neck, shoulder and armpit area.   Wash your hair as usual with your regular shampoo.   Rinse hair and body thoroughly with warm water (not hot water) to remove shampoo and residue.   Dry with a clean towel.              Sleep in a clean bed with clean clothing.  Do not allow pets to sleep with you.     For 2 days before surgery, avoid shaving with a razor because the razor can  irritate skin and make it easier to develop an infection.    Any areas of open skin can increase the risk of a post-operative wound infection by allowing bacteria to enter and travel throughout the body.  Notify your surgeon if you have any skin wounds / rashes even if it is not near the expected surgical site.  The area will need assessed to determine if surgery should be delayed until it is healed.      First application of 5% Benzoyl Peroxide Wash two nights before surgery:                                                                Wash neck, shoulder (front, back, side) and armpit   with warm water, rinse and dry - see picture.  Gently wash the same areas with the Benzoyl Peroxide   cleanser going away from the neck for 10-20 seconds.   Work into a full lather and leave on the skin for   2 minutes for greatest effect.  Rinse thoroughly with warm water, not hot water.                     Pat dry with a clean towel.                                                            Wash your hands thoroughly.  Do not apply lotion, powder, perfume or deodorant.   Put on clean clothes.    Second application the night before surgery:  Repeat the above steps.        Third application morning of surgery:  Repeat the above steps.    Due to shoulder pain or decreased range of motion of your shoulder and arm, you may need assistance washing under the arm or the back portion of your shoulder.     Avoid further washing the areas of the skin treated with Benzoyl Peroxide for at least 1 hour.    For your convenience, you may purchase Benzoyl Peroxide at [facility] retail pharmacy.     Warning:  Let your physician know if you are allergic to Benzoyl Peroxide or have very sensitive skin and cannot use it.   Stop using and contact your surgeon if you experience any excessive scaling, itching, swelling, skin irritation or other signs of a reaction.  Keep out of eyes, ears, nose and mouth.  Do not apply to sunburned, irritated or  broken area on the skin.  Avoid unwanted problems with bleaching effect by following these tips:  Wash hands after each use.  Avoid contact with hair, clothing, furnishings or carpeting.  Wear clean, old t-shirt or clothing to bed.   Use clean, old white pillow cases and sheets to avoid discoloring your bed linens.                                                                                                                                                                                                                                                                                   Please complete the checklist below, bring it with you to the hospital                               the day of surgery and give to the Pre-op Nurse     Preoperative Skin Prep Checklist        Patient Name Label             Enter dates and ?  boxes to indicate completed    Surgery Date: _______________ Regular Shower  Benzoyl Peroxide Wash   First Application:  2 days before_______________  (Stop shaving all body parts)           Morning or Evening                        Evening    Second Application:  1 day  before________________        Morning or Evening                          Evening   Third Application:  Day of Surgery______________                           Morning                   Morning

## 2024-08-08 ENCOUNTER — HOSPITAL ENCOUNTER (OUTPATIENT)
Facility: HOSPITAL | Age: 84
Setting detail: OBSERVATION
Discharge: HOME | End: 2024-08-08
Attending: ORTHOPAEDIC SURGERY | Admitting: ORTHOPAEDIC SURGERY
Payer: MEDICARE

## 2024-08-08 ENCOUNTER — ANESTHESIA EVENT (OUTPATIENT)
Dept: PERIOP | Facility: HOSPITAL | Age: 84
End: 2024-08-08
Payer: MEDICARE

## 2024-08-08 ENCOUNTER — ANESTHESIA (OUTPATIENT)
Dept: PERIOP | Facility: HOSPITAL | Age: 84
End: 2024-08-08
Payer: MEDICARE

## 2024-08-08 ENCOUNTER — APPOINTMENT (OUTPATIENT)
Dept: GENERAL RADIOLOGY | Facility: HOSPITAL | Age: 84
End: 2024-08-08
Payer: MEDICARE

## 2024-08-08 VITALS
HEART RATE: 73 BPM | DIASTOLIC BLOOD PRESSURE: 83 MMHG | OXYGEN SATURATION: 92 % | SYSTOLIC BLOOD PRESSURE: 138 MMHG | RESPIRATION RATE: 16 BRPM | TEMPERATURE: 97.5 F

## 2024-08-08 DIAGNOSIS — Z96.611 STATUS POST REVERSE ARTHROPLASTY OF SHOULDER, RIGHT: Primary | ICD-10-CM

## 2024-08-08 PROBLEM — Z96.612 STATUS POST REVERSE ARTHROPLASTY OF SHOULDER, LEFT: Status: ACTIVE | Noted: 2024-08-08

## 2024-08-08 PROCEDURE — 25010000002 PHENYLEPHRINE 10 MG/ML SOLUTION 5 ML VIAL: Performed by: NURSE ANESTHETIST, CERTIFIED REGISTERED

## 2024-08-08 PROCEDURE — 25010000002 BUPIVACAINE (PF) 0.5 % SOLUTION: Performed by: STUDENT IN AN ORGANIZED HEALTH CARE EDUCATION/TRAINING PROGRAM

## 2024-08-08 PROCEDURE — 25010000002 GLYCOPYRROLATE 1 MG/5ML SOLUTION: Performed by: NURSE ANESTHETIST, CERTIFIED REGISTERED

## 2024-08-08 PROCEDURE — C1776 JOINT DEVICE (IMPLANTABLE): HCPCS | Performed by: ORTHOPAEDIC SURGERY

## 2024-08-08 PROCEDURE — 25010000002 DEXAMETHASONE SODIUM PHOSPHATE 20 MG/5ML SOLUTION: Performed by: NURSE ANESTHETIST, CERTIFIED REGISTERED

## 2024-08-08 PROCEDURE — C9290 INJ, BUPIVACAINE LIPOSOME: HCPCS | Performed by: STUDENT IN AN ORGANIZED HEALTH CARE EDUCATION/TRAINING PROGRAM

## 2024-08-08 PROCEDURE — 25810000003 LACTATED RINGERS PER 1000 ML: Performed by: STUDENT IN AN ORGANIZED HEALTH CARE EDUCATION/TRAINING PROGRAM

## 2024-08-08 PROCEDURE — 25810000003 SODIUM CHLORIDE 0.9 % SOLUTION 250 ML FLEX CONT: Performed by: NURSE ANESTHETIST, CERTIFIED REGISTERED

## 2024-08-08 PROCEDURE — C1713 ANCHOR/SCREW BN/BN,TIS/BN: HCPCS | Performed by: ORTHOPAEDIC SURGERY

## 2024-08-08 PROCEDURE — 25010000002 SUGAMMADEX 200 MG/2ML SOLUTION: Performed by: NURSE ANESTHETIST, CERTIFIED REGISTERED

## 2024-08-08 PROCEDURE — 97535 SELF CARE MNGMENT TRAINING: CPT

## 2024-08-08 PROCEDURE — 97530 THERAPEUTIC ACTIVITIES: CPT

## 2024-08-08 PROCEDURE — 73020 X-RAY EXAM OF SHOULDER: CPT

## 2024-08-08 PROCEDURE — 0 BUPIVACAINE LIPOSOME 1.3 % SUSPENSION: Performed by: STUDENT IN AN ORGANIZED HEALTH CARE EDUCATION/TRAINING PROGRAM

## 2024-08-08 PROCEDURE — 25010000002 ONDANSETRON PER 1 MG: Performed by: NURSE ANESTHETIST, CERTIFIED REGISTERED

## 2024-08-08 PROCEDURE — 25010000002 FENTANYL CITRATE (PF) 50 MCG/ML SOLUTION: Performed by: STUDENT IN AN ORGANIZED HEALTH CARE EDUCATION/TRAINING PROGRAM

## 2024-08-08 PROCEDURE — 25010000002 CEFAZOLIN PER 500 MG: Performed by: ORTHOPAEDIC SURGERY

## 2024-08-08 PROCEDURE — A9270 NON-COVERED ITEM OR SERVICE: HCPCS | Performed by: ORTHOPAEDIC SURGERY

## 2024-08-08 PROCEDURE — 63710000001 POVIDONE-IODINE 10 % SOLUTION 14.8 ML BOTTLE: Performed by: ORTHOPAEDIC SURGERY

## 2024-08-08 PROCEDURE — 25010000002 FENTANYL CITRATE (PF) 50 MCG/ML SOLUTION: Performed by: NURSE ANESTHETIST, CERTIFIED REGISTERED

## 2024-08-08 PROCEDURE — 97165 OT EVAL LOW COMPLEX 30 MIN: CPT

## 2024-08-08 PROCEDURE — 25010000002 PROPOFOL 200 MG/20ML EMULSION: Performed by: NURSE ANESTHETIST, CERTIFIED REGISTERED

## 2024-08-08 PROCEDURE — G0378 HOSPITAL OBSERVATION PER HR: HCPCS

## 2024-08-08 DEVICE — IMPLANTABLE DEVICE
Type: IMPLANTABLE DEVICE | Site: SHOULDER | Status: FUNCTIONAL
Brand: EQUINOXE

## 2024-08-08 DEVICE — IMPLANTABLE DEVICE: Type: IMPLANTABLE DEVICE | Status: FUNCTIONAL

## 2024-08-08 DEVICE — HUMERAL LINER, CONSTRAINED
Type: IMPLANTABLE DEVICE | Site: SHOULDER | Status: FUNCTIONAL
Brand: EQUINOXE®

## 2024-08-08 DEVICE — HUMERAL LINER
Type: IMPLANTABLE DEVICE | Site: SHOULDER | Status: FUNCTIONAL
Brand: EQUINOXE®

## 2024-08-08 RX ORDER — DROPERIDOL 2.5 MG/ML
0.62 INJECTION, SOLUTION INTRAMUSCULAR; INTRAVENOUS
Status: DISCONTINUED | OUTPATIENT
Start: 2024-08-08 | End: 2024-08-08 | Stop reason: HOSPADM

## 2024-08-08 RX ORDER — ONDANSETRON 2 MG/ML
INJECTION INTRAMUSCULAR; INTRAVENOUS AS NEEDED
Status: DISCONTINUED | OUTPATIENT
Start: 2024-08-08 | End: 2024-08-08 | Stop reason: SURG

## 2024-08-08 RX ORDER — FENTANYL CITRATE 50 UG/ML
INJECTION, SOLUTION INTRAMUSCULAR; INTRAVENOUS AS NEEDED
Status: DISCONTINUED | OUTPATIENT
Start: 2024-08-08 | End: 2024-08-08 | Stop reason: SURG

## 2024-08-08 RX ORDER — SODIUM CHLORIDE 0.9 % (FLUSH) 0.9 %
3 SYRINGE (ML) INJECTION EVERY 12 HOURS SCHEDULED
Status: DISCONTINUED | OUTPATIENT
Start: 2024-08-08 | End: 2024-08-08 | Stop reason: HOSPADM

## 2024-08-08 RX ORDER — OXYCODONE HYDROCHLORIDE AND ACETAMINOPHEN 5; 325 MG/1; MG/1
1 TABLET ORAL EVERY 4 HOURS PRN
Status: CANCELLED | OUTPATIENT
Start: 2024-08-08 | End: 2024-08-13

## 2024-08-08 RX ORDER — HYDROCODONE BITARTRATE AND ACETAMINOPHEN 7.5; 325 MG/1; MG/1
1 TABLET ORAL EVERY 4 HOURS PRN
Status: DISCONTINUED | OUTPATIENT
Start: 2024-08-08 | End: 2024-08-08 | Stop reason: HOSPADM

## 2024-08-08 RX ORDER — ASPIRIN 81 MG/1
81 TABLET ORAL DAILY
Status: CANCELLED | OUTPATIENT
Start: 2024-08-09

## 2024-08-08 RX ORDER — LIDOCAINE HYDROCHLORIDE 20 MG/ML
INJECTION, SOLUTION EPIDURAL; INFILTRATION; INTRACAUDAL; PERINEURAL AS NEEDED
Status: DISCONTINUED | OUTPATIENT
Start: 2024-08-08 | End: 2024-08-08 | Stop reason: SURG

## 2024-08-08 RX ORDER — DIPHENHYDRAMINE HYDROCHLORIDE 50 MG/ML
12.5 INJECTION INTRAMUSCULAR; INTRAVENOUS
Status: DISCONTINUED | OUTPATIENT
Start: 2024-08-08 | End: 2024-08-08 | Stop reason: HOSPADM

## 2024-08-08 RX ORDER — FLUMAZENIL 0.1 MG/ML
0.2 INJECTION INTRAVENOUS AS NEEDED
Status: DISCONTINUED | OUTPATIENT
Start: 2024-08-08 | End: 2024-08-08 | Stop reason: HOSPADM

## 2024-08-08 RX ORDER — FENTANYL CITRATE 50 UG/ML
25 INJECTION, SOLUTION INTRAMUSCULAR; INTRAVENOUS
Status: DISCONTINUED | OUTPATIENT
Start: 2024-08-08 | End: 2024-08-08 | Stop reason: HOSPADM

## 2024-08-08 RX ORDER — SODIUM CHLORIDE, SODIUM LACTATE, POTASSIUM CHLORIDE, CALCIUM CHLORIDE 600; 310; 30; 20 MG/100ML; MG/100ML; MG/100ML; MG/100ML
9 INJECTION, SOLUTION INTRAVENOUS CONTINUOUS
Status: DISCONTINUED | OUTPATIENT
Start: 2024-08-08 | End: 2024-08-08 | Stop reason: HOSPADM

## 2024-08-08 RX ORDER — PROPOFOL 10 MG/ML
INJECTION, EMULSION INTRAVENOUS AS NEEDED
Status: DISCONTINUED | OUTPATIENT
Start: 2024-08-08 | End: 2024-08-08 | Stop reason: SURG

## 2024-08-08 RX ORDER — HYDRALAZINE HYDROCHLORIDE 20 MG/ML
5 INJECTION INTRAMUSCULAR; INTRAVENOUS
Status: DISCONTINUED | OUTPATIENT
Start: 2024-08-08 | End: 2024-08-08 | Stop reason: HOSPADM

## 2024-08-08 RX ORDER — EPHEDRINE SULFATE 50 MG/ML
INJECTION INTRAVENOUS AS NEEDED
Status: DISCONTINUED | OUTPATIENT
Start: 2024-08-08 | End: 2024-08-08 | Stop reason: SURG

## 2024-08-08 RX ORDER — ROCURONIUM BROMIDE 10 MG/ML
INJECTION, SOLUTION INTRAVENOUS AS NEEDED
Status: DISCONTINUED | OUTPATIENT
Start: 2024-08-08 | End: 2024-08-08 | Stop reason: SURG

## 2024-08-08 RX ORDER — ONDANSETRON 2 MG/ML
4 INJECTION INTRAMUSCULAR; INTRAVENOUS ONCE AS NEEDED
Status: DISCONTINUED | OUTPATIENT
Start: 2024-08-08 | End: 2024-08-08 | Stop reason: HOSPADM

## 2024-08-08 RX ORDER — PHENYLEPHRINE HCL IN 0.9% NACL 1 MG/10 ML
SYRINGE (ML) INTRAVENOUS AS NEEDED
Status: DISCONTINUED | OUTPATIENT
Start: 2024-08-08 | End: 2024-08-08 | Stop reason: SURG

## 2024-08-08 RX ORDER — GLYCOPYRROLATE 0.2 MG/ML
INJECTION INTRAMUSCULAR; INTRAVENOUS AS NEEDED
Status: DISCONTINUED | OUTPATIENT
Start: 2024-08-08 | End: 2024-08-08 | Stop reason: SURG

## 2024-08-08 RX ORDER — DEXAMETHASONE SODIUM PHOSPHATE 4 MG/ML
INJECTION, SOLUTION INTRA-ARTICULAR; INTRALESIONAL; INTRAMUSCULAR; INTRAVENOUS; SOFT TISSUE AS NEEDED
Status: DISCONTINUED | OUTPATIENT
Start: 2024-08-08 | End: 2024-08-08 | Stop reason: SURG

## 2024-08-08 RX ORDER — EPHEDRINE SULFATE 50 MG/ML
5 INJECTION, SOLUTION INTRAVENOUS ONCE AS NEEDED
Status: DISCONTINUED | OUTPATIENT
Start: 2024-08-08 | End: 2024-08-08 | Stop reason: HOSPADM

## 2024-08-08 RX ORDER — IPRATROPIUM BROMIDE AND ALBUTEROL SULFATE 2.5; .5 MG/3ML; MG/3ML
3 SOLUTION RESPIRATORY (INHALATION) ONCE AS NEEDED
Status: DISCONTINUED | OUTPATIENT
Start: 2024-08-08 | End: 2024-08-08 | Stop reason: HOSPADM

## 2024-08-08 RX ORDER — BUPIVACAINE HYDROCHLORIDE 5 MG/ML
INJECTION, SOLUTION EPIDURAL; INTRACAUDAL
Status: COMPLETED | OUTPATIENT
Start: 2024-08-08 | End: 2024-08-08

## 2024-08-08 RX ORDER — SODIUM CHLORIDE 0.9 % (FLUSH) 0.9 %
3-10 SYRINGE (ML) INJECTION AS NEEDED
Status: DISCONTINUED | OUTPATIENT
Start: 2024-08-08 | End: 2024-08-08 | Stop reason: HOSPADM

## 2024-08-08 RX ORDER — OXYCODONE HYDROCHLORIDE AND ACETAMINOPHEN 5; 325 MG/1; MG/1
1 TABLET ORAL EVERY 4 HOURS PRN
Qty: 30 TABLET | Refills: 0 | Status: SHIPPED | OUTPATIENT
Start: 2024-08-08

## 2024-08-08 RX ORDER — LIDOCAINE HYDROCHLORIDE 10 MG/ML
0.5 INJECTION, SOLUTION INFILTRATION; PERINEURAL ONCE AS NEEDED
Status: DISCONTINUED | OUTPATIENT
Start: 2024-08-08 | End: 2024-08-08 | Stop reason: HOSPADM

## 2024-08-08 RX ORDER — NALOXONE HCL 0.4 MG/ML
0.2 VIAL (ML) INJECTION AS NEEDED
Status: DISCONTINUED | OUTPATIENT
Start: 2024-08-08 | End: 2024-08-08 | Stop reason: HOSPADM

## 2024-08-08 RX ORDER — FENTANYL CITRATE 50 UG/ML
25 INJECTION, SOLUTION INTRAMUSCULAR; INTRAVENOUS ONCE AS NEEDED
Status: COMPLETED | OUTPATIENT
Start: 2024-08-08 | End: 2024-08-08

## 2024-08-08 RX ORDER — TRANEXAMIC ACID 100 MG/ML
INJECTION, SOLUTION INTRAVENOUS AS NEEDED
Status: DISCONTINUED | OUTPATIENT
Start: 2024-08-08 | End: 2024-08-08 | Stop reason: SURG

## 2024-08-08 RX ORDER — PROMETHAZINE HYDROCHLORIDE 25 MG/1
25 SUPPOSITORY RECTAL ONCE AS NEEDED
Status: DISCONTINUED | OUTPATIENT
Start: 2024-08-08 | End: 2024-08-08 | Stop reason: HOSPADM

## 2024-08-08 RX ORDER — HYDROCODONE BITARTRATE AND ACETAMINOPHEN 5; 325 MG/1; MG/1
1 TABLET ORAL ONCE AS NEEDED
Status: DISCONTINUED | OUTPATIENT
Start: 2024-08-08 | End: 2024-08-08 | Stop reason: HOSPADM

## 2024-08-08 RX ORDER — LABETALOL HYDROCHLORIDE 5 MG/ML
5 INJECTION, SOLUTION INTRAVENOUS
Status: DISCONTINUED | OUTPATIENT
Start: 2024-08-08 | End: 2024-08-08 | Stop reason: HOSPADM

## 2024-08-08 RX ORDER — PROMETHAZINE HYDROCHLORIDE 25 MG/1
25 TABLET ORAL ONCE AS NEEDED
Status: DISCONTINUED | OUTPATIENT
Start: 2024-08-08 | End: 2024-08-08 | Stop reason: HOSPADM

## 2024-08-08 RX ORDER — HYDROMORPHONE HYDROCHLORIDE 1 MG/ML
0.25 INJECTION, SOLUTION INTRAMUSCULAR; INTRAVENOUS; SUBCUTANEOUS
Status: DISCONTINUED | OUTPATIENT
Start: 2024-08-08 | End: 2024-08-08 | Stop reason: HOSPADM

## 2024-08-08 RX ADMIN — SUGAMMADEX 200 MG: 100 INJECTION, SOLUTION INTRAVENOUS at 09:50

## 2024-08-08 RX ADMIN — EPHEDRINE SULFATE 20 MG: 50 INJECTION INTRAVENOUS at 09:14

## 2024-08-08 RX ADMIN — BUPIVACAINE HYDROCHLORIDE 10 ML: 5 INJECTION, SOLUTION EPIDURAL; INTRACAUDAL; PERINEURAL at 07:42

## 2024-08-08 RX ADMIN — ONDANSETRON 4 MG: 2 INJECTION INTRAMUSCULAR; INTRAVENOUS at 09:39

## 2024-08-08 RX ADMIN — ROCURONIUM BROMIDE 10 MG: 10 INJECTION, SOLUTION INTRAVENOUS at 09:09

## 2024-08-08 RX ADMIN — BUPIVACAINE 10 ML: 13.3 INJECTION, SUSPENSION, LIPOSOMAL INFILTRATION at 07:42

## 2024-08-08 RX ADMIN — Medication 150 MCG: at 08:36

## 2024-08-08 RX ADMIN — FENTANYL CITRATE 50 MCG: 50 INJECTION, SOLUTION INTRAMUSCULAR; INTRAVENOUS at 08:23

## 2024-08-08 RX ADMIN — PROPOFOL 150 MG: 10 INJECTION, EMULSION INTRAVENOUS at 08:23

## 2024-08-08 RX ADMIN — ROCURONIUM BROMIDE 50 MG: 10 INJECTION, SOLUTION INTRAVENOUS at 08:23

## 2024-08-08 RX ADMIN — FENTANYL CITRATE 50 MCG: 50 INJECTION, SOLUTION INTRAMUSCULAR; INTRAVENOUS at 07:34

## 2024-08-08 RX ADMIN — Medication 150 MCG: at 08:29

## 2024-08-08 RX ADMIN — LIDOCAINE HYDROCHLORIDE 80 MG: 20 INJECTION, SOLUTION EPIDURAL; INFILTRATION; INTRACAUDAL; PERINEURAL at 08:23

## 2024-08-08 RX ADMIN — GLYCOPYRROLATE 0.2 MG: 0.2 INJECTION, SOLUTION INTRAMUSCULAR; INTRAVENOUS at 08:40

## 2024-08-08 RX ADMIN — TRANEXAMIC ACID 1000 MG: 100 INJECTION, SOLUTION INTRAVENOUS at 08:35

## 2024-08-08 RX ADMIN — SODIUM CHLORIDE, POTASSIUM CHLORIDE, SODIUM LACTATE AND CALCIUM CHLORIDE: 600; 310; 30; 20 INJECTION, SOLUTION INTRAVENOUS at 08:12

## 2024-08-08 RX ADMIN — Medication 100 MCG: at 08:48

## 2024-08-08 RX ADMIN — Medication 100 MCG: at 08:59

## 2024-08-08 RX ADMIN — SODIUM CHLORIDE 2000 MG: 900 INJECTION INTRAVENOUS at 08:12

## 2024-08-08 RX ADMIN — DEXAMETHASONE SODIUM PHOSPHATE 6 MG: 4 INJECTION, SOLUTION INTRAMUSCULAR; INTRAVENOUS at 08:49

## 2024-08-08 RX ADMIN — PHENYLEPHRINE HYDROCHLORIDE 0.5 MCG/KG/MIN: 10 INJECTION, SOLUTION INTRAVENOUS at 09:18

## 2024-08-08 RX ADMIN — EPHEDRINE SULFATE 20 MG: 50 INJECTION INTRAVENOUS at 08:49

## 2024-08-08 RX ADMIN — Medication 150 MCG: at 09:07

## 2024-08-08 NOTE — PLAN OF CARE
The pt is POD 0 from a L reverse total shoulder arthroplasty. The pt participated in total body dressing, mobility, sling management and was able to verbalize understanding of her precautions/HEP. OP PT planned at d/c.

## 2024-08-08 NOTE — ANESTHESIA POSTPROCEDURE EVALUATION
Patient: Chula Adkins    Procedure Summary       Date: 08/08/24 Room / Location:  [facility] [address] [place] [address] [facility]    Anesthesia Start: 0811 Anesthesia Stop: 1005    Procedure: LEFT TOTAL SHOULDER REVERSE ARTHROPLASTY (Left: Shoulder) Diagnosis:     Surgeons: Sukhwinder Hightower MD Provider: Bahman Vanegas MD    Anesthesia Type: general with block ASA Status: 3            Anesthesia Type: general with block    Vitals  Vitals Value Taken Time   /83 08/08/24 1100   Temp 36.4 °C (97.5 °F) 08/08/24 1100   Pulse 69 08/08/24 1100   Resp 17 08/08/24 1045   SpO2 92 % 08/08/24 1100           Post Anesthesia Care and Evaluation    Pain management: adequate    Airway patency: patent  Anesthetic complications: No anesthetic complications    Cardiovascular status: acceptable  Respiratory status: acceptable  Hydration status: acceptable    Comments: /83 (BP Location: Right arm, Patient Position: Lying)   Pulse 69   Temp 36.4 °C (97.5 °F)   Resp 17   SpO2 92%

## 2024-08-08 NOTE — OP NOTE
Date: 8/8/2024  Time: 14:24 EDT TOTAL SHOULDER REVERSE ARTHROPLASTY  Procedure Note    Chula Adkins  8/8/2024    Pre-op Diagnosis:   Primary glenohumeral osteoarthritis left shoulder    Post-op Diagnosis  Primary glenohumeral osteoarthritis left shoulder    Procedure:  Left reverse total shoulder arthroplasty with intraoperative computer navigation    Surgeon: Sukhwinder Hightower M.D.    Surgical assistant: KATHY Coleman      Anesthesia: General with Block  Anesthesiologist: Bahman Vanegas MD  CRNA: Raya Paris CRNA    Staff:   Circulator: Shakila Manzo RN  Scrub Person: Tyler Edmonds  Vendor Representative: Floyd Feldman ([employer])  Assistant: Venessa Isabel APRN    Indication for Asst.  A surgical assistant, KATHY Coleman, was utilized as a medical necessity and was present through the entire procedure allowing safe completion of the procedure by helping with exposure and placement of implants as well as reducing overall operative time and morbidity for the patient.    IV antibiotic: Cefazolin    Estimated Blood Loss: 200 ml    Specimens: * No orders in the log *    Complications: None    Implants: [employer] reverse total shoulder system     Implant specifics: 10 mm Preserve press-fit stem, +0 humeral adapter tray with torque limiting screw, +0 humeral liner 38 mm, standard baseplate with 8 degree posterior augment and 4 compression screws and locking caps, 38 mm glenosphere with central screw.    SURGICAL APPROACH: Deltopectoral      SURGICAL TECHNIQUE: Peel off         Procedure: The patient was taken to the operative suite.  After adequate anesthesia was established the upper extremity was prepped and draped in the usual fashion.  The head was placed in a neutral position and bony prominences were padded.  Ioban was utilized covering the skin over the shoulder and axilla.  Preoperative antibiotics and transexamic acid were confirmed.  An anterior incision  was made starting lateral to the coracoid towards the axillary crease through skin and subcutaneous tissues.  The deltopectoral interval was entered.  The cephalic vein was identified, preserved, and retracted laterally.  The conjoined tendon was reflected medially.  The biceps tendon was identified near the pectoralis insertion site.  A small portion of the pectoralis tendon was released and then the biceps tendon was tenodesed to the pectoralis tendon insertion on the humeral.  The biceps tendon was then tenotomized more proximally.  The subscapularis was peeled off the lesser tuberosity and tagged.  Arthropathic changes were noted in the glenohumeral joint.  The rotator cuff was in adequate condition.  The capsule was released off the humeral neck and the posterior soft tissue attachments were protected. An external cutting guide was utilized and the head was cut at a 20° retroverted angle.  Excess osteophytes were excised with a rongeur.  Sequential impaction broaching was carried out using the Preserve system broaches.  The final broach was left in place for later trialing.   I then visualized the glenoid and retractors were placed starting posteriorly and superiorly.  The capsule was reflected off the deep surface of the subscapularis.  An anterior retractor was placed.  The labrum was then circumferentially excised off of the glenoid.  The biceps stump was released and removed as well.  A careful capsular release off the inferior glenoid was performed protecting the axillary nerve and vasculature.  This exposed the scapular pillar.    This allowed for visualization of the glenoid.  The coracoid was skeletonized with the Bovie exposing the anterior portion and the neck of the coracoid.  A tracker was fixed to the coracoid with 2 small bicortical screws with firm fixation.  Acquisitions were then obtained from the bony surface of the coracoid and glenoid to transfer data to the computer to allow for navigation.   Once this was complete I was able to use computer guidance for placement of the glenoid.  The preoperative plan demonstrated retroversion of the glenoid that corrected best with an 8 degree posterior augment to minimize bone reaming.  Using navigation a central drill point was drilled.  Sequential reaming was carried out at the prescribed angle and to the prescribed depth based on the preoperative plan using intraoperative computer guidance.    A central hole was then drilled for the cage using computer navigation.  This assured that we were within the vault of the glenoid and preserved as much bone as possible during reaming.    Autogenous bone graft was harvested from the humeral head and packed into the cage of the baseplate.  The baseplate was then compressed onto the glenoid.  Rotation was checked with computer navigation.  Computer navigation was then used to drill holes for the compression screws.  These were then placed to the appropriate depth and locking caps were placed over the screw heads.  Good baseplate placement and stable fixation were noted.  An appropriate size glenosphere based on the preoperative plan and intraoperative visualization of the anatomy was chosen and fixed to the baseplate with a central compression screw.  I then removed the tracker from the coracoid by removing both bicortical screws.  I then trialed the humeral tray and polyethylene.  I was looking for smooth nonimpinged range of motion.  I also wanted to assure good stability by placing traction on the arm and also checking for lateral stability.  Once satisfied with range of motion and stability I removed the humeral components.  I pulse lavaged the proximal humerus and press-fit the appropriate size stem with good purchase and fixation.  Next I re-trialed the humeral tray and humeral liner.  When I was satisfied with range of motion and stability I removed the trial liner and tray.  The appropriate humeral tray was then  placed and held with a torque limiting screw.  The chosen polyethylene was then inserted and compressed into place and the shoulder was reduced.  Subdeltoid scar tissue was excised.  Good range of motion was noted.  Good stability was noted.  Vigorous irrigation and suctioning was performed.  The subscapularis was evaluated for repair.  The deltopectoral interval was closed with 0 Vicryl.  The subcutaneous tissues were closed with 2-0 Vicryl.  The skin was closed with a zip line device.  The patient had a sterile compression dressing applied and was awoken and taken to the postanesthetic recovery unit in good condition.    Sukhwinder Hightower MD     Date: 8/8/2024  Time: 14:24 EDT

## 2024-08-08 NOTE — DISCHARGE SUMMARY
Orthopedic Discharge Summary      Patient: Chula Adkins      YOB: 1940    Medical Record Number: [id number]    Attending Physician: No att. providers found  Consulting Physician(s):   Date of Admission: 8/8/2024  6:06 AM  Date of Discharge: 8/8/2024      Status post reverse arthroplasty of shoulder, left    [unfilled]    Allergies: No Known Allergies    Current Medications:     Discharge Medications        Changes to Medications        Instructions Start Date   oxyCODONE-acetaminophen  MG per tablet  Commonly known as: PERCOCET  What changed: Another medication with the same name was added. Make sure you understand how and when to take each.   0.5-1 tablets, Oral, Every 4 Hours PRN      oxyCODONE-acetaminophen 5-325 MG per tablet  Commonly known as: PERCOCET  What changed: You were already taking a medication with the same name, and this prescription was added. Make sure you understand how and when to take each.   1 tablet, Oral, Every 4 Hours PRN             Continue These Medications        Instructions Start Date   albuterol sulfate  (90 Base) MCG/ACT inhaler  Commonly known as: PROVENTIL HFA;VENTOLIN HFA;PROAIR HFA   2 puffs, Inhalation, Every 6 Hours PRN      apixaban 5 MG tablet tablet  Commonly known as: ELIQUIS   5 mg, Oral, 2 Times Daily, INSTRUCTED PT TO FOLLOW MD INSTRUCTIONS REGARDING HOLDING FOR SURGERY      APPLE CIDER VINEGAR PO   1 tablet, Oral, Daily      bumetanide 1 MG tablet  Commonly known as: BUMEX   2 mg, Oral, Daily PRN      buPROPion  MG 24 hr tablet  Commonly known as: WELLBUTRIN XL   300 mg, Oral, Every Morning      carvedilol 3.125 MG tablet  Commonly known as: COREG   3.125 mg, Oral, 2 Times Daily With Meals      fish oil 1000 MG capsule capsule   1,000 mg, Oral, Daily With Breakfast, HOLD FOR SURGERY      levothyroxine 25 MCG tablet  Commonly known as: SYNTHROID, LEVOTHROID   25 mcg, Oral, Every Early Morning      Magnesium 500 MG capsule   1  capsule, Oral, Daily      multivitamin with minerals tablet tablet   1 tablet, Oral, Daily      multivitamin with minerals tablet tablet   1 tablet, Oral, Daily      polyethylene glycol 17 g packet  Commonly known as: MIRALAX   17 g, Oral, Daily      rOPINIRole 3 MG tablet  Commonly known as: REQUIP   3-6 mg, Oral, Nightly      simvastatin 40 MG tablet  Commonly known as: ZOCOR   40 mg, Oral, Nightly      TURMERIC PO   1 capsule, Oral, Daily               Past Medical History:   Diagnosis Date    Anxiety     Arthritis     CHF (congestive heart failure)     NO RECENT ISSUES    Chronic pain     NECK AND LOW BACK    Constipation     Disease of thyroid gland     Fibromyalgia     Hemorrhoids     History of acute respiratory failure     10/2023    History of pulmonary embolus (PE) 11/2023    ON ELIQUIS    History of sepsis     10/2023    Hyperlipidemia     Hypertension     Lactic acidosis     Pain in back     SPONDYLOSIS, LUMBAR, PAIN [facility] CL[facility]IC    Restless leg syndrome     Shoulder pain, bilateral      Past Surgical History:   Procedure Laterality Date    APPENDECTOMY      BLADDER REPAIR      CATARACT EXTRACTION EXTRACAPSULAR W/ INTRAOCULAR LENS IMPLANTATION Bilateral     COLON SURGERY      REPAIR FROM HOLE IN COLON    COLONOSCOPY      ELBOW PROCEDURE Left     MSSA  WOUND, COMPLETLY RESOLVED    EXCISION LESION Right     MAXILARY GLAND X2    FOOT SURGERY Bilateral     TARSAL TUNNEL SURGERY    GALLBLADDER SURGERY      HYSTERECTOMY      RECTAL PROLAPSE REPAIR      2019 IN [place]    RECTOPEXY N/A 10/14/2022    Procedure: ROBOT ASSISTED  RECTOPEXY;  Surgeon: Navjot Diaz MD;  Location: [facility] [place];  Service: Robotics - [facility];  Laterality: N/A;    SHOULDER SURGERY Right     RCR X2    TOTAL SHOULDER ARTHROPLASTY W/ DISTAL CLAVICLE EXCISION Right 03/07/2024    Procedure: TOTAL SHOULDER REVERSE ARTHROPLASTY;  Surgeon: Sukhwinder Hightower MD;  Location: [facility] OR [facility] [place];  Service: Orthopedics;  Laterality: Right;      Social History     Occupational History    Not on file   Tobacco Use    Smoking status: Never     Passive exposure: Past    Smokeless tobacco: Never   Vaping Use    Vaping status: Never Used   Substance and Sexual Activity    Alcohol use: Never    Drug use: Never    Sexual activity: Not Currently      Social History     Social History Narrative    ** Merged History Encounter **          Family History   Problem Relation Age of Onset    Stroke Sister     Hypertension Neg Hx     Diabetes Neg Hx     Cancer Neg Hx     Malig Hyperthermia Neg Hx        Physical Exam: 83 y.o. female  General Appearance:    Alert, cooperative, in no acute distress                      Vitals:    08/08/24 1030 08/08/24 1045 08/08/24 1100 08/08/24 1106   BP: (!) 134/109 129/75 136/83 138/83   BP Location: Right arm Right arm Right arm Right arm   Patient Position: Lying Lying Lying Lying   Pulse: 74 78 69 73   Resp: 22 17  16   Temp:   97.5 °F (36.4 °C)    TempSrc:       SpO2: 94% 92% 92% 92%                                                                                Hospital Course:  83 y.o. female had left reverse total shoulder arthroplasty as a outpatient procedure.   When she was stabilized and doing well and postop recovery she was discharged home.  Appropriate education re: incision care, activity levels, medications, and follow up visits was completed and all questions were answered. The patient is now deemed stable for discharge to Home.    Discharge and Follow up Instructions: Follow up in 10-14 days.    Date: [unfilled]  Sukhwinder Hightower MD

## 2024-08-08 NOTE — DISCHARGE INSTRUCTIONS
Dr. Sukhwinder Hightower  [address] [address]  [phone]    Outpatient REVERSE TOTAL SHOULDER REPLACEMENT  HOSPITAL DISCHARGE INSTRUCTIONS     GENERAL INFORMATION: With improved surgical techniques for total shoulder and reverse total shoulder replacement and the Baptism of ultrasound guided long acting nerve blocks, the hospital stay for patients has decreased significantly.  Many people can go home right after surgery as an outpatient.    SPECIFIC POST-OP INSTRUCTIONS:  * FOLLOW UP APPOINTMENT: You should have been given an appointment to follow up 10-14 days after your date of surgery. We can see you back sooner if there are any problems or concerns.   * BLOOD THINNERS: All patients will be on some type of blood thinner post-op to prevent blood clot. Most patients who are not already on a blood thinner are discharged on over-the-counter aspirin 81 mg or 325mg daily which you will take for three weeks. If you were taking a blood thinner prior to surgery, we will have you resume this on the first day post-op.   * ICE: Ice helps to decrease both pain and swelling. Ice should be applied to the shoulder 20-25 minutes each hour while awake.    DRESSING CHANGES: We ask that you keep the incision clean and dry.  Your surgical dressing can be removed 48 hours after surgery.  There will be a yellow strip of gauze and a Zipline device that will be underneath the dressing.  The yellow gauze can be removed but leave the Zipline alone.  You can then apply a watertight dressing over the Zipline to protect it.  You can get this type of dressing from the hospital before you leave or at your local pharmacy. SHOWERING: The wound edges typically come together and seal by 3-5 days post-op if there is no drainage. Again, please keep the same waterproof dressing on. DO NOT SUBMERSE SHOULDER IN POOL,HOT TUB OR BATH UNTIL AT LEAST 3-4 WEEKS POST-OP (EVEN WITH WATERPROOF BANDAIDS).  * PAIN  MEDICINE: You will be given a prescription  for oral pain medication prior to discharge from the hospital. Please let us know if you have a sensitivity to certain pain medications prior to discharge. Additional pain medication prescriptions can be called into your pharmacy during normal business hours. NO medications can be called in over the weekends or after business hours.   * ORAL ANTI-INFLAMMATORIES:   You will be asked to discontinue ALL oral anti-inflammatories prior to surgery. You can resume these the first day post-op.These can be combined with the oral pain medications safely. DO NOT TAKE ADDITIONAL TYLENOL WITH THE NARCOTIC PAIN MEDICATION (it already has Tylenol in it). If you were not taking an anti-inflammatory pre-op, you can start one on the first day post-op. It will help decrease pain and swelling. Typical medications and dosages are as follows:   Advil/Motrin/Ibuprofen 200mg, 4 pills every 8 hours   Aleve/Naproxen Sodium 220mg, 2 pills every 12 hours  * SLING: We recommend you wear the sling at all times, other than when showering or doing physical therapy exercises.    * WEIGHT BEARING: We ask that you be non-weight bearing on the operative shoulder. Do not use the operative arm to lift/push/pull greater than 5lbs.   * PHYSICAL THERAPY: Before you leave the hospital staff will teach you some very gentle range of motion exercises to do at home for the first 10-14 days. After we see you in the office during your first post-op visit, we will give you a prescription to start formal physical therapy. This can be done downstairs at [facility] or at a location of your choice. Physical therapy is typically 2-3 times a week for 4-6 weeks, depending on the individual and their progress.   * DRIVING A CAR: Medically/legally we can not recommend you drive a car while in the sling or while on pain medication (roughly 10-14 days).   * RETURNING TO DAILY AND RECREATIONAL ACTIVITIES: For the most part patients can progress to daily activities as tolerated  "(keeping in mind the restrictions listed above). Initially, we do not want you to \"overdo\" it in an attempt to minimize post-op pain and swelling. Once the swelling is controlled, you can progress with activities as tolerated. Pain and swelling should be your guide to increasing your activity level.   * RETURN TO WORK: The return to work date depends on many factors and is very dependent on the individual. You would most likely be able to return to a sedentary job after your first post-op visit (10-14 days after surgery). A more physical job would obviously require a longer recovery time before return to work.          What to expect after a Nerve Block    Nerve blocks administered to block pain affect many types of nerves, including those nerves that control movement, pain, and normal sensation. Following a nerve block, you may notice some bruising at the site where the block was given. You may experience sensations such as: numbness of the affected area or limb, tingling, heaviness (that is the limb feels heavy to you), weakness or inability to move the affected arm or leg, or a feeling as if your arm or leg has “fallen asleep.”     A nerve block can last from 2 to 36 hours depending on the medications used.  Usually the weakness wears off first followed by the tingling and heaviness. As the block wears off, you may begin to notice pain; however, this sequence of events may occur in any order. Typically, you will be able to move your limb before you will feel it. Once a nerve block begins to wear off, the effects are usually completely gone within 60 minutes.  If you experience continued side effects that you believe are block related for longer than 48 hours, please call your healthcare provider. Please see block-specific instructions below.    Instructions for any block involving the shoulder or arm  If you have had any kind of shoulder/arm block, you will go home with your arm in a sling. Wear the sling until " the block has completely worn off. You may be required to wear it for a longer period of time per your surgeon’s recommendations.  If you have had a shoulder/arm block, it is a good idea to sleep on a recliner with pillows under your arm.    You may experience symptoms such as:  Shortness of breath  Hoarseness   Blurry vision  Unequal pupils  Drooping of your face on the same side as the block was performed    These are side effects associated with this kind of block and should go away within 12 hours.    Note: If you have severe or prolonged shortness of breath, please seek medical assistance as soon as possible.     Protection of a “blocked” arm or leg (limb)  After a nerve block, you cannot feel pain, pressure, or extremes of temperature in the affected limb. And because of this, your blocked limb is at more risk for injury. For example, it is possible to burn your limb on an extremely hot surface without feeling it.     When resting, it is important to reposition your limb periodically to avoid prolonged pressure on it. This may require the use of pillows and padding.    While sleeping, you should avoid rolling onto the affected limb or putting too much pressure on it.     If you have a cast or tight dressing, check the color of your fingers or toes of the affected limb. Call your surgeon if they look discolored (that is, dusky, dark colored).    Use caution in cold weather. Cover your limb appropriately to protect it from the cold.      Pain Management:    Your surgeon will give you a prescription for pain medication. Begin taking this before the nerve block wears off. Bear in mind that sometimes the block can wear off in the middle of the night.

## 2024-08-08 NOTE — THERAPY DISCHARGE NOTE
Acute Care - Occupational Therapy Discharge  [facility]    Patient Name: Chula Adkins  : 1940    MRN: [id number]                              Today's Date: 2024       Admit Date: 2024    Visit Dx:     ICD-10-CM ICD-9-CM   1. Status post reverse arthroplasty of shoulder, right  Z96.611 V43.61     Patient Active Problem List   Diagnosis    Acute respiratory failure with hypoxia    Chronic right shoulder pain    Rectal prolapse    Chronic maxillary sinusitis    S/P reverse total shoulder arthroplasty, right    Status post reverse arthroplasty of shoulder, right    Status post reverse arthroplasty of shoulder, left     Past Medical History:   Diagnosis Date    Anxiety     Arthritis     CHF (congestive heart failure)     NO RECENT ISSUES    Chronic pain     NECK AND LOW BACK    Constipation     Disease of thyroid gland     Fibromyalgia     Hemorrhoids     History of acute respiratory failure     10/2023    History of pulmonary embolus (PE) 2023    ON ELIQUIS    History of sepsis     10/2023    Hyperlipidemia     Hypertension     Lactic acidosis     Pain in back     SPONDYLOSIS, LUMBAR, PAIN MGMT CLINIC    Restless leg syndrome     Shoulder pain, bilateral      Past Surgical History:   Procedure Laterality Date    APPENDECTOMY      BLADDER REPAIR      CATARACT EXTRACTION EXTRACAPSULAR W/ INTRAOCULAR LENS IMPLANTATION Bilateral     COLON SURGERY      REPAIR FROM HOLE IN COLON    COLONOSCOPY      ELBOW PROCEDURE Left     MSSA  WOUND, COMPLETLY RESOLVED    EXCISION LESION Right     MAXILARY GLAND X2    FOOT SURGERY Bilateral     TARSAL TUNNEL SURGERY    GALLBLADDER SURGERY      HYSTERECTOMY      RECTAL PROLAPSE REPAIR      2019 IN [place]    RECTOPEXY N/A 10/14/2022    Procedure: ROBOT ASSISTED  RECTOPEXY;  Surgeon: Navjot Diaz MD;  [place] [facility] [place] [facility] [place]  Service: Robotics - DaVinci;  Laterality: N/A;    SHOULDER SURGERY Right     RCR X2    TOTAL SHOULDER ARTHROPLASTY W/ DISTAL  CLAVICLE EXCISION Right 03/07/2024    Procedure: TOTAL SHOULDER REVERSE ARTHROPLASTY;  Surgeon: Sukhwinder Hightower MD;  Location: [facility] OR [facility] [place];  Service: Orthopedics;  Laterality: Right;      General Information       Row Name 08/08/24 1307          OT Time and Intention    Document Type discharge evaluation/summary  -RB     Mode of Treatment individual therapy;occupational therapy  -RB       Row Name 08/08/24 1307          General Information    Patient Profile Reviewed yes  -RB     Prior Level of Function independent:;ADL's;transfer  -RB     Existing Precautions/Restrictions shoulder;non-weight bearing  -RB     Barriers to Rehab none identified  -RB       Row Name 08/08/24 1307          Living Environment    People in Home spouse  -RB       Row Name 08/08/24 1307          Cognition    Orientation Status (Cognition) oriented x 4  -RB       Row Name 08/08/24 1307          Safety Issues, Functional Mobility    Impairments Affecting Function (Mobility) range of motion (ROM);sensation/sensory awareness;strength  -RB               User Key  (r) = Recorded By, (t) = Taken By, (c) = Cosigned By      Initials Name Provider Type    RB Veronica Webb OT Occupational Therapist                   Mobility/ADL's       Row Name 08/08/24 1307          Bed Mobility    Bed Mobility supine-sit  -RB     Supine-Sit [place] (Bed Mobility) standby assist  -RB       Row Name 08/08/24 1307          Transfers    Transfers sit-stand transfer;stand-sit transfer;toilet transfer  -RB       Row Name 08/08/24 1307          Sit-Stand Transfer    Sit-Stand [place] (Transfers) contact guard  -RB       Row Name 08/08/24 1307          Stand-Sit Transfer    Stand-Sit [place] (Transfers) contact guard  -RB       Row Name 08/08/24 1307          Toilet Transfer    Type (Toilet Transfer) sit-stand;stand-sit  -RB     [place] Level (Toilet Transfer) contact guard  -RB       Row Name 08/08/24 1307          Functional Mobility     Functional Mobility- Ind. Level contact guard assist  -RB       Row Name 08/08/24 1307          Activities of Daily Living    BADL Assessment/Intervention upper body dressing;lower body dressing;toileting  -RB       Row Name 08/08/24 1307          Mobility    Extremity Weight-bearing Status left upper extremity  -RB     Left Upper Extremity (Weight-bearing Status) non weight-bearing (NWB)  -RB       Row Name 08/08/24 1307          Upper Body Dressing Assessment/Training    [place] Level (Upper Body Dressing) upper body dressing skills;minimum assist (75% patient effort)  -RB     Position (Upper Body Dressing) supported sitting  -RB       Row Name 08/08/24 1307          Lower Body Dressing Assessment/Training    [place] Level (Lower Body Dressing) lower body dressing skills;minimum assist (75% patient effort)  -RB     Position (Lower Body Dressing) supported sitting;supported standing  -RB       Row Name 08/08/24 1307          Toileting Assessment/Training    [place] Level (Toileting) toileting skills;minimum assist (75% patient effort);adjust/manage clothing  -RB     Position (Toileting) supported sitting;supported standing  -RB               User Key  (r) = Recorded By, (t) = Taken By, (c) = Cosigned By      Initials Name Provider Type    RB Veronica Webb OT Occupational Therapist                   Obj/Interventions       Row Name 08/08/24 1310          Sensory Assessment (Somatosensory)    Sensory Assessment nerve block intact  -RB       Row Name 08/08/24 1310          Vision Assessment/Intervention    Visual Impairment/Limitations WFL  -RB       Row Name 08/08/24 1310          Range of Motion Comprehensive    Comment, General Range of Motion Slight wiggling present in L hand. nerve block intact.  -RB       Row Name 08/08/24 1310          Strength Comprehensive (MMT)    Comment, General Manual Muscle Testing (MMT) Assessment Post op LUE weakness  -RB       Row Name 08/08/24 1310          Motor  Skills    Therapeutic Exercise --  The pt and her spouse voiced understanding of her precautions, HEP to begin POD 1, demo provided.  -RB       Row Name 08/08/24 1310          Balance    Comment, Balance CGA  -RB               User Key  (r) = Recorded By, (t) = Taken By, (c) = Cosigned By      Initials Name Provider Type    RB Veronica Webb OT Occupational Therapist                   Goals/Plan       Row Name 08/08/24 1311          Problem Specific Goal 1 (OT)    Problem Specific Goal 1 (OT) The pt and caregiver will verbalized understanding of the pt's precautions, HEP and sling mangement by d/c.  -RB     Time Frame (Problem Specific Goal 1, OT) short term goal (STG);2 weeks  -RB     Progress/Outcome (Problem Specific Goal 1, OT) new goal  -RB               User Key  (r) = Recorded By, (t) = Taken By, (c) = Cosigned By      Initials Name Provider Type    Veronica Hamilton OT Occupational Therapist                   Clinical Impression       Row Name 08/08/24 1311          Pain Assessment    Pretreatment Pain Rating 0/10 - no pain  -RB     Posttreatment Pain Rating 0/10 - no pain  -RB       Row Name 08/08/24 1311          Plan of Care Review    Plan of Care Reviewed With patient;spouse  -RB     Progress no change  -RB     Outcome Evaluation The pt is POD 0 from a L reverse total shoulder arthroplasty. The pt participated in total body dressing, mobility, sling management and was able to verbalize understanding of her precautions/HEP. OP PT planned at d/c.  -RB       Row Name 08/08/24 1311          Therapy Assessment/Plan (OT)    Criteria for Skilled Therapeutic Interventions Met (OT) no;other (see comments)  D/C  -RB       Row Name 08/08/24 1311          Therapy Plan Review/Discharge Plan (OT)    Anticipated Discharge Disposition (OT) home with assist;home with outpatient therapy services  -RB       Row Name 08/08/24 1311          Positioning and Restraints    Pre-Treatment Position in bed  -RB     Post  Treatment Position chair  -RB     In Chair with nsg  -RB               User Key  (r) = Recorded By, (t) = Taken By, (c) = Cosigned By      Initials Name Provider Type    Veronica Hamilton OT Occupational Therapist                   Outcome Measures       Row Name 08/08/24 1312          How much help from another is currently needed...    Putting on and taking off regular lower body clothing? 3  -RB     Bathing (including washing, rinsing, and drying) 3  -RB     Toileting (which includes using toilet bed pan or urinal) 3  -RB     Putting on and taking off regular upper body clothing 3  -RB     Taking care of personal grooming (such as brushing teeth) 3  -RB     Eating meals 3  -RB     AM-PAC 6 Clicks Score (OT) 18  -RB       Row Name 08/08/24 1312          Modified Grayland Scale    Modified Grayland Scale 3 - Moderate disability.  Requiring some help, but able to walk without assistance.  -RB       Row Name 08/08/24 1312          Functional Assessment    Outcome Measure Options AM-PAC 6 Clicks Daily Activity (OT);Modified [place]land  -RB               User Key  (r) = Recorded By, (t) = Taken By, (c) = Cosigned By      Initials Name Provider Type    Veronica Hamilton OT Occupational Therapist                  Occupational Therapy Education       Title: PT OT SLP Therapies (Done)       Topic: Occupational Therapy (Done)       Point: ADL training (Done)       Description:   Instruct learner(s) on proper safety adaptation and remediation techniques during self care or transfers.   Instruct in proper use of assistive devices.                  Learning Progress Summary             Patient Acceptance, E,TB,D, VU,DU by RB at 8/8/2024 1312    Comment: HEP, sling mangement, precautions, ADL                         Point: Home exercise program (Done)       Description:   Instruct learner(s) on appropriate technique for monitoring, assisting and/or progressing therapeutic exercises/activities.                  Learning Progress  Summary             Patient Acceptance, E,TB,D, VU,DU by RB at 8/8/2024 1312    Comment: HEP, sling mangement, precautions, ADL                         Point: Precautions (Done)       Description:   Instruct learner(s) on prescribed precautions during self-care and functional transfers.                  Learning Progress Summary             Patient Acceptance, E,TB,D, VU,DU by RB at 8/8/2024 1312    Comment: HEP, sling mangement, precautions, ADL                         Point: Body mechanics (Done)       Description:   Instruct learner(s) on proper positioning and spine alignment during self-care, functional mobility activities and/or exercises.                  Learning Progress Summary             Patient Acceptance, E,TB,D, VU,DU by RB at 8/8/2024 1312    Comment: HEP, sling mangement, precautions, ADL                                         User Key       Initials Effective Dates Name Provider Type Discipline     06/16/21 -  Veronica Webb, ANNALISE Occupational Therapist OT                  OT Recommendation and Plan     Plan of Care Review  Plan of Care Reviewed With: patient, spouse  Progress: no change  Outcome Evaluation: The pt is POD 0 from a L reverse total shoulder arthroplasty. The pt participated in total body dressing, mobility, sling management and was able to verbalize understanding of her precautions/HEP. OP PT planned at d/c.  Plan of Care Reviewed With: patient, spouse  Outcome Evaluation: The pt is POD 0 from a L reverse total shoulder arthroplasty. The pt participated in total body dressing, mobility, sling management and was able to verbalize understanding of her precautions/HEP. OP PT planned at d/c.     Time Calculation:         Time Calculation- OT       Row Name 08/08/24 1302             Time Calculation- OT    OT Start Time 1110  -RB      OT Stop Time 1138  -RB      OT Time Calculation (min) 28 min  -RB      Total Timed Code Minutes- OT 23 minute(s)  -RB      OT Received On 08/08/24  -RB          Timed Charges    67992 - OT Therapeutic Activity Minutes 10  -RB      84458 - OT Self Care/Mgmt Minutes 13  -RB         Untimed Charges    OT Eval/Re-eval Minutes 5  -RB         Total Minutes    Timed Charges Total Minutes 23  -RB      Untimed Charges Total Minutes 5  -RB       Total Minutes 28  -RB                User Key  (r) = Recorded By, (t) = Taken By, (c) = Cosigned By      Initials Name Provider Type    RB Veronica Webb OT Occupational Therapist                  Therapy Charges for Today       Code Description Service Date Service Provider Modifiers Qty    46112324953 HC OT THERAPEUTIC ACT EA 15 MIN 8/8/2024 Veronica Webb OT GO 1    49327056668 HC OT SELF CARE/MGMT/TRAIN EA 15 MIN 8/8/2024 Veronica Webb OT GO 1    22467574508 HC OT EVAL LOW COMPLEXITY 2 8/8/2024 Veronica Webb OT GO 1               OT Discharge Summary  Anticipated Discharge Disposition (OT): home with assist, home with outpatient therapy services    Veronica Webb OT  8/8/2024

## 2024-08-08 NOTE — ANESTHESIA PROCEDURE NOTES
Peripheral Block      Patient reassessed immediately prior to procedure    Patient location during procedure: pre-op  Start time: 8/8/2024 7:40 AM  Reason for block: at surgeon's request and post-op pain management  Performed by  Anesthesiologist: Bahman Vanegas MD  Preanesthetic Checklist  Completed: patient identified, IV checked, site marked, risks and benefits discussed, surgical consent, monitors and equipment checked, pre-op evaluation and timeout performed  Prep:  Pt Position: supine  Sterile barriers:cap, gloves and mask  Prep: ChloraPrep  Patient monitoring: blood pressure monitoring, continuous pulse oximetry and EKG  Procedure    Sedation: yes  Performed under: local infiltration  Guidance:ultrasound guided    ULTRASOUND INTERPRETATION.  Using ultrasound guidance a 21 G gauge needle was placed in close proximity to the brachial plexus nerve, at which point, under ultrasound guidance anesthetic was injected in the area of the nerve and spread of the anesthesia was seen on ultrasound in close proximity thereto.  There were no abnormalities seen on ultrasound; a digital image was taken; and the patient tolerated the procedure with no complications. Images:still images obtained, printed/placed on chart    Laterality:left  Block Type:interscalene  Injection Technique:single-shot  Needle Type:echogenic and Tuohy  Needle Gauge:21 G  Resistance on Injection: none    Medications Used: bupivacaine liposome (EXPAREL) 1.3 % injection - Infiltration   10 mL - 8/8/2024 7:42:00 AM  bupivacaine (PF) (MARCAINE) 0.5 % injection - Injection   10 mL - 8/8/2024 7:42:00 AM      Post Assessment  Injection Assessment: negative aspiration for heme, no paresthesia on injection and incremental injection  Patient Tolerance:comfortable throughout block  Complications:no  Performed by: Bahman Vanegas MD

## 2024-08-08 NOTE — ANESTHESIA PROCEDURE NOTES
Airway  Urgency: elective    Date/Time: 8/8/2024 8:25 AM  Airway not difficult    General Information and Staff    Patient location during procedure: OR  CRNA/CAA: Raya Paris CRNA    Indications and Patient Condition  Indications for airway management: airway protection    Preoxygenated: yes  Mask difficulty assessment: 1 - vent by mask    Final Airway Details  Final airway type: endotracheal airway      Successful airway: ETT  Cuffed: yes   Successful intubation technique: direct laryngoscopy  Endotracheal tube insertion site: oral  Blade: Marycruz  Blade size: 3  ETT size (mm): 7.0  Cormack-Lehane Classification: grade I - full view of glottis  Placement verified by: chest auscultation and capnometry   Measured from: lips  ETT/EBT  to lips (cm): 22  Number of attempts at approach: 1  Assessment: lips, teeth, and gum same as pre-op and atraumatic intubation    Additional Comments   ett cuff up at MOP

## 2024-08-08 NOTE — ANESTHESIA PREPROCEDURE EVALUATION
Anesthesia Evaluation     Patient summary reviewed and Nursing notes reviewed   NPO Solid Status: > 8 hours  NPO Liquid Status: > 2 hours           Airway   Mallampati: II  TM distance: >3 FB  Neck ROM: full  Dental    (+) upper dentures and lower dentures    Pulmonary    (+) pulmonary embolism (H/o PE, resolved),  Cardiovascular     ECG reviewed    (+) hypertension, hyperlipidemia  (-) valvular problems/murmurs, past MI, CAD, dysrhythmias, angina, CHF, cardiac stents, CABG    ROS comment: TTE 10/2023  ·  Left ventricular systolic function is normal. Calculated left ventricular EF = 58.2%  ·  Left ventricular diastolic function was normal.  ·  Estimated right ventricular systolic pressure from tricuspid regurgitation is mildly elevated (35-45 mmHg).      Neuro/Psych  (+) psychiatric history Anxiety  GI/Hepatic/Renal/Endo    (+) thyroid problem hypothyroidism    Musculoskeletal     Abdominal    Substance History - negative use     OB/GYN negative ob/gyn ROS         Other   arthritis,     ROS/Med Hx Other: Fibromyalgia                Anesthesia Plan    ASA 3     general with block     (Interscalene block for POPC)  intravenous induction     Anesthetic plan, risks, benefits, and alternatives have been provided, discussed and informed consent has been obtained with: patient.      CODE STATUS:

## 2024-08-12 ENCOUNTER — TELEPHONE (OUTPATIENT)
Dept: ORTHOPEDIC SURGERY | Facility: HOSPITAL | Age: 84
End: 2024-08-12
Payer: MEDICARE

## 2024-08-12 NOTE — TELEPHONE ENCOUNTER
Post op day 4  Discharge Instructions:  Ask patient about his or her discharge instructions  ?  Patient confirmed understanding   []  Further instruction needed   What, if any, recommendations, teaching, or interventions did you provide? Click or tap here to enter text.  Health status:  Pain controlled Yes   Pain is tolerable with the pain medication.   Recommended interventions:  Yes  incision/dressing status   ?  Clean without redness, drainage, odor  []  Redness    []  Drainage - color Click or tap here to enter text.  []  Odor  NICK - Green light blinking Choose an item.  Difficulties urination No  Last BM 8/8/2024 (if no BM by day 3-recommend OTC suppository or fleets enema)  No BM at this time. Taking medication. Options discussed at this time.   Medications:  ?Medications reviewed with patient/family/caregiver  Patient taking medications as prescribed?   Yes  If not taking medications as prescribed, note specific medicine(s) and reason for each:  Click or tap here to enter text.  Hospital Follow Up Plan:  Follow up Appointment with Orthopedic surgeon:  ?Has f/u appointment                []Scheduled f/u appointment  Home Care ordered at discharge?    No        Home Care started, or contact made?    No   If no, action taken: Total Shoulder  DME obtained/used in home?         Yes   Using IS  Yes   Other information: Ms. Adkins said she is doing good. She remains in the sling. She does have a little pain and tingling in her arm but feels the block has worn off. Told her she could start her exercises if she has control and sensation to her arm. She is icing. Dressing looks good. She does have a dry cough. She is using the IS every 2 hours. She hasn't had a BM yet, we discussed options. She voiced understanding. Otherwise, things are going well. Ms. Adkins doesn't have any questions for me at this time. She has my contact information should she need anything.

## 2024-08-13 ENCOUNTER — OFFICE VISIT (OUTPATIENT)
Dept: ORTHOPEDIC SURGERY | Facility: CLINIC | Age: 84
End: 2024-08-13
Payer: MEDICARE

## 2024-08-13 VITALS
OXYGEN SATURATION: 91 % | SYSTOLIC BLOOD PRESSURE: 135 MMHG | WEIGHT: 201.06 LBS | HEIGHT: 62 IN | HEART RATE: 81 BPM | BODY MASS INDEX: 37 KG/M2 | DIASTOLIC BLOOD PRESSURE: 83 MMHG

## 2024-08-13 DIAGNOSIS — M25.561 CHRONIC PAIN OF BOTH KNEES: ICD-10-CM

## 2024-08-13 DIAGNOSIS — M25.562 CHRONIC PAIN OF BOTH KNEES: ICD-10-CM

## 2024-08-13 DIAGNOSIS — M17.12 PRIMARY OSTEOARTHRITIS OF LEFT KNEE: ICD-10-CM

## 2024-08-13 DIAGNOSIS — M17.11 PRIMARY OSTEOARTHRITIS OF RIGHT KNEE: Primary | ICD-10-CM

## 2024-08-13 DIAGNOSIS — G89.29 CHRONIC PAIN OF BOTH KNEES: ICD-10-CM

## 2024-08-13 PROCEDURE — 1160F RVW MEDS BY RX/DR IN RCRD: CPT

## 2024-08-13 PROCEDURE — 1159F MED LIST DOCD IN RCRD: CPT

## 2024-08-13 PROCEDURE — 99024 POSTOP FOLLOW-UP VISIT: CPT

## 2024-08-13 NOTE — PROGRESS NOTES
"Chief Complaint  Pain and Follow-up of the Left Knee and Pain and Follow-up of the Right Knee    Subjective      Chula Adkins presents to [facility] for follow up of her bilateral knees.  Patient has bilateral knee pain osteoarthritis that she has been managed conservatively.  Patient was last seen in office on 7/2/2024 and received bilateral knee Monovisc injections.  Patient returns today stating she just recently had left total shoulder reverse arthroplasty performed less than a week ago.  She states that her left knee is not feeling the greatest.  She states that her knees did respond fairly well to the previous bilateral Monovisc injections but they are still uncomfortable.  She is wishing to have bilateral knee steroid injections at this time if possible    No Known Allergies    Objective     Vital Signs:   Vitals:    08/13/24 1031   BP: 135/83   Pulse: 81   SpO2: 91%   Weight: 91.2 kg (201 lb 1 oz)   Height: 157.5 cm (62\")     Body mass index is 36.77 kg/m².    I reviewed the patient's chief complaint, history of present illness, review of systems, past medical history, surgical history, family history, social history, medications, and allergy list.     REVIEW OF SYSTEMS    Constitutional: Denies fevers, chills, weight loss  Cardiovascular: Denies chest pain, shortness of breath  Skin: Denies rashes, acute skin changes  Neurologic: Denies headache, loss of consciousness  MSK: Bilateral knee pain.     Ortho Exam  Knee   General: Alert, no acute distress.   Bilateral knee: No pain with passive hip ROM.   Knee stable to varus/valgus stress.  Knee extensor mechanism intact.  0 degrees knee extension. Flexion to 115 degrees.  Crepitus with motion.  Calf soft, non-tender.  Sensation and neurovascularly intact.  Demonstrates active ankle dorsiflexion and plantarflexion.  Palpable pedal pulses.               Imaging Results (Most Recent)       None                Assessment and " Plan   Diagnoses and all orders for this visit:    1. Primary osteoarthritis of right knee (Primary)    2. Primary osteoarthritis of left knee    3. Chronic pain of both knees         Chula Adkins presents today to [facility] for the follow up of their bilateral knees. They have bilateral knee pain and osteoarthritis that we have been treating conservatively with intermittent injections.  Patient is eligible for bilateral knee steroid injections however patient is less than 1 week postoperatively from a left total shoulder replacement.  Discussed that it is contraindicated to receive bilateral knee steroid injection at this time.  Patient has a follow-up appointment with her surgeon in approximately a week and a half and was advised to discuss the time to wait between steroid injections and joint replacement surgery.  Patient verbalized understanding.    Patient will follow-up as needed          Follow Up   Return if symptoms worsen or fail to improve.  There are no Patient Instructions on file for this visit.  Patient was given instructions and counseling regarding her condition or for health maintenance advice. Please see specific information pulled into the AVS if appropriate.       Dictated Utilizing Dragon Dictation. Please note that portions of this note were completed with a voice recognition program. Part of this note may be an electronic transcription/translation of spoken language to printed text using the Dragon Dictation System.

## 2024-09-05 ENCOUNTER — TELEPHONE (OUTPATIENT)
Dept: ORTHOPEDIC SURGERY | Facility: HOSPITAL | Age: 84
End: 2024-09-05
Payer: MEDICARE

## 2024-09-05 NOTE — TELEPHONE ENCOUNTER
Called and spoke with Ms. Adkins to see how she has been doing since her LTSRA 8/8. She said she is doing well. She just finished a therapy session and they said she is on track. She was out shopping at the time of this call. Ms. Adkins doesn't have any questions for me at this time. She has my contact information should she need anything.

## 2024-09-10 ENCOUNTER — TELEPHONE (OUTPATIENT)
Dept: ORTHOPEDIC SURGERY | Facility: CLINIC | Age: 84
End: 2024-09-10
Payer: MEDICARE

## 2024-09-10 NOTE — TELEPHONE ENCOUNTER
Caller: Chula Adkins    Relationship to patient: Self    Best call back number: [phone]    Chief complaint: knee     Type of visit: injection     Requested date: asap      Additional notes:patient is now 6 weeks out from surgery

## 2024-10-28 ENCOUNTER — OFFICE (OUTPATIENT)
Dept: URBAN - METROPOLITAN AREA CLINIC 76 | Facility: CLINIC | Age: 84
End: 2024-10-28
Payer: COMMERCIAL

## 2024-10-28 ENCOUNTER — OFFICE (OUTPATIENT)
Age: 84
End: 2024-10-28
Payer: COMMERCIAL

## 2024-10-28 VITALS
OXYGEN SATURATION: 97 % | DIASTOLIC BLOOD PRESSURE: 86 MMHG | HEART RATE: 59 BPM | HEIGHT: 64 IN | OXYGEN SATURATION: 97 % | SYSTOLIC BLOOD PRESSURE: 138 MMHG | DIASTOLIC BLOOD PRESSURE: 86 MMHG | HEIGHT: 64 IN | WEIGHT: 201 LBS | WEIGHT: 201 LBS | HEART RATE: 59 BPM | SYSTOLIC BLOOD PRESSURE: 138 MMHG

## 2024-10-28 DIAGNOSIS — M62.9 DISORDER OF MUSCLE, UNSPECIFIED: ICD-10-CM

## 2024-10-28 DIAGNOSIS — K59.00 CONSTIPATION, UNSPECIFIED: ICD-10-CM

## 2024-10-28 DIAGNOSIS — K62.3 RECTAL PROLAPSE: ICD-10-CM

## 2024-10-28 DIAGNOSIS — R19.7 DIARRHEA, UNSPECIFIED: ICD-10-CM

## 2024-10-28 PROCEDURE — 99203 OFFICE O/P NEW LOW 30 MIN: CPT | Performed by: NURSE PRACTITIONER

## (undated) DEVICE — DRAPE,U/ SHT,SPLIT,PLAS,STERIL: Brand: MEDLINE

## (undated) DEVICE — HANDPIECE SET WITH COAXIAL HIGH FLOW TIP AND SUCTION TUBE: Brand: INTERPULSE

## (undated) DEVICE — GLV SURG SENSICARE SLT PF LF 7.5 STRL

## (undated) DEVICE — POOLE SUCTION HANDLE: Brand: CARDINAL HEALTH

## (undated) DEVICE — 2, DISPOSABLE SUCTION/IRRIGATOR WITHOUT DISPOSABLE TIP: Brand: STRYKEFLOW

## (undated) DEVICE — GLV SURG BIOGEL LTX PF 6 1/2

## (undated) DEVICE — ADAPT CATH CONN URETRL

## (undated) DEVICE — Device: Brand: ENDO CARRY-ON PROCEDURE KIT

## (undated) DEVICE — BNDG ELAS CO-FLEX SLF ADHR 4IN5YD LF STRL

## (undated) DEVICE — GLV SURG BIOGEL LTX PF 8

## (undated) DEVICE — MAT FLR ABSORBENT LG 4FT 10 2.5FT

## (undated) DEVICE — GLV SURG BIOGEL LTX PF 8 1/2

## (undated) DEVICE — SUT ETHIB 2 CV V37 MS/4 30IN MX69G

## (undated) DEVICE — PATIENT RETURN ELECTRODE, SINGLE-USE, CONTACT QUALITY MONITORING, ADULT, WITH 9FT CORD, FOR PATIENTS WEIGING OVER 33LBS. (15KG): Brand: MEGADYNE

## (undated) DEVICE — BIT DRL EQUINOXE 2 AND 3.2MM

## (undated) DEVICE — SUT VIC 0 CT1 36IN J946H

## (undated) DEVICE — SYS CLOSE PORTII CARTR/THOMASN XL

## (undated) DEVICE — SUT VIC 2/0 X1 27IN J459H

## (undated) DEVICE — GLV SURG SENSICARE PI LF PF 7.5 GRN STRL

## (undated) DEVICE — SHEET, DRAPE, SPLIT, STERILE: Brand: MEDLINE

## (undated) DEVICE — SUT SILK 2/0 SH 30IN K833H

## (undated) DEVICE — SUT PDS 1 CT1 36IN Z347H

## (undated) DEVICE — PENCL E/S SMOKEEVAC W/TELESCP CANN

## (undated) DEVICE — STERILE POLYISOPRENE POWDER-FREE SURGICAL GLOVES: Brand: PROTEXIS

## (undated) DEVICE — SYR LL TP 10ML STRL

## (undated) DEVICE — SUT MNCRYL PLS ANTIB UD 4/0 PS2 18IN

## (undated) DEVICE — DUAL CUT SAGITTAL BLADE

## (undated) DEVICE — TOTAL TRAY, 16FR 10ML SIL FOLEY, URN: Brand: MEDLINE

## (undated) DEVICE — DRESSING,GAUZE,XEROFORM,CURAD,1"X8",ST: Brand: CURAD

## (undated) DEVICE — GLV SURG BIOGEL LTX PF 7 1/2

## (undated) DEVICE — SOL IRR H2O BTL 250ML STRL

## (undated) DEVICE — TIP COVER ACCESSORY

## (undated) DEVICE — REDUCER: Brand: ENDOWRIST

## (undated) DEVICE — PREP SOL POVIDONE/IODINE BT 4OZ

## (undated) DEVICE — SUT VIC 0 UR6 27IN VCP603H

## (undated) DEVICE — 3M™ IOBAN™ 2 ANTIMICROBIAL INCISE DRAPE 6650EZ: Brand: IOBAN™ 2

## (undated) DEVICE — SKIN PREP TRAY W/CHG: Brand: MEDLINE INDUSTRIES, INC.

## (undated) DEVICE — LAPAROVUE VISIBILITY SYSTEM LAPAROSCOPIC SOLUTIONS: Brand: LAPAROVUE

## (undated) DEVICE — LAPAROSCOPIC SMOKE EVACUATION SYSTEM ACTIVE AND PASSIVE: Brand: VALLEYLAB

## (undated) DEVICE — CANNULA SEAL

## (undated) DEVICE — ELECTRD BLD EDGE COAT 3IN

## (undated) DEVICE — VESSEL SEALER EXTEND: Brand: ENDOWRIST

## (undated) DEVICE — ARM DRAPE

## (undated) DEVICE — 40583 XL ADVANCED TRENDELENBURG POSITIONING KIT: Brand: 40583 XL ADVANCED TRENDELENBURG POSITIONING KIT

## (undated) DEVICE — PK SHLDR OPN 40

## (undated) DEVICE — LAPAROSCOPIC SCISSORS: Brand: EPIX LAPAROSCOPIC SCISSORS

## (undated) DEVICE — LIGHT SLEEVE: Brand: DEVON

## (undated) DEVICE — KT SHLDR EXACTECHGPS DISP

## (undated) DEVICE — INTENDED FOR TISSUE SEPARATION, AND OTHER PROCEDURES THAT REQUIRE A SHARP SURGICAL BLADE TO PUNCTURE OR CUT.: Brand: BARD-PARKER ® CARBON RIB-BACK BLADES

## (undated) DEVICE — ANTIBACTERIAL UNDYED BRAIDED (POLYGLACTIN 910), SYNTHETIC ABSORBABLE SUTURE: Brand: COATED VICRYL

## (undated) DEVICE — ACCESS PLATFORM FOR MINIMALLY INVASIVE SURGERY.: Brand: GELPORT® LAPAROSCOPIC  SYSTEM

## (undated) DEVICE — BLADELESS OBTURATOR: Brand: WECK VISTA

## (undated) DEVICE — DRSNG WND GZ CURAD OIL EMULSION 3X3IN STRL

## (undated) DEVICE — MEDI-VAC NON-CONDUCTIVE SUCTION TUBING: Brand: CARDINAL HEALTH

## (undated) DEVICE — ZIP 16 SURGICAL SKIN CLOSURE DEVICE, PSA: Brand: ZIP 16 SURGICAL SKIN CLOSURE DEVICE

## (undated) DEVICE — KT BIT DRL EXATECHGPS REV 2MM 3.2MM DISP

## (undated) DEVICE — BLANKT WARM LOWR/BDY 100X120CM

## (undated) DEVICE — HYBRID CO2 TUBING/CAP SET FOR OLYMPUS® SCOPES & CO2 SOURCE: Brand: ERBE

## (undated) DEVICE — ARM SLING: Brand: DEROYAL

## (undated) DEVICE — SLV SCD KN/LEN ADJ EXPRSS BLENDED MD 1P/U

## (undated) DEVICE — SEAL

## (undated) DEVICE — KT PIN HEX SHLDR CORACOID EXACTECHGPS DISP

## (undated) DEVICE — DAVINCI-LF: Brand: MEDLINE INDUSTRIES, INC.

## (undated) DEVICE — PENCL SMOKE/EVAC MEGADYNE TELESCP 10FT

## (undated) DEVICE — SYR LUERLOK 30CC

## (undated) DEVICE — ENDOPATH XCEL WITH OPTIVIEW TECHNOLOGY BLADELESS TROCARS WITH STABILITY SLEEVES: Brand: ENDOPATH XCEL OPTIVIEW

## (undated) DEVICE — PAD GRND REM POLYHESIVE A/ DISP